# Patient Record
Sex: FEMALE | Race: ASIAN | NOT HISPANIC OR LATINO | Employment: FULL TIME | URBAN - METROPOLITAN AREA
[De-identification: names, ages, dates, MRNs, and addresses within clinical notes are randomized per-mention and may not be internally consistent; named-entity substitution may affect disease eponyms.]

---

## 2017-01-20 ENCOUNTER — OFFICE VISIT (OUTPATIENT)
Dept: FAMILY MEDICINE | Facility: CLINIC | Age: 27
End: 2017-01-20

## 2017-01-20 VITALS
HEART RATE: 68 BPM | TEMPERATURE: 98 F | WEIGHT: 125 LBS | OXYGEN SATURATION: 98 % | SYSTOLIC BLOOD PRESSURE: 101 MMHG | BODY MASS INDEX: 25.2 KG/M2 | RESPIRATION RATE: 16 BRPM | HEIGHT: 59 IN | DIASTOLIC BLOOD PRESSURE: 67 MMHG

## 2017-01-20 DIAGNOSIS — H72.92 RUPTURED TYMPANIC MEMBRANE, LEFT: ICD-10-CM

## 2017-01-20 DIAGNOSIS — K05.10 GINGIVITIS: ICD-10-CM

## 2017-01-20 DIAGNOSIS — R07.0 THROAT PAIN: Primary | ICD-10-CM

## 2017-01-20 DIAGNOSIS — Z23 IMMUNIZATION DUE: ICD-10-CM

## 2017-01-20 LAB — S PYO AG THROAT QL IA.RAPID: NEGATIVE

## 2017-01-20 NOTE — PROGRESS NOTES
"       HPI:     Sydney Dao is a 26 year old  female with no significant PMH who presents with sore throat pain.     For the last 3 weeks she has had pain in her lower gums and in her throat. No fever, +runny nose.     Is able to eat and drink normally. No nausea or vomiting. No dysphagia or odynophagia. No problems with chewing.     Has taken tylenol and that helps a little bit.     No history of problems with teeth. Does not have a dentist. Has not been to a dentist in years.     A [employer]  was used for this visit           PMHX:     Patient Active Problem List   Diagnosis     IUD contraception: Mirena       Current Outpatient Prescriptions   Medication Sig Dispense Refill     levonorgestrel (MIRENA) 20 MCG/24HR IUD 1 each by Intrauterine route once       acetaminophen (TYLENOL) 325 MG tablet Take 2 tablets (650 mg) by mouth every 6 hours as needed for mild pain 100 tablet 0     ibuprofen (ADVIL,MOTRIN) 600 MG tablet Take 1 tablet (600 mg) by mouth every 6 hours as needed for moderate pain 30 tablet 1       Social History   Substance Use Topics     Smoking status: Never Smoker      Smokeless tobacco: Never Used     Alcohol Use: No       Social History     Social History Narrative       Allergies   Allergen Reactions     No Known Drug Allergy        No results found for this or any previous visit (from the past 24 hour(s)).         Review of Systems:   See HPI.          Physical Exam:     Filed Vitals:    01/20/17 0852   BP: 101/67   Pulse: 68   Temp: 98  F (36.7  C)   TempSrc: Oral   Resp: 16   Height: 4' 11\" (149.9 cm)   Weight: 125 lb (56.7 kg)   SpO2: 98%     Body mass index is 25.23 kg/(m^2).    General: Alert, well-appearing female in NAD  HEENT: PERRL, no conjunctival injection. moist oral mucus membranes, teeth in poor dentition with many tartar plaques present. Patient has pain with movement of right buccal mucosa away from right lower gums. She has pain at the gums surrounding teeth 31 and 32. " There are no ulcerations in the oral mucosa. There is mild pharyngeal erythema, no exudate. External auditory canals are normal bilaterally. Left TM normal. Right TM shows 30% central perforation.   Pulm: CTA BL, no tachypnea  CV: RRR, no murmur  Abd: soft, NTND, no masses  Ext: Warm, well perfused, 2+ BL radial pulses, no LE edema  Skin: No rash on limited skin exam      Assessment and Plan     1. Throat pain  Patient did have mild pharyngeal erythema but rapid strep negative.Will call her if culture comes back positive.   - Rapid Strep Screen (Group) ([facility])  - [facility] Throat ([facility])    2. Gingivitis  Patient has pain along the gums and at the right buccal mucosa. No sign of edema of the cheek. Will start by treating for gingivitis but discussed that it is very important for patient to see a dentist to debride teeth.   - amoxicillin-clavulanate (AUGMENTIN) 875-125 MG per tablet; Take 1 tablet by mouth 2 times daily  Dispense: 20 tablet; Refill: 0  - DENTAL REFERRAL; Future    3. Ruptured tympanic membrane, left  Discussed with patient the risk of hearing loss with ruptured TM. She has low concern about her ear. She is concerned about cost. I did advise her that this issue can wait until after she is done dealing with her oral concerns.   - OTOLARYNGOLOGY REFERRAL; Future    4. Immunization due  - ADMIN VACCINE, INITIAL  - Flu vaccine, quad, with preservative, 0.5 ml    Options for treatment and follow-up care were reviewed with the patient and/or guardian. Sydney Dao and/or guardian engaged in the decision making process and verbalized understanding of the options discussed and agreed with the final plan.    Claudia Sheppard MD  [facility] Resident  Pager# [phone]    Precepted with:Madison Foster MD

## 2017-01-20 NOTE — MR AVS SNAPSHOT
After Visit Summary   1/20/2017    Sydney Dao    MRN: [id number]           Patient Information     Date Of Birth          1990        Visit Information        Provider Department      1/20/2017 8:40 AM Claudia Sheppard MD [facility]        Today's Diagnoses     Throat pain    -  1     Gingivitis         Ruptured tympanic membrane, left            Follow-ups after your visit        Additional Services     DENTAL REFERRAL       Patient is in room number: 16    Reason for Referral: Suspect gingivitis       needed: Yes  Language: Hmong    May leave message on voicemail: Yes    ([facility] Only) Referral should be tracked (Yes/No)?            OTOLARYNGOLOGY REFERRAL       Patient is in room number: 16    Reason for Referral: Perforated left tympanic membrane     needed: Yes  Language: Hmong    May leave message on voicemail: Yes    ([facility] Only) Referral should be tracked (Yes/No)?                  Future tests that were ordered for you today     Open Future Orders        Priority Expected Expires Ordered    OTOLARYNGOLOGY REFERRAL Routine  1/20/2018 1/20/2017    DENTAL REFERRAL Routine  1/20/2018 1/20/2017            Who to contact     Please call your clinic at [phone] to:    Ask questions about your health    Make or cancel appointments    Discuss your medicines    Learn about your test results    Speak to your doctor   If you have compliments or concerns about an experience at your clinic, or if you wish to file a complaint, please contact [facility] Patient Relations at [phone] or email us at [email or web address]         Additional Information About Your Visit        MyChart Information     Winkcam is an electronic gateway that provides easy, online access to your medical records. With Winkcam, you can request a clinic appointment, read your test results, renew a prescription or communicate with your care team.     To  "sign up for MyChart visit the website at [email or web address]   You will be asked to enter the access code listed below, as well as some personal information. Please follow the directions to create your username and password.     Your access code is: [email or web address]  Expires: 2017  8:39 AM     Your access code will  in 90 days. If you need help or a new code, please contact your [facility] or call [phone] for assistance.        Care EveryWhere ID     This is your Care EveryWhere ID. This could be used by other organizations to access your [place] medical records  [id number]        Your Vitals Were     Pulse Temperature Respirations Height BMI (Body Mass Index) Pulse Oximetry    68 98  F (36.7  C) (Oral) 16 4' 11\" (149.9 cm) 25.23 kg/m2 98%       Blood Pressure from Last 3 Encounters:   17 101/67   16 111/66   11/09/15 100/68    Weight from Last 3 Encounters:   17 125 lb (56.7 kg)   16 121 lb 12.8 oz (55.248 kg)   11/09/15 117 lb (53.071 kg)              We Performed the Following     Rapid Strep Screen (Group) ([facility])          Today's Medication Changes          These changes are accurate as of: 17  9:29 AM.  If you have any questions, ask your nurse or doctor.               Start taking these medicines.        Dose/Directions    amoxicillin-clavulanate 875-125 MG per tablet   Commonly known as:  AUGMENTIN   Used for:  Gingivitis   Started by:  Claudia Sheppard MD        Dose:  1 tablet   Take 1 tablet by mouth 2 times daily   Quantity:  20 tablet   Refills:  0            Where to get your medicines      These medications were sent to [facility]/pharmacy #[id number] - [place], [place] - [address]  [address], [place] [address]    Hours:  24-hours Phone:  [phone]    - amoxicillin-clavulanate 875-125 MG per tablet             Primary Care Provider Office Phone # Fax #    Mya Burton [phone]    "    [facility] [address] [place] [address]        Thank you!     Thank you for choosing [facility]  for your care. Our goal is always to provide you with excellent care. Hearing back from our patients is one way we can continue to improve our services. Please take a few minutes to complete the written survey that you may receive in the mail after your visit with us. Thank you!             Your Updated Medication List - Protect others around you: Learn how to safely use, store and throw away your medicines at www.disposemymeds.org.          This list is accurate as of: 1/20/17  9:29 AM.  Always use your most recent med list.                   Brand Name Dispense Instructions for use    acetaminophen 325 MG tablet    TYLENOL    100 tablet    Take 2 tablets (650 mg) by mouth every 6 hours as needed for mild pain       amoxicillin-clavulanate 875-125 MG per tablet    AUGMENTIN    20 tablet    Take 1 tablet by mouth 2 times daily       ibuprofen 600 MG tablet    ADVIL/MOTRIN    30 tablet    Take 1 tablet (600 mg) by mouth every 6 hours as needed for moderate pain       levonorgestrel 20 MCG/24HR IUD    MIRENA     1 each by Intrauterine route once

## 2017-01-20 NOTE — NURSING NOTE
1/19/2017 PCS Previsit Plan   DUE FOR:  Pap-offered  Flu shot-offered  MyChart? pending  No reminder call since just schedule appt this am  CYRIL Washington    January 20, 2017 MD Previsit Plan  Agree with above. Thank you!  --Claudia Sheppard     name: Yesi Yuan  Language: [facility]  Agency: [employer]  Phone number: [phone]    Sydney Padilla Feliberto      1.  Has the patient received the information for the influenza vaccine? YES    2.  Does the patient have any of the following contraindications? No     Allergy to eggs? No     Allergic reaction to previous influenza vaccines? No     Any other problems to previous influenza vaccines? No     Paralyzed by Guillain-[place] syndrome? No     Currently pregnant? No      Current moderate or severe illness? No    Vaccination given by CYRIL Washington.  Recorded by Jared Yuan

## 2017-01-20 NOTE — PATIENT INSTRUCTIONS
Patient has a referral for both an ENT and dental.  She wanted to make her own appointments and card was given for [facility] and [facility].       Kalina

## 2017-01-22 LAB — CULTURE: NORMAL

## 2017-01-23 NOTE — PROGRESS NOTES
Quick Note:    Please call:    Jonn Grimes,  Your throat culture results came back as normal, there is no strep bacteria there so no need for antibiotics.    Thank you,  Dr Burton  ______

## 2017-01-27 NOTE — PROGRESS NOTES
Preceptor Attestation:  Patient's case reviewed and discussed with Claudia Sheppard MD Patient seen and discussed with the resident.. I agree with assessment and plan of care.  Supervising Physician:  Madison Foster MD  [facility]

## 2017-06-26 ENCOUNTER — OFFICE VISIT (OUTPATIENT)
Dept: FAMILY MEDICINE | Facility: CLINIC | Age: 27
End: 2017-06-26

## 2017-06-26 VITALS
BODY MASS INDEX: 24.9 KG/M2 | DIASTOLIC BLOOD PRESSURE: 66 MMHG | HEIGHT: 60 IN | HEART RATE: 68 BPM | SYSTOLIC BLOOD PRESSURE: 101 MMHG | OXYGEN SATURATION: 100 % | TEMPERATURE: 97.6 F | WEIGHT: 126.8 LBS

## 2017-06-26 DIAGNOSIS — Z30.431 IUD CHECK UP: Primary | ICD-10-CM

## 2017-06-26 DIAGNOSIS — Z97.5 IUD CONTRACEPTION: ICD-10-CM

## 2017-06-26 DIAGNOSIS — Z12.4 SCREENING FOR CERVICAL CANCER: ICD-10-CM

## 2017-06-26 DIAGNOSIS — Z00.00 HEALTHCARE MAINTENANCE: ICD-10-CM

## 2017-06-26 NOTE — PROGRESS NOTES
Preceptor Attestation:  Patient's case reviewed and discussed with Aracelis العراقي, DO Patient seen and discussed with the resident.. I agree with assessment and plan of care.  Supervising Physician:  Thor Whitten MD  [facility]

## 2017-06-26 NOTE — MR AVS SNAPSHOT
After Visit Summary   2017    Sydney Dao    MRN: [id number]           Patient Information     Date Of Birth          1990        Visit Information        Provider Department      2017 9:20 AM Aracelis العراقي, DO [facility]        Today's Diagnoses     IUD check up    -  1    IUD contraception: Mirena        Healthcare maintenance        Screening for cervical cancer           Follow-ups after your visit        Who to contact     Please call your clinic at [phone] to:    Ask questions about your health    Make or cancel appointments    Discuss your medicines    Learn about your test results    Speak to your doctor   If you have compliments or concerns about an experience at your clinic, or if you wish to file a complaint, please contact [facility] Patient Relations at [phone] or email us at [email or web address]         Additional Information About Your Visit        MyChart Information     Adonit is an electronic gateway that provides easy, online access to your medical records. With Adonit, you can request a clinic appointment, read your test results, renew a prescription or communicate with your care team.     To sign up for GetBulbt visit the website at [email or web address]   You will be asked to enter the access code listed below, as well as some personal information. Please follow the directions to create your username and password.     Your access code is: [email or web address]  Expires: 2017 10:31 AM     Your access code will  in 90 days. If you need help or a new code, please contact your [facility] or call [phone] for assistance.        Care EveryWhere ID     This is your Care EveryWhere ID. This could be used by other organizations to access your [place] medical records  [id number]        Your Vitals Were     Pulse Temperature Height Pulse Oximetry BMI (Body Mass Index)        68 97.6  F (36.4  C) (Oral) 5' (152.4 cm) 100% 24.76 kg/m2        Blood Pressure from Last 3 Encounters:   06/26/17 101/66   01/20/17 101/67   02/17/16 111/66    Weight from Last 3 Encounters:   06/26/17 126 lb 12.8 oz (57.5 kg)   01/20/17 125 lb (56.7 kg)   02/17/16 121 lb 12.8 oz (55.2 kg)              We Performed the Following     GYN Cytology ([facility])        Primary Care Provider Office Phone # Fax #    Mya Britt Burton [phone]       [facility] [address] [place] [address]        Equal Access to Services     ABIMAEL AMBRIZ : Hadii dorene hernadez hadasho Soomaali, waaxda luqadaha, qaybta kaalmada adeegyada, waxay idiin paulinan bruno العراقي . So [facility] [phone].    ATENCIÓN: Si habla español, tiene a mcclelland disposición servicios gratuitos de asistencia lingüística. Llame al [phone].    We comply with applicable federal civil rights laws and [place] laws. We do not discriminate on the basis of race, color, national origin, age, disability sex, sexual orientation or gender identity.            Thank you!     Thank you for choosing [facility]  for your care. Our goal is always to provide you with excellent care. Hearing back from our patients is one way we can continue to improve our services. Please take a few minutes to complete the written survey that you may receive in the mail after your visit with us. Thank you!             Your Updated Medication List - Protect others around you: Learn how to safely use, store and throw away your medicines at www.disposemymeds.org.          This list is accurate as of: 6/26/17 10:31 AM.  Always use your most recent med list.                   Brand Name Dispense Instructions for use Diagnosis    acetaminophen 325 MG tablet    TYLENOL    100 tablet    Take 2 tablets (650 mg) by mouth every 6 hours as needed for mild pain    Back pain       ibuprofen 600 MG tablet    ADVIL/MOTRIN    30 tablet    Take 1 tablet  (600 mg) by mouth every 6 hours as needed for moderate pain    Back pain       levonorgestrel 20 MCG/24HR IUD    MIRENA     1 each by Intrauterine route once

## 2017-06-26 NOTE — NURSING NOTE
name: Alejandra Ortiz  Language: Hmong  Agency: [facility]  Phone number: [phone]

## 2017-06-26 NOTE — PROGRESS NOTES
HPI:       Sydney Dao is a 27 year old  female without a significant past medical history who presents for IUD check.     IUD -   - Mirena placed May 2014, due for removal May 2019.   - Amenorrhea, no periods on MIrena  - No complications or concerns    HEALTH MAINTENANCE -   - Due for pap smear, last pap 2013, no abnormal paps   - Sexually active, monogamous relationship, not concerned about STI's     A [employer]  was used for this visit         PMHX:     Patient Active Problem List   Diagnosis     IUD contraception: Mirena       Current Outpatient Prescriptions   Medication     levonorgestrel (MIRENA) 20 MCG/24HR IUD     ibuprofen (ADVIL,MOTRIN) 600 MG tablet     acetaminophen (TYLENOL) 325 MG tablet      Allergies   Allergen Reactions     No Known Drug Allergy      Social History     Social History     Marital status:      Spouse name: Ariana White     Number of children: 1     Years of education: 8th [place]     Occupational History     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     Social History Main Topics     Smoking status: Never Smoker     Smokeless tobacco: Never Used     Alcohol use No     Drug use: No     Sexual activity: Yes     Partners: Male     Other Topics Concern     None     Social History Narrative            Review of Systems:   CONSTITUTIONAL: normal appetite, stable weight, no fatigue  GYN: no changes in vaginal discharge  URO: no dysuria or frequency           Physical Exam:     /66  Pulse 68  Temp 97.6  F (36.4  C) (Oral)  Ht 5' (152.4 cm)  Wt 126 lb 12.8 oz (57.5 kg)  SpO2 100%  BMI 24.76 kg/m2    GENERAL: pleasant, well groomed, young Hmong female, no acute distress  HEART: regular rate and rhythm, no murmurs  LUNGS: clear to auscultation bilaterally   GYN: no vulvar lesions, physiologic discharge, cervix pink without lesions, IUD strings visible, approximately 3 cm in length   VASCULAR: pulses intact, brisk capillary refill       Assessment and Plan     1. IUD check up  2.  IUD contraception: Mirena  Strings still in place.   Due for removal May 2019, added to problem list overview.   Counseled okay to remove early if desires alternative method or pregnancy.     3. Healthcare maintenance  Declines gonorrhea/chlamydia screening     4. Screening for cervical cancer  Due for pap smear today. < 31 yo, so not cotesting with HPV, but will reflex test if abnormal. Will call with results.   - GYN Cytology ([facility])      Options for treatment and follow-up care were reviewed with the patient and/or guardian. Sydney Valerie Dao and/or guardian engaged in the decision making process and verbalized understanding of the options discussed and agreed with the final plan.    Aracelis العراقي DO      Precepted today with: Thor Whitten MD

## 2017-07-04 ENCOUNTER — RECORDS - HEALTHEAST (OUTPATIENT)
Dept: ADMINISTRATIVE | Facility: OTHER | Age: 27
End: 2017-07-04

## 2017-07-04 LAB
HIGH RISK?: NO
HPV REFLEX?: NORMAL
LAB AP ABNORMAL BLEEDING: NO
LAB AP BIRTH CONTROL/HORMONES: NORMAL
LAB AP CASE REPORT: NORMAL
LAB AP CERVICAL APPEARANCE: NORMAL
LAB AP GYN INTERPRETATION: NORMAL
LAB AP MALIGNANT?: NORMAL
LAB AP PATIENT STATUS: NORMAL
LAB AP PREVIOUS ABNL: NO
LAB AP PREVIOUS NORMAL: NORMAL
LAST MENS PERIOD: NORMAL
SPECIMEN ADEQUACY:: NORMAL

## 2017-10-02 ENCOUNTER — OFFICE VISIT (OUTPATIENT)
Dept: FAMILY MEDICINE | Facility: CLINIC | Age: 27
End: 2017-10-02

## 2017-10-02 VITALS
HEIGHT: 60 IN | BODY MASS INDEX: 23.91 KG/M2 | OXYGEN SATURATION: 100 % | SYSTOLIC BLOOD PRESSURE: 103 MMHG | WEIGHT: 121.8 LBS | TEMPERATURE: 98.4 F | DIASTOLIC BLOOD PRESSURE: 69 MMHG | HEART RATE: 68 BPM

## 2017-10-02 DIAGNOSIS — Z30.432 ENCOUNTER FOR IUD REMOVAL: Primary | ICD-10-CM

## 2017-10-02 DIAGNOSIS — Z31.69 ENCOUNTER FOR PRECONCEPTION CONSULTATION: ICD-10-CM

## 2017-10-02 DIAGNOSIS — Z23 NEEDS FLU SHOT: ICD-10-CM

## 2017-10-02 RX ORDER — PRENATAL VIT/IRON FUM/FOLIC AC 27MG-0.8MG
1 TABLET ORAL DAILY
Qty: 100 TABLET | Refills: 3 | Status: SHIPPED | OUTPATIENT
Start: 2017-10-02 | End: 2018-06-06

## 2017-10-02 NOTE — PATIENT INSTRUCTIONS
- you may have bleeding or cramping in the next week. If it is much heavier than a period, please call the clinic.

## 2017-10-02 NOTE — PROGRESS NOTES
"  Subjective:   Sydney Dao is a 27 year old  female with Mirena IUD who presents for:    IUD removal; placed 5/2014, strings checked 6/2017  -  wants to have another baby  - rare cramping  - no periods  - never gc/chlam  - , same partner  - 2 children, ages 4 and 3  - not on prenatal vitamin    Social/other: , 2 kids  A [employer]  was used for this visit  ROS negative other than mentioned in HPI   History and medications listed below  Objective:   /69  Pulse 68  Temp 98.4  F (36.9  C) (Oral)  Ht 4' 11.5\" (151.1 cm)  Wt 121 lb 12.8 oz (55.2 kg)  SpO2 100%  BMI 24.19 kg/m2  Body mass index is 24.19 kg/(m^2).  Gen: alert, NAD,  HENT: oral mucosa moist  Abd: soft, nontender  MS: extremities grossly normal  Ext: no LE edema  Skin: no rashes on exposed skin  Neuro: mentation intact, speech normal  Psych: mood normal, affect normal    Procedure note:   IUD removal  Patient placed in lithotomy position, cervix visualized with speculum. No abnormal discharge or pain. Cervix normal appearing with no friability or lesions. Both IUD strings were visible about 1 cm from external os. Strings were grasped with ring forceps and IUD easily removed with gentle traction. No bleeding noted. No cramping. Patient instructed to lay flat for several minutes afterwards. No lightheadedness or syncope with position changes. Dr. Swanson present for entire procedure.    Assessment and Plan     1. Encounter for IUD removal  Tolerated removal well. Amenorrheic on IUD. Plans to try to conceive.    2. Encounter for preconception consultation  Counseled on taking PNV and ability to become pregnant prior to menstruation, and that it could take 1 year.  - Prenatal Vit-Fe Fumarate-FA (PRENATAL MULTIVITAMIN PLUS IRON) 27-0.8 MG TABS per tablet; Take 1 tablet by mouth daily  Dispense: 100 tablet; Refill: 3    3. Needs flu shot  - ADMIN VACCINE, INITIAL  - Flu vaccine, quad, preserve-free, 0.5 ml    Follow up: " alexandre Peter MD, MPH  [facility] Medicine Resident    Precepted with: Su Swanson MD    Options for treatment and follow-up care were reviewed with the patient and/or guardian. Sydney Dao and/or guardian engaged in the decision making process and verbalized understanding of the options discussed and agreed with the final plan.  PMHX:     Patient Active Problem List   Diagnosis     IUD contraception: Mirena     Screening for cervical cancer     Current Outpatient Prescriptions   Medication Sig Dispense Refill     Prenatal Vit-Fe Fumarate-FA (PRENATAL MULTIVITAMIN PLUS IRON) 27-0.8 MG TABS per tablet Take 1 tablet by mouth daily 100 tablet 3     levonorgestrel (MIRENA) 20 MCG/24HR IUD 1 each by Intrauterine route once       ibuprofen (ADVIL,MOTRIN) 600 MG tablet Take 1 tablet (600 mg) by mouth every 6 hours as needed for moderate pain 30 tablet 1     acetaminophen (TYLENOL) 325 MG tablet Take 2 tablets (650 mg) by mouth every 6 hours as needed for mild pain 100 tablet 0     Family History   Problem Relation Age of Onset     Family History Negative Other      Social History     Social History Narrative     Allergies   Allergen Reactions     No Known Drug Allergy      No results found for this or any previous visit (from the past 24 hour(s)).

## 2017-10-02 NOTE — NURSING NOTE
Sydney Dao      1.  Has the patient received the information for the influenza vaccine? YES    2.  Does the patient have any of the following contraindications?     Allergy to eggs? No     Allergic reaction to previous influenza vaccines? No     Any other problems to previous influenza vaccines? No     Paralyzed by Guillain-[place] syndrome? No     Currently pregnant? NO     Current moderate or severe illness? No    Vaccination given by TUSHAR MCCORD CMA  .  Recorded by Jenae Mccord

## 2017-10-02 NOTE — MR AVS SNAPSHOT
After Visit Summary   10/2/2017    Sydney Dao    MRN: [id number]           Patient Information     Date Of Birth          1990        Visit Information        Provider Department      10/2/2017 10:20 AM Tamera Peter MD [facility]        Today's Diagnoses     Encounter for IUD removal    -  1    Encounter for preconception consultation          Care Instructions    - you may have bleeding or cramping in the next week. If it is much heavier than a period, please call the clinic.           Follow-ups after your visit        Follow-up notes from your care team     Return if symptoms worsen or fail to improve.      Who to contact     Please call your clinic at [phone] to:    Ask questions about your health    Make or cancel appointments    Discuss your medicines    Learn about your test results    Speak to your doctor   If you have compliments or concerns about an experience at your clinic, or if you wish to file a complaint, please contact [facility] Patient Relations at [phone] or email us at [email or web address]         Additional Information About Your Visit        MyChart Information     [employer] is an electronic gateway that provides easy, online access to your medical records. With [employer], you can request a clinic appointment, read your test results, renew a prescription or communicate with your care team.     To sign up for [employer] visit the website at [email or web address]   You will be asked to enter the access code listed below, as well as some personal information. Please follow the directions to create your username and password.     Your access code is: [email or web address]  Expires: 2017 11:51 AM     Your access code will  in 90 days. If you need help or a new code, please contact your [facility] or call [phone] for assistance.        Care EveryWhere ID     This is your Care  "EveryWhere ID. This could be used by other organizations to access your [facility] medical records  [id number]        Your Vitals Were     Pulse Temperature Height Pulse Oximetry BMI (Body Mass Index)       68 98.4  F (36.9  C) (Oral) 4' 11.5\" (151.1 cm) 100% 24.19 kg/m2        Blood Pressure from Last 3 Encounters:   10/02/17 103/69   06/26/17 101/66   01/20/17 101/67    Weight from Last 3 Encounters:   10/02/17 121 lb 12.8 oz (55.2 kg)   06/26/17 126 lb 12.8 oz (57.5 kg)   01/20/17 125 lb (56.7 kg)              Today, you had the following     No orders found for display         Today's Medication Changes          These changes are accurate as of: 10/2/17 11:51 AM.  If you have any questions, ask your nurse or doctor.               Start taking these medicines.        Dose/Directions    prenatal multivitamin plus iron 27-0.8 MG Tabs per tablet   Used for:  Encounter for preconception consultation   Started by:  Tamera Peter MD        Dose:  1 tablet   Take 1 tablet by mouth daily   Quantity:  100 tablet   Refills:  3            Where to get your medicines      These medications were sent to [facility]/pharmacy #[id number] - [place], [place] - [address] [facility]  [address], [place] [address]    Hours:  24-hours Phone:  [phone]     prenatal multivitamin plus iron 27-0.8 MG Tabs per tablet                Primary Care Provider Office Phone # Fax #    Lashawn Ojeda [phone]       [facility] [address] [place] [address]        Equal Access to Services     ABIMAEL AMBRIZ AH: Hadarchana Brown, waaxda luqadaha, qaybta kaalmasofia tellez. So [facility] [phone].    ATENCIÓN: Si habla español, tiene a mcclelland disposición servicios gratuitos de asistencia lingüística. Charanjit al [phone].    We comply with applicable federal civil rights laws and [place] laws. We do not discriminate on the basis of race, color, " national origin, age, disability, sex, sexual orientation, or gender identity.            Thank you!     Thank you for choosing [facility]  for your care. Our goal is always to provide you with excellent care. Hearing back from our patients is one way we can continue to improve our services. Please take a few minutes to complete the written survey that you may receive in the mail after your visit with us. Thank you!             Your Updated Medication List - Protect others around you: Learn how to safely use, store and throw away your medicines at www.disposemymeds.org.          This list is accurate as of: 10/2/17 11:51 AM.  Always use your most recent med list.                   Brand Name Dispense Instructions for use Diagnosis    acetaminophen 325 MG tablet    TYLENOL    100 tablet    Take 2 tablets (650 mg) by mouth every 6 hours as needed for mild pain    Back pain       ibuprofen 600 MG tablet    ADVIL/MOTRIN    30 tablet    Take 1 tablet (600 mg) by mouth every 6 hours as needed for moderate pain    Back pain       levonorgestrel 20 MCG/24HR IUD    MIRENA     1 each by Intrauterine route once        prenatal multivitamin plus iron 27-0.8 MG Tabs per tablet     100 tablet    Take 1 tablet by mouth daily    Encounter for preconception consultation

## 2017-10-05 NOTE — PROGRESS NOTES
Preceptor Attestation:  Patient's case reviewed and discussed with Tamera Peter MD.  Patient seen and discussed with the resident and I was present for and supervised the entire procedure.  I agree with assessment and plan of care.  Supervising Physician:  Su Swanson MD  [facility]

## 2018-05-15 ENCOUNTER — OFFICE VISIT (OUTPATIENT)
Dept: FAMILY MEDICINE | Facility: CLINIC | Age: 28
End: 2018-05-15
Payer: COMMERCIAL

## 2018-05-15 ENCOUNTER — RECORDS - HEALTHEAST (OUTPATIENT)
Dept: ADMINISTRATIVE | Facility: OTHER | Age: 28
End: 2018-05-15

## 2018-05-15 ENCOUNTER — HOSPITAL ENCOUNTER (OUTPATIENT)
Dept: ULTRASOUND IMAGING | Facility: HOSPITAL | Age: 28
Discharge: HOME | End: 2018-05-15
Attending: INTERPRETER

## 2018-05-15 VITALS
HEIGHT: 59 IN | SYSTOLIC BLOOD PRESSURE: 96 MMHG | DIASTOLIC BLOOD PRESSURE: 61 MMHG | WEIGHT: 126 LBS | BODY MASS INDEX: 25.4 KG/M2 | OXYGEN SATURATION: 100 % | RESPIRATION RATE: 16 BRPM | TEMPERATURE: 98 F | HEART RATE: 60 BPM

## 2018-05-15 DIAGNOSIS — B37.31 CANDIDIASIS OF VAGINA: ICD-10-CM

## 2018-05-15 DIAGNOSIS — R10.2 SUPRAPUBIC PAIN: ICD-10-CM

## 2018-05-15 DIAGNOSIS — R10.2 SUPRAPUBIC PAIN: Primary | ICD-10-CM

## 2018-05-15 LAB
BACTERIA: NORMAL
BACTERIA: NORMAL
BILIRUBIN UR: NEGATIVE
BLOOD UR: ABNORMAL
CASTS: NEGATIVE /LPF
CLARITY, URINE: CLEAR
CLUE CELLS: NORMAL
COLOR UR: YELLOW
CRYSTAL URINE: NEGATIVE /LPF
EPITHELIAL CELLS UR: NORMAL /LPF (ref 0–2)
GLUCOSE URINE: NEGATIVE
HCG UR QL: POSITIVE
KETONES UR QL: NEGATIVE
LEUKOCYTE ESTERASE UR: ABNORMAL
MOTILE TRICHOMONAS: NEGATIVE
MUCOUS URINE: NORMAL LPF
NITRITE UR QL STRIP: NEGATIVE
ODOR: NORMAL
PH UR STRIP: 5.5 [PH] (ref 5–7)
PH WET PREP: NORMAL
PROTEIN UR: ABNORMAL
RBC URINE: NORMAL /HPF
SP GR UR STRIP: 1.02
UROBILINOGEN UR STRIP-ACNC: ABNORMAL
WBC URINE: NORMAL /HPF
WBC WET PREP: NORMAL
YEAST: PRESENT

## 2018-05-15 NOTE — PATIENT INSTRUCTIONS
Referral for ( TEST )  :      US Pelvic Complete with Transvaginal  LOCATION/PLACE/Provider :    [facility]  DATE & TIME :     5- at 1:00  PHONE :     [phone]  FAX :     [phone]  ADDITIONAL INFORMATION :     Drink 32oz of water one hour before appt and do not void  Appointment made by clinic staff/:   Bonita

## 2018-05-15 NOTE — MR AVS SNAPSHOT
After Visit Summary   5/15/2018    Sydney Dao    MRN: [id number]           Patient Information     Date Of Birth          1990        Visit Information        Provider Department      5/15/2018 9:20 AM Lary Antunez MD [facility]        Today's Diagnoses     Suprapubic pain    -  1    Candidiasis of vagina          Care Instructions    Referral for ( TEST )  :      US Pelvic Complete with Transvaginal  LOCATION/PLACE/Provider :    [facility]  DATE & TIME :     5- at 1:00  PHONE :     [phone]  FAX :     [phone]  ADDITIONAL INFORMATION :     Drink 32oz of water one hour before appt and do not void  Appointment made by clinic staff/:   Bonita            Follow-ups after your visit        Follow-up notes from your care team     Return if symptoms worsen or fail to improve.      Your next 10 appointments already scheduled     2018  8:40 AM CDT   NEW OB with Tamera Peter MD   [facility] ([facility])    [address] [place] [address]   [phone]              Who to contact     Please call your clinic at [phone] to:    Ask questions about your health    Make or cancel appointments    Discuss your medicines    Learn about your test results    Speak to your doctor            Additional Information About Your Visit        MyChart Information     [employer] is an electronic gateway that provides easy, online access to your medical records. With [employer], you can request a clinic appointment, read your test results, renew a prescription or communicate with your care team.     To sign up for [employer] visit the website at [email or web address]   You will be asked to enter the access code listed below, as well as some personal information. Please follow the directions to create your username and password.     Your access code is: [email or web address]  Expires: 8/15/2018  7:19 PM     Your access code will  in 90 days.  "If you need help or a new code, please contact your [facility] or call [phone] for assistance.        Care EveryWhere ID     This is your Care EveryWhere ID. This could be used by other organizations to access your [facility] medical records  [id number]        Your Vitals Were     Pulse Temperature Respirations Height Pulse Oximetry BMI (Body Mass Index)    60 98  F (36.7  C) (Oral) 16 4' 11\" (149.9 cm) 100% 25.45 kg/m2       Blood Pressure from Last 3 Encounters:   05/15/18 96/61   10/02/17 103/69   06/26/17 101/66    Weight from Last 3 Encounters:   05/15/18 126 lb (57.2 kg)   10/02/17 121 lb 12.8 oz (55.2 kg)   06/26/17 126 lb 12.8 oz (57.5 kg)              We Performed the Following     HCG Qualitative Urine (UPT)  ([facility])     Urinalysis, Micro If ([facility])     Urine Microscopic ([facility])     US OB < 14 Weeks Single     Wet Prep ([facility])          Today's Medication Changes          These changes are accurate as of 5/15/18 11:59 PM.  If you have any questions, ask your nurse or doctor.               Start taking these medicines.        Dose/Directions    miconazole 200 & 2 MG-% (9GM) Kit   Commonly known as:  EQ MICONAZOLE 3 COMBO PACK   Used for:  Candidiasis of vagina   Started by:  Lary Antunez MD        Dose:  1 each   Place 1 each vaginally once for 1 dose   Quantity:  1 each   Refills:  0         Stop taking these medicines if you haven't already. Please contact your care team if you have questions.     levonorgestrel 20 MCG/24HR IUD   Commonly known as:  MIRENA   Stopped by:  Lary Antunez MD                Where to get your medicines      These medications were sent to [facility]/pharmacy #[id number] - [place], [place] - [address] [facility] [place] [address]     Phone:  [phone]     miconazole 200 & 2 MG-% (9GM) Kit                Primary Care Provider Office Phone # Fax #    Lashawn Ojeda [phone]       [facility] " [address] [place] [address]        Equal Access to Services     EVONKARENA KATINA : Hadii dorene hernadez whitneyviola Kristanali, watavaresda chinrosiha, qageorgieta shunquincymirna carrshonamirna, sofia fitzgerald paulinajose pattersonadelalindy crabtree. So [facility] [phone].    ATENCIÓN: Si habla español, tiene a mcclelland disposición servicios gratuitos de asistencia lingüística. Llame al [phone].    We comply with applicable federal civil rights laws and [place] laws. We do not discriminate on the basis of race, color, national origin, age, disability, sex, sexual orientation, or gender identity.            Thank you!     Thank you for choosing [facility]  for your care. Our goal is always to provide you with excellent care. Hearing back from our patients is one way we can continue to improve our services. Please take a few minutes to complete the written survey that you may receive in the mail after your visit with us. Thank you!             Your Updated Medication List - Protect others around you: Learn how to safely use, store and throw away your medicines at www.disposemymeds.org.          This list is accurate as of 5/15/18 11:59 PM.  Always use your most recent med list.                   Brand Name Dispense Instructions for use Diagnosis    acetaminophen 325 MG tablet    TYLENOL    100 tablet    Take 2 tablets (650 mg) by mouth every 6 hours as needed for mild pain    Back pain       ibuprofen 600 MG tablet    ADVIL/MOTRIN    30 tablet    Take 1 tablet (600 mg) by mouth every 6 hours as needed for moderate pain    Back pain       miconazole 200 & 2 MG-% (9GM) Kit    EQ MICONAZOLE 3 COMBO PACK    1 each    Place 1 each vaginally once for 1 dose    Candidiasis of vagina       prenatal multivitamin plus iron 27-0.8 MG Tabs per tablet     100 tablet    Take 1 tablet by mouth daily    Encounter for preconception consultation

## 2018-05-15 NOTE — PROGRESS NOTES
"Chief Complaint   Patient presents with     Abdominal Pain     lower abdominal pain since yesterday. tightness.       S:  Sydney Dao is a 28 year old year old female who presents to discuss:    1. Stomach pain  - yesterday was carrying a heavy box and after that started feeling a tightness in the lower abdomen, tight, stiff bloated   -very painful  -no nausea/vomiting, diarrhea, constipation, no vaginal bleeding, itching discharge or burning  -no hx of abdominal surgeries  -a little bit better today    No period for 2 months, wonders if she might be pregnant  Home test was negative  IUD has been removed and is trying to get pregnant    Complete ROS otherwise negative.     No past medical history on file.  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     Social History Main Topics     Smoking status: Never Smoker     Smokeless tobacco: Never Used     Alcohol use No     Drug use: No     Sexual activity: Yes     Partners: Male     Other Topics Concern     Not on file     Social History Narrative       O:  Vitals: BP 96/61  Pulse 60  Temp 98  F (36.7  C) (Oral)  Resp 16  Ht 4' 11\" (149.9 cm)  Wt 126 lb (57.2 kg)  SpO2 100%  BMI 25.45 kg/m2    General: Alert, well-appearing, no acute distress  HEENT: PERRL, moist oral mucus membranes  Pulm: clear to auscultation bilaterally, no tachypnea  CV: RRR, no murmur  Abd: soft, non-distended, no masses, no guarding no rebound, mild tenderness just below umbilicus midline  Ext: Warm, well perfused, no LE edema  Skin: No rash on limited skin exam  : normal external genitalia, closed cervix no bleeding, some thick white discharge present, " no cervical motion tenderness.   Psych: Mood appropriate to visit content, full affect, rational thought content and process      A/P:  1. Suprapubic pain- sounds musculoskeletal with onset after heavy lifting. No vaginal bleeding, some white discharge with yeast on wet prep, no cervical motion tenderness. We did obtain stat ultrasound to rule out ectopic pregnancy which showed intrauterine gestation at 5 weeks. LE and blood on UA but without dysuria or urinary symptoms likely secondary to yeast infection.  - HCG Qualitative Urine (UPT)  ([facility])  - Urinalysis, Micro If ([facility])  - Urine Microscopic ([facility])  - US Pelvic Complete with Transvaginal; Future  - Wet Prep ([facility])    2. Candidiasis of vagina  Cheesy discharge present and yeast on wet prep. Will treat topically due to pregnancy.   - miconazole (EQ MICONAZOLE 3 COMBO PACK) 200 & 2 MG-% (9GM) KIT; Place 1 each vaginally once for 1 dose  Dispense: 1 each; Refill: 0    Lary Antunez MD  [facility] Medicine Resident  Pager     Precepted with: Claudia Preciado MD

## 2018-05-15 NOTE — LETTER
May 18, 2018      Sydney Dao  [address] [place] [address]        Dear Sydney,    We have been unable to reach you by phone.  Please call the clinic to complete an OB intake with one of our OB Nurses/Coordinator and get scheduled for your first prenatal appointment with your physician.  Your ultrasound report looks good, shows 5 week 3 day pregnancy with ADALI 1/14/2019     Your wet prep/vaginal test was positive for yeast so prescription has been sent for topical miconazole.     Your belly pain may probably be muscle strain from lifting that heavy box, but if you develop vaginal bleeding, vomiting/diarrhea, fever/chills, or worsening pain she should be seen right away.         Sincerely,      Ana Rosa Washington RN

## 2018-05-17 ENCOUNTER — TELEPHONE (OUTPATIENT)
Dept: FAMILY MEDICINE | Facility: CLINIC | Age: 28
End: 2018-05-17

## 2018-05-17 NOTE — TELEPHONE ENCOUNTER
Sydney Dao is a 27yo Hmong speaking female who is seeking OB care at [facility]. Sydney was born in [facility] and has 8th grade [facility] education. She is not currently working but her spouse, Ariana White, currently works in medical assembly. She would like Ariana as her emergency contact during the pregnancy. Sydney is , previous deliveries were  and uncomplicated. No anesthesia used in both deliveries. Sydney would like to be followed by a female provider during her OB care.     Average Risk Category  No significant risk factors: No    At Risk Category (up to 3)  Teen pregnancy: No  Poor social situation: No  Domestic abuse: No  Financial difficulties: No  Smoker: No  H/O  deliver: No  H/O drug abuse: No  Non-English speaking: Yes  Advanced maternal age: No  GDM risks: No  Previous C/S: No  H/O PIH: No  H/O STIs: No  H/O mental health concerns: No  Onset care > 20 weeks: No  Other:     High Risk Category (4 or more At Risk or)  Diabetes/GDM: No  Multiple gestation: No  Chronic hypertension: No  Significant hx of asthma: No  Fetal demise > 20 weeks: No  Positive tox screen: No  Current mental health treatment: No  Other:     Risk: Average Risk   Date determined: Abril LOPEZ

## 2018-05-18 NOTE — PROGRESS NOTES
Preceptor Attestation:  Patient's case reviewed and discussed with  Patient seen and discussed with the resident.  I agree with written assessment and plan of care.  Supervising Physician:  Claudia Perciado MD  [facility]

## 2018-05-18 NOTE — PROGRESS NOTES
Multiple phone attempts to reach Sydney Padilla at both pt listed number and emergency contact- not working phone numbers. Will send letter to patient and ask she call the clinic. Marquez LOPEZ

## 2018-05-21 DIAGNOSIS — R10.2 SUPRAPUBIC PAIN: ICD-10-CM

## 2018-06-06 ENCOUNTER — OFFICE VISIT (OUTPATIENT)
Dept: FAMILY MEDICINE | Facility: CLINIC | Age: 28
End: 2018-06-06
Payer: COMMERCIAL

## 2018-06-06 VITALS
DIASTOLIC BLOOD PRESSURE: 65 MMHG | WEIGHT: 124 LBS | TEMPERATURE: 97.8 F | BODY MASS INDEX: 25 KG/M2 | RESPIRATION RATE: 16 BRPM | HEART RATE: 71 BPM | OXYGEN SATURATION: 99 % | HEIGHT: 59 IN | SYSTOLIC BLOOD PRESSURE: 101 MMHG

## 2018-06-06 DIAGNOSIS — B96.89 BACTERIAL VAGINOSIS: ICD-10-CM

## 2018-06-06 DIAGNOSIS — Z34.91 PREGNANT AND NOT YET DELIVERED IN FIRST TRIMESTER: Primary | ICD-10-CM

## 2018-06-06 DIAGNOSIS — N76.0 BACTERIAL VAGINOSIS: ICD-10-CM

## 2018-06-06 DIAGNOSIS — O21.9 NAUSEA/VOMITING IN PREGNANCY: ICD-10-CM

## 2018-06-06 LAB
BACTERIA: NORMAL
BILIRUBIN UR: ABNORMAL
BLOOD UR: NEGATIVE
CLUE CELLS: NORMAL
GLUCOSE URINE: NEGATIVE
HEMOGLOBIN: 14 G/DL (ref 11.7–15.7)
HIV 1+2 AB+HIV1 P24 AG SERPL QL IA: NEGATIVE
KETONES UR QL: NEGATIVE
LEUKOCYTE ESTERASE UR: NEGATIVE
MOTILE TRICHOMONAS: NEGATIVE
NITRITE UR QL STRIP: NEGATIVE
ODOR: NORMAL
PH UR STRIP: 7 [PH] (ref 5–7)
PH WET PREP: NORMAL
PROTEIN UR: ABNORMAL
SP GR UR STRIP: 1.01
UROBILINOGEN UR STRIP-ACNC: ABNORMAL
WBC WET PREP: NORMAL
YEAST: NORMAL

## 2018-06-06 RX ORDER — PRENATAL VIT/IRON FUM/FOLIC AC 27MG-0.8MG
1 TABLET ORAL DAILY
Qty: 100 TABLET | Refills: 3 | Status: SHIPPED | OUTPATIENT
Start: 2018-06-06 | End: 2019-07-26

## 2018-06-06 RX ORDER — PYRIDOXINE HCL (VITAMIN B6) 25 MG
25 TABLET ORAL DAILY
Qty: 30 TABLET | Refills: 1 | Status: SHIPPED | OUTPATIENT
Start: 2018-06-06 | End: 2018-07-06

## 2018-06-06 NOTE — PROGRESS NOTES
Sydney Dao is a 28 year old  female who presents to clinic for a new OB visit.  Her last menstrual period was Patient's last menstrual period was 2018 (approximate)..  Estimated Date of Delivery: 2019 via 5w3d US.    She has not had bleeding since her LMP. She has not had any abdominal pain or cramping since her LMP. She has had mild nausea. Weight loss has occurred, for a total of 2 pounds. This was a planned pregnancy and she is excited.     Concerns: lightheaded, dizzy with standing in am. Doesn't drink fluids in am due to nausea.   Had yeast infection last visit, but not symptomatic so didn't take yeast medication.     Social: 2 girls ages 4 and 5; . No one else at home. 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     Prior pregnancies: full term 1-2 wks before due date. No DM or HTN.     Pre Term Labor Risk Assessment  Is the patient's age <18 or >40? No  Patient's BMI is Body mass index is 25.04 kg/(m^2)..   If previous pregnancy, was delivery within previous 6 months? No  Have you ever delivered a baby prior to 37 weeks gestation? No  Did conception for this pregnancy occur via In Vitro Fertilization? No  Summary: Patient is not high risk for  Labor for  labor.    Patient Active Problem List   Diagnosis     Screening for cervical cancer     Obstetric History       T2      L2     SAB0   TAB0   Ectopic0   Multiple0   Live Births1       # Outcome Date GA Lbr Ajay/2nd Weight Sex Delivery Anes PTL Lv   3 Current            2 Term 13 38w2d 09:00 5 lb (2.268 kg) F  None N BARBIE      Name: Sealily   1 Term                 No past medical history on file.  Past Surgical History:   Procedure Laterality Date     NO HISTORY OF SURGERY  2013     Current Outpatient Prescriptions   Medication Sig Dispense Refill     acetaminophen (TYLENOL) 325 MG tablet Take 2 tablets (650 mg) by mouth every 6 hours as needed for mild pain 100 tablet 0     Prenatal Vit-Fe Fumarate-FA  (PRENATAL MULTIVITAMIN PLUS IRON) 27-0.8 MG TABS per tablet Take 1 tablet by mouth daily 100 tablet 3       Do you have a history of any of the following medical conditions?  Condition Yes/No   Hypertension No   Heart disease, mitral valve prolapse, rheumatic fever No   Asthma or another chronic lung disease No   An autoimmune disorder No   Kidney disease No   Frequent urinary tract infections No   Migraine headaches No   Stroke, loss of sensation/function, seizures, or other neuro problem No   Diabetes No   Thyroid problems or have you taken thyroid medication No   Hepatitis, liver disease, jaundice No   Blood clots, phlebitis, pulmonary embolism or varicose veins No   Excessive bleeding after surgery or dental work No   Heavy menstrual periods requiring treatment No   Anemia No   Blood transfusions No        Would you refuse a blood transfusion? No   Breast problems No   Abnormalities of the uterus No   Abnormal pap smear No   Have you been treated for an emotional disturbance? No   Have you been physically, sexually, or emotionally hurt by someone? No   Have you been in a major accident or suffered serious trauma? No   Have you had surgical procedures? No        Have you ever had any complications from anesthesia? No   Have you ever been hospitalized for a nonsurgical reason? No   Notes for positives: none    Prenatal Genetic Screening  Do you have a history of any of the following Yes/No        A metabolic disorder (e.g. Insulin-dependent DM, PKU) No        Recurrent pregnancy loss No     Do you, the baby's father, or anyone in your families have Yes/No        Thalassemia AND MCV <80 No        Hemophilia No        Neural tube defect No        Congenital heart defect No        Sickle cell disease or trait No        Muscular dystrophy No        Cystic fibrosis No        Mental retardation or autism No        Down's syndrome No        Yonny-Sach's disease No        Ashley's chorea No        Any other inherited  "genetic or chromosomal disorder No        A child with birth defects not listed above No     Infection History  At high risk for coming in contact with HIV No   Ever treated for tuberculosis No   Ever received the BCG vaccine for tuberculosis No   Ever had genital herpes (or has your partner) No   Had a rash or viral illness since LMP No   Ever had a sexually transmitted infection No   Ever had chicken pox or the vaccine Yes   Ever had any other serious infectious disease No   Up to date with immunizations Yes   STI History none      PERSONAL/SOCIAL HISTORY  Social History     Social History     Marital status:      Spouse name: Ariana White     Number of children: 1     Years of education: 8th [place]     Occupational History     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 none    REVIEW OF SYSTEMS   C: NEGATIVE for fever, chills, change in weight  E: NEGATIVE for vision changes or irritation  ENT: NEGATIVE for ear, mouth and throat problems  R: NEGATIVE for significant cough or SOB  B: NEGATIVE for masses, tenderness or discharge  CV: NEGATIVE for chest pain, palpitations or peripheral edema  GI: NEGATIVE for nausea, abdominal pain, heartburn, or change in bowel habits  : NEGATIVE for unusual urinary or vaginal symptoms.   M: NEGATIVE for significant arthralgias or myalgia  N: NEGATIVE for weakness, dizziness or paresthesias  P: NEGATIVE for changes in mood or affect    PHYSICAL EXAM:  /65  Pulse 71  Temp 97.8  F (36.6  C) (Oral)  Resp 16  Ht 4' 11\" (149.9 cm)  Wt 124 lb (56.2 kg)  LMP 03/13/2018 (Approximate)  SpO2 99%  BMI 25.04 kg/m2 "   GENERAL:  Pleasant pregnant female, alert, well groomed.  SKIN:  Warm and dry, without lesions or rashes  EYES:  PERRLA, EOM intact  MOUTH:  Buccal mucosa pink, moist without lesions.    NECK:  Thyroid without enlargement and nodules.  No cervical lymphadenopathy.  LUNGS:  Clear to auscultation.  BREAST:  Symmetrical. No masses noted. No skin or nipple changes or axillary nodes.   HEART:  RRR without murmur.  ABDOMEN: Soft without masses , tenderness or organomegaly.  No CVA tenderness. No scars noted.. Fundus palpable at symphysis pubis. FHT not present  MUSCULOSKELETAL:  Full range of motion.  EXTREMITIES:  No edema. No significant varicosities.   GENITALIA:  Normal appearing anatomy, no lesions noted.  VAGINA:  Pink, normal rugae and discharge normal and physiologic,   CERVIX:  smooth, without discharge and parous os; firm, closed and long on bimanual exam.  UTERUS: Midposition, nontender 8 weeks in size.  ADNEXA: Without masses or tenderness    ASSESSMENT/PLAN  Patient Active Problem List   Diagnosis     Screening for cervical cancer     - Routine prenatal labs today. CBC, type and screen, rubella, HIV, gonorrhea/chlamydia, RPR, hepatitis B, urinalysis with culture.   - Pap smear up to date, next pap due 2 yrs.   - Early ultrasound for dating discussed. Patient concerned about cost of repeat ultrasound through 's insurance. She will see Souk today to apply for MA. I ordered repeat dating ultrasound; if she does the ultrasound, plan to follow up in 4 weeks; if she does not do the ultrasound, plan to follow up in 2 weeks to listen for heart tones (in order to verify dating).   - Referral(s): none  - vit B6 and doxylamine for nausea given weight loss.     Discussed:  - recommended weight gain of 25-35 lbs. for BMI of Body mass index is 25.04 kg/(m^2)..  - Influenza vaccine not available at this time.  - genetic screening options, including false positive rate of 5% with screening tests and diagnostic  options (chorionic villus sampling, amniocentesis), and patient declines.. .  - safe medications during pregnancy.   - Is currently taking prenatal vitamin daily.   - healthy habits including not using tobacco or alcohol, exercising regularly and maintaining healthy diet  - information given on tips for dealing with nausea, healthy habits, exposures, safety, prenatal appointment schedule, and when to call the doctor.  - recommendations for breastfeeding.    Readdress at next visit: nausea, weight loss, lab results, heart tones    Will follow up in 2-4 weeks (depending on whether she completes ultrasound) for routine pre-colleen care.    Tamera Peter MD, MPH  [facility] Family Medicine Resident    Precepted patient with Jluis Bowers MD

## 2018-06-06 NOTE — PATIENT INSTRUCTIONS
If you do not do the ultrasound, come back in 2 weeks.     [facility] Information  If you have any further concerns or wish to schedule another appointment, please call our office at [phone] during normal business hours (8-5, M-F).     For uncomplicated pregnancies, you will be seeing your doctor once a month until you are 28 weeks, then you will see your doctor twice a month. You will begin weekly visits at 36 weeks until you deliver.     If you have urgent medical questions that cannot wait, you may call [phone] at any time of day. Call your doctor if you experience any bleeding or abdominal cramping early in pregnancy.     If you have a medical emergency, please call [phone].  Tips to Relieve Nausea  Although nausea can occur at any time of the day, it may be worse in the morning. To help prevent nausea:  Eat small, light meals at frequent intervals.  Get up slowly. Eat a few unsalted crackers before you get out of bed.  Drink water with lemon slices or 7-up to soothe your stomach.  Eat an ice pop in your favorite flavor.  Ask your health care provider about taking heriberto or vitamin B6 for nausea and vomiting.  Talk with your health care provider if you take vitamins that upset your stomach.  Safe Medications  Take one prenatal vitamin with at least 400 mcg of folic acid daily.  Use acetaminophen (Tylenol) for pain.   Try Maalox or Tums for heartburn. If these are not working, talk to your doctor about trying a different medication.  Diphenhydramine (Benadryl) can also be used safely in pregnancy.  Talk to your doctor about any other medications or vitamins you are taking!  Start Healthy Habits Now  What matters most is protecting your baby from this moment on. If you smoke, drink alcohol, or use drugs, now is the time to stop. If you need help, talk with your health care provider.  Smoking increases the risk of miscarriage or having a low-birth-weight baby. If you smoke, quit now.  Alcohol and  drugs have been linked with miscarriage, birth defects, intellectual disability, and low birth weight. Do not drink alcohol or take drugs.  Continue your current exercise routine, or start one with walking, swimming, and other low impact exercises. Yoga specifically designed for pregnant moms is a great stress and pain reliever. Keep your self well hydrated and avoid overheating with all activity. If bleeding, fluid leakage, or cramping/contractions occur, stop the exercise and call your doctor.   Wear your seatbelt every time you are in the car. Fasten the lap part underneath your growing belly and the shoulder part as you normally would.   Weight Gain  Add an additional 300 to 400 calories a day into your diet.  Ideal weight gain is 25 to 35 pounds.  If your BMI is over 30, your ideal weight gain is 15 pounds.  If your BMI is under 20, your ideal weight gain is 40 pounds.  Talk to your doctor if you are concerned about weight gain.   Keep Your Environment Save  You can still clean house and use scented products. Just take some simple precautions:  Wear gloves when using cleaning fluids.  Open windows to let in fresh air. Use a fan if you paint.  Avoid secondhand smoke.  Don t breathe fumes from nail polish, hair spray, cleansers, or other chemicals.  Work Concerns  The end of the first trimester is a good time to discuss working during pregnancy with your employer. Follow your health care provider s advice if your job requires you to stand for a long time, work with hazardous tools, or even sit at a desk all day. Your workspace, workload, or scheduled hours may need to be adjusted - perhaps you can change body postures more often or take an extra break.  Intimacy  Unless your health care provider tells you to, there is no reason to stop having sex while you re pregnant. You or your partner may notice changes in desire. Desire may be less in the first trimester, due to nausea and fatigue. In the second trimester,  sex may be very enjoyable. The third trimester can be a challenge comfort-wise. Try different positions and see what s best for you both. As always, let your doctor know if you experience any bleeding, leakage of fluids, or cramping/contractions.  Breastfeeding  Breast feeding is best, for you and baby!   Recommendations are for exclusive breastfeeding for babies first 6 months of life.  Other Pregnancy Concerns  Limit the amount of radiation you are exposed to. Always tell the radiology technologist that you are pregnant if having an xray or CT scan done.   Practice good hand washing to prevent infection.  Avoid cat litter.   Wash all fruits and vegetabes. Always cook meat thoroughly and do not eat raw fish. Do not eat more than 6 to 12 ounces of other fish sources.   Limit caffeine to less than 300 milligrams a days. The average cup of coffee as approximately 120 milligrams of caffeine.

## 2018-06-06 NOTE — LETTER
June 8, 2018      Sydney Fortinobasilio Dao  [address] [place] [address]        Dear Sydney,    The test results from your last visit are back.   - Your blood type is O positive.   - You are immune to rubella.     - You are immune to varicella zoster, which is the virus that causes chicken pox and shingles.   - You do not have syphilis, HIV, or hepatitis B.   - You are immune to hepatitis B.   - You do not have a yeast infection anymore, so we do not have to treat for that.   - You do have bacterial vaginosis, which is an overgrowth of the normal bacteria in your vagina. Because this can affect your pregnancy, I have sent a prescription for metronidazole (flagyl) to your pharmacy. Take the medication in the morning and evening for 7 days.   - You do not have gonorrhea or chlamydia.   - You do not have bacteria in your urine.   - Your hemoglobin is 14. This is in the normal range for pregnancy.     Please see below for your test results.    Resulted Orders   ABO/Rh Type-HML ([facility])   Result Value Ref Range    ABO/Rh(D) O POS     Repeat ABO/Rh Typing ([facility]) O POS     Narrative    Test performed by:   [facility]  [address], [place] [address]   [email or web address]   Result Value Ref Range    Antibody Screen Negative Negative    Narrative    Test performed by:   BLO[facility]  [address], [place] [address]   Urinalysis([facility])   Result Value Ref Range    Specific Gravity Urine 1.015 1.005 - 1.030    pH Urine 7.0 4.5 - 8.0    Leukocyte Esterase UR Negative NEGATIVE    Nitrite Urine Negative NEGATIVE    Protein UR 1+ (A) NEGATIVE    Glucose Urine Negative NEGATIVE    Ketones Urine Negative NEGATIVE    Urobilinogen mg/dL 1.0 E.U./dL (A) 0.2 E.U./dL    Bilirubin UR 1+ (A) NEGATIVE    Blood UR Negative NEGATIVE   Hemoglobin (HGB) ([facility])   Result Value Ref Range    Hemoglobin 14.0 11.7 - 15.7 g/dL   Hepatitis B Surface Ag ([facility])   Result Value Ref Range    Hepatitis B Surface Antigen Negative  Negative    Narrative    Test performed by:  [facility] [address] [place] [address]   Syphilis Screen [place] ([facility])   Result Value Ref Range    Treponema Antibody (Syphilis) Negative Negative    Narrative    Test performed by:  [facility] [address] [place] [address]   HIV Ag/Ab Screen [place] ([facility])   Result Value Ref Range    HIV Antigen/Antibody Negative Negative    Narrative    Test performed by:  [facility] [address] [place] [address]  Method is Abbott HIV Ag/Ab for the detection of HIV p24 antigen, HIV-1   antibodies and HIV-2 antibodies.   Culture Urine ([facility])   Result Value Ref Range    Culture SEE RESULTS BELOW       Comment:      CULTURE, URINE   SOURCE: Urine, Clean Catch   CULTURE RESULTS:    No Growth      Narrative    Test performed by:  [facility] [address] [place] [address]   Rubella  IgG ([facility])   Result Value Ref Range    Rubella IgG Positive     Narrative    Test performed by:  [facility] [address] [place] [address]  Negative: Absence of detectable rubella virus IgG antibodies. A negative   result presumes that immunity has not been acquired.   Equivocal: Suggest recollection.  Positive: Considered positive for IgG antibodies to rubella virus.   Chlamydia/Gono Amplified ([facility])   Result Value Ref Range    Chlamydia trac,Amplified Prb Negative Negative    N gonorrhoeae,Amplified Prb Negative Negative    Narrative    Test performed by:  [facility] [address] [place] [address]   Wet Prep ([facility])   Result Value Ref Range    Yeast Wet Prep None none    Motile Trichomonas Wet Prep Negative Negative    Clue Cells Wet Prep Present >20% NONE    WBC WET PREP 5-10 2 - 5    Bacteria Wet Prep Moderate None    pH Wet Prep Not performed 3.8 - 4.5    Odor Wet Prep None NONE       If you have any questions, please call the clinic to make an  appointment.    Sincerely,    Tamera Peter MD

## 2018-06-06 NOTE — NURSING NOTE
Due to patient being non-English speaking/uses sign language, an  was used for this visit. Only for face-to-face interpretation by an external agency, date and length of interpretation can be found on the scanned worksheet.     name: Paige  Agency: [employer]  Language: May   Telephone number: [phone]  Type of interpretation: Face-to-face, spoken

## 2018-06-06 NOTE — MR AVS SNAPSHOT
After Visit Summary   6/6/2018    Sydney Dao    MRN: [id number]           Patient Information     Date Of Birth          1990        Visit Information        Provider Department      6/6/2018 8:40 AM Tamera Peter MD [facility]        Today's Diagnoses     Pregnant and not yet delivered in first trimester    -  1    Nausea/vomiting in pregnancy        Bacterial vaginosis          Care Instructions    If you do not do the ultrasound, come back in 2 weeks.     [facility] Information  If you have any further concerns or wish to schedule another appointment, please call our office at [phone] during normal business hours (8-5, M-F).     For uncomplicated pregnancies, you will be seeing your doctor once a month until you are 28 weeks, then you will see your doctor twice a month. You will begin weekly visits at 36 weeks until you deliver.     If you have urgent medical questions that cannot wait, you may call [phone] at any time of day. Call your doctor if you experience any bleeding or abdominal cramping early in pregnancy.     If you have a medical emergency, please call [phone].  Tips to Relieve Nausea  Although nausea can occur at any time of the day, it may be worse in the morning. To help prevent nausea:  Eat small, light meals at frequent intervals.  Get up slowly. Eat a few unsalted crackers before you get out of bed.  Drink water with lemon slices or 7-up to soothe your stomach.  Eat an ice pop in your favorite flavor.  Ask your health care provider about taking heriberto or vitamin B6 for nausea and vomiting.  Talk with your health care provider if you take vitamins that upset your stomach.  Safe Medications  Take one prenatal vitamin with at least 400 mcg of folic acid daily.  Use acetaminophen (Tylenol) for pain.   Try Maalox or Tums for heartburn. If these are not working, talk to your doctor about trying a different medication.  Diphenhydramine (Benadryl)  can also be used safely in pregnancy.  Talk to your doctor about any other medications or vitamins you are taking!  Start Healthy Habits Now  What matters most is protecting your baby from this moment on. If you smoke, drink alcohol, or use drugs, now is the time to stop. If you need help, talk with your health care provider.  Smoking increases the risk of miscarriage or having a low-birth-weight baby. If you smoke, quit now.  Alcohol and drugs have been linked with miscarriage, birth defects, intellectual disability, and low birth weight. Do not drink alcohol or take drugs.  Continue your current exercise routine, or start one with walking, swimming, and other low impact exercises. Yoga specifically designed for pregnant moms is a great stress and pain reliever. Keep your self well hydrated and avoid overheating with all activity. If bleeding, fluid leakage, or cramping/contractions occur, stop the exercise and call your doctor.   Wear your seatbelt every time you are in the car. Fasten the lap part underneath your growing belly and the shoulder part as you normally would.   Weight Gain  Add an additional 300 to 400 calories a day into your diet.  Ideal weight gain is 25 to 35 pounds.  If your BMI is over 30, your ideal weight gain is 15 pounds.  If your BMI is under 20, your ideal weight gain is 40 pounds.  Talk to your doctor if you are concerned about weight gain.   Keep Your Environment Save  You can still clean house and use scented products. Just take some simple precautions:  Wear gloves when using cleaning fluids.  Open windows to let in fresh air. Use a fan if you paint.  Avoid secondhand smoke.  Don t breathe fumes from nail polish, hair spray, cleansers, or other chemicals.  Work Concerns  The end of the first trimester is a good time to discuss working during pregnancy with your employer. Follow your health care provider s advice if your job requires you to stand for a long time, work with hazardous  tools, or even sit at a desk all day. Your workspace, workload, or scheduled hours may need to be adjusted - perhaps you can change body postures more often or take an extra break.  Intimacy  Unless your health care provider tells you to, there is no reason to stop having sex while you re pregnant. You or your partner may notice changes in desire. Desire may be less in the first trimester, due to nausea and fatigue. In the second trimester, sex may be very enjoyable. The third trimester can be a challenge comfort-wise. Try different positions and see what s best for you both. As always, let your doctor know if you experience any bleeding, leakage of fluids, or cramping/contractions.  Breastfeeding  Breast feeding is best, for you and baby!   Recommendations are for exclusive breastfeeding for babies first 6 months of life.  Other Pregnancy Concerns  Limit the amount of radiation you are exposed to. Always tell the radiology technologist that you are pregnant if having an xray or CT scan done.   Practice good hand washing to prevent infection.  Avoid cat litter.   Wash all fruits and vegetabes. Always cook meat thoroughly and do not eat raw fish. Do not eat more than 6 to 12 ounces of other fish sources.   Limit caffeine to less than 300 milligrams a days. The average cup of coffee as approximately 120 milligrams of caffeine.                Follow-ups after your visit        Follow-up notes from your care team     Return in about 4 weeks (around 7/4/2018) for OB.      Your next 10 appointments already scheduled     Jul 06, 2018  9:20 AM CDT   RETURN OB with Tamera Peter MD   [facility] ([facility])    [address] [place] [address]   [phone]              Who to contact     Please call your clinic at [phone] to:    Ask questions about your health    Make or cancel appointments    Discuss your medicines    Learn about your test results    Speak to your doctor             "Additional Information About Your Visit        [employer] Information     Sentient is an electronic gateway that provides easy, online access to your medical records. With Sentient, you can request a clinic appointment, read your test results, renew a prescription or communicate with your care team.     To sign up for Sentient visit the website at [email or web address]   You will be asked to enter the access code listed below, as well as some personal information. Please follow the directions to create your username and password.     Your access code is: [email or web address]  Expires: 8/15/2018  7:19 PM     Your access code will  in 90 days. If you need help or a new code, please contact your [facility] or call [phone] for assistance.        Care EveryWhere ID     This is your Care EveryWhere ID. This could be used by other organizations to access your [facility] medical records  [id number]        Your Vitals Were     Pulse Temperature Respirations Height Last Period Pulse Oximetry    71 97.8  F (36.6  C) (Oral) 16 4' 11\" (149.9 cm) 2018 (Approximate) 99%    BMI (Body Mass Index)                   25.04 kg/m2            Blood Pressure from Last 3 Encounters:   18 101/65   05/15/18 96/61   10/02/17 103/69    Weight from Last 3 Encounters:   18 124 lb (56.2 kg)   05/15/18 126 lb (57.2 kg)   10/02/17 121 lb 12.8 oz (55.2 kg)              We Performed the Following     ABO/Rh Type-HML ([facility])     Antibody Screen ([facility])     Chlamydia/Gono Amplified ([facility])     Culture Urine ([facility])     Hemoglobin (HGB) ([facility])     Hepatitis B Surface Ag ([facility])     HIV Ag/Ab Screen [place] ([facility])     Rubella  IgG ([facility])     Syphilis Screen [place] ([facility])     Urinalysis([facility])     US OB <14 WKS SINGLE OR FIRST GESTATION     Wet Prep ([facility])          Today's Medication Changes          These changes are accurate as of 18 11:59 PM.  If you " have any questions, ask your nurse or doctor.               Start taking these medicines.        Dose/Directions    doxylamine 25 MG Tabs tablet   Commonly known as:  UNISOM   Used for:  Nausea/vomiting in pregnancy   Started by:  Tamera Peter MD        Dose:  12.5 mg   Take 0.5 tablets (12.5 mg) by mouth At Bedtime   Quantity:  14 each   Refills:  0       metroNIDAZOLE 500 MG tablet   Commonly known as:  FLAGYL   Used for:  Bacterial vaginosis   Started by:  Tamera Peter MD        Dose:  500 mg   Take 1 tablet (500 mg) by mouth 2 times daily   Quantity:  14 tablet   Refills:  0       pyridOXINE 25 MG tablet   Commonly known as:  vitamin B-6   Used for:  Pregnant and not yet delivered in first trimester, Nausea/vomiting in pregnancy   Started by:  Tamera Peter MD        Dose:  25 mg   Take 1 tablet (25 mg) by mouth daily   Quantity:  30 tablet   Refills:  1            Where to get your medicines      These medications were sent to [facility]/pharmacy #[id number] - [place], [place] - [address]  [address], [place] [address]     Phone:  [phone]     doxylamine 25 MG Tabs tablet    metroNIDAZOLE 500 MG tablet    prenatal multivitamin plus iron 27-0.8 MG Tabs per tablet    pyridOXINE 25 MG tablet                Primary Care Provider Office Phone # Fax #    Lashawn Ojeda [phone]       [facility] [address] [place] [address]        Equal Access to Services     [facility] AH: Hadii dorene hernadez hadasho Soterryali, waaxda luqadaha, qaybta kaalmada adeegyada, sofia crabtree. So [facility] [phone].    ATENCIÓN: Si habla español, tiene a mcclelland disposición servicios gratuitos de asistencia lingüística. Llame al [phone].    We comply with applicable federal civil rights laws and [place] laws. We do not discriminate on the basis of race, color, national origin, age, disability, sex, sexual orientation, or gender  identity.            Thank you!     Thank you for choosing [facility]  for your care. Our goal is always to provide you with excellent care. Hearing back from our patients is one way we can continue to improve our services. Please take a few minutes to complete the written survey that you may receive in the mail after your visit with us. Thank you!             Your Updated Medication List - Protect others around you: Learn how to safely use, store and throw away your medicines at www.disposemymeds.org.          This list is accurate as of 6/6/18 11:59 PM.  Always use your most recent med list.                   Brand Name Dispense Instructions for use Diagnosis    acetaminophen 325 MG tablet    TYLENOL    100 tablet    Take 2 tablets (650 mg) by mouth every 6 hours as needed for mild pain    Back pain       doxylamine 25 MG Tabs tablet    UNISOM    14 each    Take 0.5 tablets (12.5 mg) by mouth At Bedtime    Nausea/vomiting in pregnancy       metroNIDAZOLE 500 MG tablet    FLAGYL    14 tablet    Take 1 tablet (500 mg) by mouth 2 times daily    Bacterial vaginosis       prenatal multivitamin plus iron 27-0.8 MG Tabs per tablet     100 tablet    Take 1 tablet by mouth daily    Pregnant and not yet delivered in first trimester       pyridOXINE 25 MG tablet    vitamin B-6    30 tablet    Take 1 tablet (25 mg) by mouth daily    Pregnant and not yet delivered in first trimester, Nausea/vomiting in pregnancy

## 2018-06-07 LAB
ABO + RH BLD: NORMAL
BLD GP AB SCN SERPL QL: NEGATIVE
C TRACH RRNA CVX QL NAA+PROBE: NEGATIVE
CULTURE: NORMAL
HBV SURFACE AG SERPL QL IA: NEGATIVE
N GONORRHOEA RRNA SPEC QL NAA+PROBE: NEGATIVE
REPEAT ABO/RH TYPING (HML): NORMAL
RUBV IGG SERPL QL IA: POSITIVE
TREPONEMA ANTIBODY (SYPHILIS): NEGATIVE

## 2018-06-07 RX ORDER — METRONIDAZOLE 500 MG/1
500 TABLET ORAL 2 TIMES DAILY
Qty: 14 TABLET | Refills: 0 | Status: SHIPPED | OUTPATIENT
Start: 2018-06-07 | End: 2018-12-19

## 2018-06-13 NOTE — PROGRESS NOTES
Preceptor Attestation:  Patient's case reviewed and discussed with Tamera Peter MD resident and I evaluated the patient. I agree with written assessment and plan of care.  Supervising Physician:  Jluis Bowers MD MD  [facility]

## 2018-06-22 ENCOUNTER — TELEPHONE (OUTPATIENT)
Dept: FAMILY MEDICINE | Facility: CLINIC | Age: 28
End: 2018-06-22

## 2018-06-22 NOTE — TELEPHONE ENCOUNTER
I got a message from Carolina one of our triage nurse about the pt.  The pt had asked her a medical insurance question that she did not know much about.  She wanted me to call the pt and see if I can help her with it.  I called the pt, but the pt phone was not in services.

## 2018-07-06 ENCOUNTER — OFFICE VISIT (OUTPATIENT)
Dept: FAMILY MEDICINE | Facility: CLINIC | Age: 28
End: 2018-07-06
Payer: COMMERCIAL

## 2018-07-06 VITALS
HEART RATE: 68 BPM | BODY MASS INDEX: 25.16 KG/M2 | RESPIRATION RATE: 18 BRPM | HEIGHT: 59 IN | WEIGHT: 124.8 LBS | SYSTOLIC BLOOD PRESSURE: 101 MMHG | OXYGEN SATURATION: 100 % | DIASTOLIC BLOOD PRESSURE: 69 MMHG | TEMPERATURE: 97.8 F

## 2018-07-06 DIAGNOSIS — R80.9 PROTEINURIA, UNSPECIFIED TYPE: ICD-10-CM

## 2018-07-06 DIAGNOSIS — Z34.92 PREGNANT AND NOT YET DELIVERED IN SECOND TRIMESTER: Primary | ICD-10-CM

## 2018-07-06 DIAGNOSIS — N94.89 UTERINE CRAMPING: ICD-10-CM

## 2018-07-06 LAB
BACTERIA: NORMAL
CLUE CELLS: NEGATIVE
MOTILE TRICHOMONAS: NEGATIVE
ODOR: NEGATIVE
PH WET PREP: NORMAL
WBC WET PREP: NEGATIVE
YEAST: NEGATIVE

## 2018-07-06 NOTE — PROGRESS NOTES
"History   Sydney Dao is a 28 year old  female who presents to clinic for a follow up OB visit.   - 12w6d with ADALI 2019  - No headache, chest pain, shortness of breath, vaginal bleeding, or abnormal discharge.    - Nausea/vomiting resolved. Weight loss 2 lbs, unchanged from last visit. Lost weight in other pregnancies.  - is having some cramping  - did not take metronidazole after last appt    A [employer]  was used for this visit.   Medical and social history and medications reviewed with patient.   Exam   /69  Pulse 68  Temp 97.8  F (36.6  C) (Oral)  Resp 18  Ht 4' 11.06\" (150 cm)  Wt 124 lb 12.8 oz (56.6 kg)  LMP 2018 (Approximate)  SpO2 100%  BMI 25.16 kg/m2  General: NAD  Abdomen: gravid uterus appropriate for gestation age above pubic symphysis, non-tender, FHTs 150  Extremities: no edema  Medical Decision-Making     Patient Active Problem List   Diagnosis     Pregnant and not yet delivered in first trimester     Screening for cervical cancer     - Reviewed pre-colleen labs.   - Reviewed genetic screening options, including false positive rate of 5% with screening tests and diagnostic options (chorionic villus sampling, amniocentesis), and patient declines.   - Discussed warning signs to watch for and expectations for second trimester.     1. Pregnant and not yet delivered in second trimester  2. Uterine cramping  Clue cells on wet prep last month.   - Wet Prep ([facility]); would treat if bv given uterine cramping  3. Proteinuria, unspecified type  - Protein  random urine with Creat Ratio    Follow up: 4 weeks for routine prenatal visit  Readdress: routine    Tamera Peter MD, MPH  [facility] Resident     Precepted patient with Bert Mike MD    Options for treatment and follow-up care were reviewed with the patient and/or guardian. Sydney Dao and/or guardian engaged in the decision making process and verbalized understanding of the options discussed " and agreed with the final plan.

## 2018-07-06 NOTE — MR AVS SNAPSHOT
After Visit Summary   7/6/2018    Sydney Dao    MRN: [id number]           Patient Information     Date Of Birth          1990        Visit Information        Provider Department      7/6/2018 9:20 AM Tamera Peter MD [facility]        Today's Diagnoses     Pregnant and not yet delivered in second trimester    -  1    Uterine cramping        Proteinuria, unspecified type          Care Instructions    [facility] Information  If you have any further concerns or wish to schedule another appointment, please call our office at [phone] during normal business hours (8-5, M-F).     For uncomplicated pregnancies, you will be seeing your doctor once a month until you are 28 weeks, then you will see your doctor twice a month. You will begin weekly visits at 36 weeks until you deliver.     If you have urgent medical questions that cannot wait, you may call [phone] at any time of day. Call your doctor if you experience any bleeding or abdominal cramping early in pregnancy.     If you have a medical emergency, please call [phone].    When to call or come in to the hospital  If you have any bleeding or leakage of fluids.   If you have uterine cramping that does not resolve with rest and fluids.  If you have a headache not resolved with tylenol, right upper abdominal pain, or sudden onset of swelling.  You know your body best. Never hesitate to call or go to the hospital if something doesn't feel right!  Genetic Screening  Sampling of your blood to screen for genetic abnormalities, like Down's syndrome, in your baby  Done at 16-18 weeks gestation  Screening test only - further testing, like advanced ultrasound or amniocentesis, would be needed to confirm positive test!  Recommended for mothers over age 35, history of genetic abnormalities,   Talk to your doctor if you have further questions, or are interested in this screening test.   Breastfeeding  Breast feeding is best, for  "you and baby!   Recommendations are for exclusive breastfeeding for babies first 6 months of life.  Talk to your doctor if you have more questions about breastfeeding your baby.  Other Pregnancy Concerns  Continue to take a prenatal vitamin daily  Maintain an active, healthy lifestyle.   If this is your first baby, you can expect to start feeling movement at 20-24 weeks gestation. If this is your second or more pregnancy, you will generally start feeling movement at 18-22 weeks gestation.             Follow-ups after your visit        Follow-up notes from your care team     Return in about 1 month (around 8/6/2018) for OB.      Your next 10 appointments already scheduled     Aug 03, 2018 10:40 AM CDT   RETURN OB with Tamera Peter MD   [facility] ([facility])    [address] [place] [address]   [phone]              Who to contact     Please call your clinic at [phone] to:    Ask questions about your health    Make or cancel appointments    Discuss your medicines    Learn about your test results    Speak to your doctor            Additional Information About Your Visit        Care EveryWhere ID     This is your Care EveryWhere ID. This could be used by other organizations to access your [facility] medical records  [id number]        Your Vitals Were     Pulse Temperature Respirations Height Last Period Pulse Oximetry    68 97.8  F (36.6  C) (Oral) 18 4' 11.06\" (150 cm) 03/13/2018 (Approximate) 100%    BMI (Body Mass Index)                   25.16 kg/m2            Blood Pressure from Last 3 Encounters:   07/06/18 101/69   06/06/18 101/65   05/15/18 96/61    Weight from Last 3 Encounters:   07/06/18 124 lb 12.8 oz (56.6 kg)   06/06/18 124 lb (56.2 kg)   05/15/18 126 lb (57.2 kg)              We Performed the Following     Protein  random urine with Creat Ratio     Wet Prep ([facility])        Primary Care Provider Office Phone # Fax #    Lashawn Ojeda [phone] " [phone]       [facility] [address] [place] [address]        Equal Access to Services     [facility]IMALE AMBRIZ : Hadii aad ku hadfernyviola Mauderama, watavaresda luqadaha, qaybta kaalmada brunoshonamirna, sofia fitzgerald hayabiel pattersonadelalindy crabtree. So [facility] [phone].    ATENCIÓN: Si habla español, tiene a mcclellnad disposición servicios gratuitos de asistencia lingüística. Llame al [phone].    We comply with applicable federal civil rights laws and [place] laws. We do not discriminate on the basis of race, color, national origin, age, disability, sex, sexual orientation, or gender identity.            Thank you!     Thank you for choosing [facility]  for your care. Our goal is always to provide you with excellent care. Hearing back from our patients is one way we can continue to improve our services. Please take a few minutes to complete the written survey that you may receive in the mail after your visit with us. Thank you!             Your Updated Medication List - Protect others around you: Learn how to safely use, store and throw away your medicines at www.disposemymeds.org.          This list is accurate as of 7/6/18 11:59 PM.  Always use your most recent med list.                   Brand Name Dispense Instructions for use Diagnosis    metroNIDAZOLE 500 MG tablet    FLAGYL    14 tablet    Take 1 tablet (500 mg) by mouth 2 times daily    Bacterial vaginosis       prenatal multivitamin plus iron 27-0.8 MG Tabs per tablet     100 tablet    Take 1 tablet by mouth daily    Pregnant and not yet delivered in first trimester

## 2018-07-06 NOTE — NURSING NOTE
Due to patient being non-English speaking/uses sign language, an  was used for this visit. Only for face-to-face interpretation by an external agency, date and length of interpretation can be found on the scanned worksheet.     name: Paige Rondon  Agency: [employer]  Language: Hmong   Telephone number: [phone]  Type of interpretation: Face-to-face, spoken

## 2018-07-06 NOTE — PATIENT INSTRUCTIONS
[facility] Information  If you have any further concerns or wish to schedule another appointment, please call our office at [phone] during normal business hours (8-5, M-F).     For uncomplicated pregnancies, you will be seeing your doctor once a month until you are 28 weeks, then you will see your doctor twice a month. You will begin weekly visits at 36 weeks until you deliver.     If you have urgent medical questions that cannot wait, you may call [phone] at any time of day. Call your doctor if you experience any bleeding or abdominal cramping early in pregnancy.     If you have a medical emergency, please call [phone].    When to call or come in to the hospital  If you have any bleeding or leakage of fluids.   If you have uterine cramping that does not resolve with rest and fluids.  If you have a headache not resolved with tylenol, right upper abdominal pain, or sudden onset of swelling.  You know your body best. Never hesitate to call or go to the hospital if something doesn't feel right!  Genetic Screening  Sampling of your blood to screen for genetic abnormalities, like Down's syndrome, in your baby  Done at 16-18 weeks gestation  Screening test only - further testing, like advanced ultrasound or amniocentesis, would be needed to confirm positive test!  Recommended for mothers over age 35, history of genetic abnormalities,   Talk to your doctor if you have further questions, or are interested in this screening test.   Breastfeeding  Breast feeding is best, for you and baby!   Recommendations are for exclusive breastfeeding for babies first 6 months of life.  Talk to your doctor if you have more questions about breastfeeding your baby.  Other Pregnancy Concerns  Continue to take a prenatal vitamin daily  Maintain an active, healthy lifestyle.   If this is your first baby, you can expect to start feeling movement at 20-24 weeks gestation. If this is your second or more pregnancy, you will  generally start feeling movement at 18-22 weeks gestation.

## 2018-07-09 NOTE — PROGRESS NOTES
Preceptor Attestation:   Patient seen, evaluated and discussed with the resident. I have verified the content of the note, which accurately reflects my assessment of the patient and the plan of care.    Supervising Physician:Bert Mike MD    [facility]

## 2018-08-20 ENCOUNTER — OFFICE VISIT (OUTPATIENT)
Dept: FAMILY MEDICINE | Facility: CLINIC | Age: 28
End: 2018-08-20
Payer: COMMERCIAL

## 2018-08-20 VITALS
RESPIRATION RATE: 16 BRPM | HEIGHT: 59 IN | SYSTOLIC BLOOD PRESSURE: 98 MMHG | DIASTOLIC BLOOD PRESSURE: 65 MMHG | WEIGHT: 130 LBS | HEART RATE: 67 BPM | OXYGEN SATURATION: 99 % | BODY MASS INDEX: 26.21 KG/M2 | TEMPERATURE: 97.7 F

## 2018-08-20 DIAGNOSIS — Z34.92 PREGNANT AND NOT YET DELIVERED IN SECOND TRIMESTER: Primary | ICD-10-CM

## 2018-08-20 DIAGNOSIS — O12.12 GESTATIONAL PROTEINURIA IN SECOND TRIMESTER: ICD-10-CM

## 2018-08-20 LAB
CREAT UR-MCNC: 166.7 MG/DL
PROT UR-MCNC: 13 MG/DL
PROTEIN/CREAT RATIO, RANDOM UR: 0.08

## 2018-08-20 NOTE — MR AVS SNAPSHOT
"              After Visit Summary   8/20/2018    Sydney Dao    MRN: [id number]           Patient Information     Date Of Birth          1990        Visit Information        Provider Department      8/20/2018 9:40 AM Tamera Peter MD [facility]        Today's Diagnoses     Pregnant and not yet delivered in second trimester    -  1    Gestational proteinuria in second trimester           Follow-ups after your visit        Follow-up notes from your care team     Return in about 4 weeks (around 9/17/2018) for OB.      Your next 10 appointments already scheduled     Aug 30, 2018 10:40 AM CDT   ULTRASOUND with Lorelei Powellningham   [facility] ([facility])    [address] [place] [address]   [phone]           An adult must be present if children accompany the patient being seen.            Sep 19, 2018  2:20 PM CDT   RETURN OB with Tamera Peter MD   [facility] ([facility])    [address] [place] [address]   [phone]              Who to contact     Please call your clinic at [phone] to:    Ask questions about your health    Make or cancel appointments    Discuss your medicines    Learn about your test results    Speak to your doctor            Additional Information About Your Visit        Care EveryWhere ID     This is your Care EveryWhere ID. This could be used by other organizations to access your [place] medical records  [id number]        Your Vitals Were     Pulse Temperature Respirations Height Last Period Pulse Oximetry    67 97.7  F (36.5  C) (Oral) 16 4' 11.06\" (150 cm) 03/13/2018 (Approximate) 99%    BMI (Body Mass Index)                   26.21 kg/m2            Blood Pressure from Last 3 Encounters:   08/20/18 98/65   07/06/18 101/69   06/06/18 101/65    Weight from Last 3 Encounters:   08/20/18 130 lb (59 kg)   07/06/18 124 lb 12.8 oz (56.6 kg)   06/06/18 124 lb (56.2 kg)              We Performed the " Following     Prot/Creat Random Urine ([facility])      OB 14 +WKS SINGLE OR FIRST GESTATION        Primary Care Provider Office Phone # Fax #    Lashawn Ojeda [phone]       [facility] [address] [place] [address]        Equal Access to Services     KARENA AMBRIZ : Hadii aad ku hadasho Soomaali, waaxda luqadaha, qaybta kaalmada adeegyada, waxay idiin hayaan adeeg kharash la'aan ah. So [facility] [phone].    ATENCIÓN: Si habla español, tiene a mcclelland disposición servicios gratuitos de asistencia lingüística. [facility] [place] [phone].    We comply with applicable federal civil rights laws and [place] laws. We do not discriminate on the basis of race, color, national origin, age, disability, sex, sexual orientation, or gender identity.            Thank you!     Thank you for choosing [facility]  for your care. Our goal is always to provide you with excellent care. Hearing back from our patients is one way we can continue to improve our services. Please take a few minutes to complete the written survey that you may receive in the mail after your visit with us. Thank you!             Your Updated Medication List - Protect others around you: Learn how to safely use, store and throw away your medicines at www.disposemymeds.org.          This list is accurate as of 8/20/18 11:59 PM.  Always use your most recent med list.                   Brand Name Dispense Instructions for use Diagnosis    metroNIDAZOLE 500 MG tablet    FLAGYL    14 tablet    Take 1 tablet (500 mg) by mouth 2 times daily    Bacterial vaginosis       prenatal multivitamin plus iron 27-0.8 MG Tabs per tablet     100 tablet    Take 1 tablet by mouth daily    Pregnant and not yet delivered in first trimester

## 2018-08-20 NOTE — PROGRESS NOTES
"History   Sydney Dao is a 28 year old  female who presents to clinic for a follow up OB visit.   - 19w2d with ADALI 2019  - No headache, chest pain, shortness of breath, abdominal pain/contractions, vaginal bleeding, or abnormal discharge. has been feeling baby move every day.     - still having some nausea and vomiting but less, gaining weight now, and doesn't want med    A [employer]  was used for this visit.   Medical and social history and medications reviewed with patient.   Exam   BP 98/65  Pulse 67  Temp 97.7  F (36.5  C) (Oral)  Resp 16  Ht 4' 11.06\" (150 cm)  Wt 130 lb (59 kg)  LMP 2018 (Approximate)  SpO2 99%  BMI 26.21 kg/m2  General: NAD  Abdomen: gravid uterus appropriate for gestation age above pubic symphysis, non-tender, FHTs 150  Extremities: no edema  Medical Decision-Making     Patient Active Problem List   Diagnosis     Pregnant and not yet delivered in second trimester     Screening for cervical cancer     - Fetal anatomy ultrasound ordered, planned for 22 weeks.  - Discussed expectations of second trimester and when to call/come in  - urine/protein creatinine ordered to quantify protein for baseline    Follow up: 4 weeks for routine prenatal visit  Readdress: weight gain    Tamera Peter MD, MPH  [facility] Family Medicine Resident     Precepted patient with Price Cook MD    Options for treatment and follow-up care were reviewed with the patient and/or guardian. Sydney Dao and/or guardian engaged in the decision making process and verbalized understanding of the options discussed and agreed with the final plan.      Preceptor Attestation:  I saw and evaluated the patient.  I reviewed the resident physician's history, exam, and treatment plan; and I agree with the documentation by the resident physician.  Supervising Physician:  Price Cook MD    "

## 2018-08-20 NOTE — NURSING NOTE
Due to patient being non-English speaking/uses sign language, an  was used for this visit. Only for face-to-face interpretation by an external agency, date and length of interpretation can be found on the scanned worksheet.     name: Uzma Yuan  Agency: [employer]  Language: May   Telephone number: [phone]  Type of interpretation: Face-to-face, spoken

## 2018-08-30 DIAGNOSIS — Z34.92 PREGNANT AND NOT YET DELIVERED IN SECOND TRIMESTER: ICD-10-CM

## 2018-08-30 DIAGNOSIS — Z34.92 PREGNANT AND NOT YET DELIVERED IN SECOND TRIMESTER: Primary | ICD-10-CM

## 2018-08-30 NOTE — LETTER
September 6, 2018      Sydney Dao  [address] [place] [address]        Dear Sydney,    Your ultrasound results are back. It looks like baby is developing normally.     Take care, Dr. Peter

## 2018-08-30 NOTE — PROGRESS NOTES
Due to patient being non-English speaking/uses sign language, an  was used for this visit. Only for face-to-face interpretation by an external agency, date and length of interpretation can be found on the scanned worksheet.     name: Uzma Yuan  Agency: [employer]  Language: May   Telephone number: [phone]  Type of interpretation: Face-to-face, spoken

## 2018-10-18 ENCOUNTER — OFFICE VISIT (OUTPATIENT)
Dept: FAMILY MEDICINE | Facility: CLINIC | Age: 28
End: 2018-10-18
Payer: COMMERCIAL

## 2018-10-18 VITALS
BODY MASS INDEX: 27.01 KG/M2 | HEART RATE: 89 BPM | HEIGHT: 59 IN | SYSTOLIC BLOOD PRESSURE: 104 MMHG | OXYGEN SATURATION: 99 % | WEIGHT: 134 LBS | TEMPERATURE: 97.6 F | DIASTOLIC BLOOD PRESSURE: 69 MMHG

## 2018-10-18 DIAGNOSIS — Z34.90 NORMAL INTRAUTERINE PREGNANCY, ANTEPARTUM: Primary | ICD-10-CM

## 2018-10-18 DIAGNOSIS — Z23 IMMUNIZATION DUE: ICD-10-CM

## 2018-10-18 LAB
GLU GEST SCREEN 1HR 50G: 130 (ref 0–129)
HCT VFR BLD AUTO: 40.4 % (ref 35–47)
HEMOGLOBIN: 13.2 G/DL (ref 11.7–15.7)
MCH RBC QN AUTO: 30.9 PG (ref 26.5–35)
MCHC RBC AUTO-ENTMCNC: 32.7 G/DL (ref 32–36)
MCV RBC AUTO: 94.5 FL (ref 78–100)
PLATELET # BLD AUTO: 297 K/UL (ref 150–450)
RBC # BLD AUTO: 4.27 M/UL (ref 3.8–5.2)
WBC # BLD AUTO: 9.5 K/UL (ref 4–11)

## 2018-10-18 NOTE — PROGRESS NOTES
The patient is a 28 year old    presenting at 27w5d  for follow up OB.  She  denies any HA, CP, SOB, abd pain/contractions, vaginal bleeding or abnormal discharge. Baby has been moving normally.      Her new concerns today include: None    Exam: Cardiac: RRR no m/r/g  Lungs: CTAB no r/c/w  Abd: gravid uterus measuring 28 cm with  bpm, non-tender.  Neuro: reflexes normal symmetric at the patella.  A/P  1) Pregnancy-28 year old,  at 27w5d   2) 1 hr GTT today  3) syphilis and CBC  4) received TDAP and flu shot    Follow up in 4 weeks.     Dr. Sukh Newman    Precepted with: Hernan Hensley MD

## 2018-10-18 NOTE — MR AVS SNAPSHOT
"              After Visit Summary   10/18/2018    Sydney Dao    MRN: [id number]           Patient Information     Date Of Birth          1990        Visit Information        Provider Department      10/18/2018 3:20 PM Sukh Newman MD [facility]        Today's Diagnoses     Normal intrauterine pregnancy, antepartum    -  1    Immunization due           Follow-ups after your visit        Follow-up notes from your care team     Return in about 4 weeks (around 11/15/2018).      Who to contact     Please call your clinic at [phone] to:    Ask questions about your health    Make or cancel appointments    Discuss your medicines    Learn about your test results    Speak to your doctor            Additional Information About Your Visit        Care EveryWhere ID     This is your Care EveryWhere ID. This could be used by other organizations to access your [place] medical records  [id number]        Your Vitals Were     Pulse Temperature Height Last Period Pulse Oximetry BMI (Body Mass Index)    89 97.6  F (36.4  C) (Oral) 4' 10.66\" (149 cm) 03/13/2018 (Approximate) 99% 27.38 kg/m2       Blood Pressure from Last 3 Encounters:   10/18/18 104/69   08/20/18 98/65   07/06/18 101/69    Weight from Last 3 Encounters:   10/18/18 134 lb (60.8 kg)   08/20/18 130 lb (59 kg)   07/06/18 124 lb 12.8 oz (56.6 kg)              We Performed the Following     ADMIN VACCINE, EACH ADDITIONAL     ADMIN VACCINE, INITIAL     CBC with Plt ([facility])     FLU VAC PRESRV FREE QUAD SPLIT VIR IM, 0.5 mL dosage     Glucose Challenge  1 Hr  ([facility])     Syphilis Screen [place] (RPR/VDRL) ([facility])     TDAP VACCINE ([facility])        Primary Care Provider Office Phone # Fax #    Lashawn Ojeda [phone]       [facility] [address] [place] [address]        Equal Access to Services     ABIMAEL AMBRIZ AH: Hadii dorene ku hadasho Soomaali, waaxda luqadaha, qaybta kaalmada adeegyada, waxay idiin " lupe olenakristopher yeseniasam layasminjose leyda. So [facility] [phone].    ATENCIÓN: Si habla taniya, tiene a mcclelland disposición servicios gratuitos de asistencia lingüística. Charanjit al [phone].    We comply with applicable federal civil rights laws and [place] laws. We do not discriminate on the basis of race, color, national origin, age, disability, sex, sexual orientation, or gender identity.            Thank you!     Thank you for choosing [facility]  for your care. Our goal is always to provide you with excellent care. Hearing back from our patients is one way we can continue to improve our services. Please take a few minutes to complete the written survey that you may receive in the mail after your visit with us. Thank you!             Your Updated Medication List - Protect others around you: Learn how to safely use, store and throw away your medicines at www.disposemymeds.org.          This list is accurate as of 10/18/18 11:59 PM.  Always use your most recent med list.                   Brand Name Dispense Instructions for use Diagnosis    metroNIDAZOLE 500 MG tablet    FLAGYL    14 tablet    Take 1 tablet (500 mg) by mouth 2 times daily    Bacterial vaginosis       prenatal multivitamin plus iron 27-0.8 MG Tabs per tablet     100 tablet    Take 1 tablet by mouth daily    Pregnant and not yet delivered in first trimester

## 2018-10-18 NOTE — NURSING NOTE
"Chief Complaint   Patient presents with     Prenatal Care     Needs 1 hour GTT. Ok for Tdap and Flu     Medication Reconciliation     completed.        /69  Pulse 89  Temp 97.6  F (36.4  C) (Oral)  Ht 4' 10.66\" (149 cm)  Wt 134 lb (60.8 kg)  LMP 03/13/2018 (Approximate)  SpO2 99%  BMI 27.38 kg/m2        Due to patient being non-English speaking/uses sign language, an  was used for this visit. Only for face-to-face interpretation by an external agency, date and length of interpretation can be found on the scanned worksheet.       name: Uzma Yuan  Language: May  Agency: [employer]  Phone number: [phone]  Type of interpretation: Face to Face spoken      "

## 2018-10-18 NOTE — PROGRESS NOTES
Preceptor Attestation:   Patient seen, evaluated and discussed with the resident. I have verified the content of the note, which accurately reflects my assessment of the patient and the plan of care.  Supervising Physician:Hernan Hensley MD  [facility]

## 2018-10-19 LAB — TREPONEMA ANTIBODY (SYPHILIS): NEGATIVE

## 2018-10-22 NOTE — PROGRESS NOTES
Sydney or Maryan,  Please call the patient and give them my message below.   Please also help her schedule a follow up visit for a 3 hr GTT  Sydney Padilla  Your results from your recent clinic visit show that your syphilis test was normal and your hemoglobin was normal. However, you did not pass the glucose tolerence test which means you may be at risk for diabetes during your pregnancy. This could complicate your pregnancy. Please schedule a Follow-up visit with me or another provider at [facility] to have a 3 hr glucose test. I understand that with your busy schedule this may be difficult but it is very important for the wellness of your pregnancy that you do this.     If you have more questions please let my PCS know  and tell her a number I can reach you at and I will call you back personally.    Dr. Sukh Newman

## 2018-10-22 NOTE — PROGRESS NOTES
Called and inform pt regarding results and recommendation. Pt states understanding and schedule HERMINIO with Dr. Peter on 11/13/2018 @ her request and will have 3 hr GTT done then.

## 2018-11-13 ENCOUNTER — OFFICE VISIT (OUTPATIENT)
Dept: FAMILY MEDICINE | Facility: CLINIC | Age: 28
End: 2018-11-13
Payer: COMMERCIAL

## 2018-11-13 VITALS
SYSTOLIC BLOOD PRESSURE: 95 MMHG | OXYGEN SATURATION: 97 % | HEIGHT: 58 IN | BODY MASS INDEX: 27.5 KG/M2 | WEIGHT: 131 LBS | TEMPERATURE: 98.1 F | HEART RATE: 76 BPM | RESPIRATION RATE: 20 BRPM | DIASTOLIC BLOOD PRESSURE: 57 MMHG

## 2018-11-13 DIAGNOSIS — O35.BXX0 ECHOGENIC FOCUS OF HEART OF FETUS AFFECTING ANTEPARTUM CARE OF MOTHER, SINGLE OR UNSPECIFIED FETUS: ICD-10-CM

## 2018-11-13 DIAGNOSIS — Z34.92 PREGNANT AND NOT YET DELIVERED IN SECOND TRIMESTER: Primary | ICD-10-CM

## 2018-11-13 DIAGNOSIS — Z34.90 NORMAL INTRAUTERINE PREGNANCY, ANTEPARTUM: ICD-10-CM

## 2018-11-13 LAB
GLU, 1 HOUR, 100 G: 111 MG/DL
GLU, 2 HOUR, 100 G: 98 MG/DL
GLU, 3 HOUR, 100 G: 89 MG/DL
GLUCOSE P FAST SERPL-MCNC: 70 MG/DL

## 2018-11-13 NOTE — PROGRESS NOTES
"History   Sydney Dao is a 28 year old  female who presents to clinic for a follow up OB visit.   - 31w3d with ADALI 2019  - No headache, chest pain, shortness of breath, abdominal pain/contractions, vaginal bleeding, or abnormal discharge. has been feeling baby move every day.   - lost weight. No exercise. No vomiting. No change in diet. Not trying to lose weight.  - does not want more children. Used IUD in the past but caused abdominal cramping - didn't like this.     A [employer]  was used for this visit. [employer] was used  Medical and social history and medications reviewed with patient.   Exam   BP 95/57  Pulse 76  Temp 98.1  F (36.7  C)  Resp 20  Ht 4' 10\" (147.3 cm)  Wt 131 lb (59.4 kg)  LMP 2018 (Approximate)  SpO2 97%  BMI 27.38 kg/m2  General: NAD  See flowsheet for abdominal, gyn, extremities, neuro exams  Medical Decision-Making     Patient Active Problem List   Diagnosis     Pregnant and not yet delivered in second trimester     Screening for cervical cancer     - discussed PIH symptoms  -  consent not needed  - info given on healthy fats; nuts, avocados  - US ordered (echogenic focus on anatomy scan)    Labor/postpartum planning  - Circumcision: undecided. Information given today. Risks/benefits discussed - she will talk with  and decide. Has girls at home.   - Contraception: abstinence. Will consider nexplanon.     Follow up: 2 weeks for routine prenatal visit  Readdress: weight gain; 3 hr GTT; US for growth and echogenic focus    Tamera Peter MD, MPH  [facility] Family Medicine Resident     Precepted patient with Su Swanson MD    Options for treatment and follow-up care were reviewed with the patient and/or guardian. Sydney Dao and/or guardian engaged in the decision making process and verbalized understanding of the options discussed and agreed with the final plan.    "

## 2018-11-13 NOTE — PATIENT INSTRUCTIONS
" Healthy Weight Maintenance/Gain:    The preferred weight gain approach involves healthy weight maintenance with eventual gain, and the discouraged approach is rapidly building unhealthy body fat stores. This means, instead of loading up on junk foods and high-fat nutritional nightmares like fast food sandwiches, cookies and candies, treat yourself to nourishing foods that will help you add muscle and body mass. High-fat diets are not healthy and may increase blood cholesterol. Cholesterol is a type of fat that is found in your blood, and high blood cholesterol levels can lead to dangerous artery clogging heart disease.     Healthy weight gain goal is about 1/2 to 1 pound each week. Gaining weight slowly will help to avoid gaining too much body fat. The amount of weight that you will gain depends on the amount of calories you eat and drink. It also depends on your exercise program. An exercise program that includes strength training will help you to gain muscle weight instead of fat.    Appetite: Some people have a small appetite, which makes it difficult to increase portion sizes. Try to eat 5-7 small meals and snacks to help increase calories and not feel \"over-stuffed.\" It is important to not skip meals or snacks.     Snacking: Carry healthy snacks with you. If you are not able to fit regular meals into your schedule, eat and drink heatlhy snacks and/or protein supplements throughout the day. Try to eat extra snacks during the day when you do not normally have a snack. Ideas for healthy snacks include:      Fruit yogurt with whole grain granola cereal    9-12 Almonds or 15-20 Peanuts    1/2 English muffin with natural peanut butter    1 Mini multigrain bagel with cream cheese and low sugar jelly    4-6 Avocado slices with 1-2 ounces of cheese    6-8 Whole grain crackers with 1 ounce of cheese    2-3 Betito crackers with peanut butter    Liquids: Caloric beverages can be a useful means to add extra calories and " optimize fluid intake. However, caloric beverages are often highly concentrated with simple sugars.      [facility] Information  If you have any further concerns or wish to schedule another appointment, please call our office at [phone] during normal business hours (8-5, M-F).     For uncomplicated pregnancies, you will be seeing your doctor once a month until you are 28 weeks, then you will see your doctor twice a month. You will begin weekly visits at 36 weeks until you deliver.     If you have urgent medical questions that cannot wait, you may call [phone] at any time of day.     If you have a medical emergency, please call 485.    When to call or come in to the hospital  If you notice a decrease in your baby's movement.   If your begin to experience contractions that are 5 minutes or less apart and lasting for over 45 seconds, or if contractions are becoming more painful.  If you have any bleeding or leakage of fluids.   If you have a headache not resolved with tylenol, right upper abdominal pain, or sudden onset of swelling.  You know your body best. Never hesitate to call or go to the hospital if something doesn't feel right!    Preparing for the hospital  You re likely feeling anxious as your child s birth approaches. This is normal. To give yourself some peace of mind, pack a bag 3-4 weeks before your due date. Here is a list of things to remember:  Personal care items like toothbrush, hair brush, lip balm, lotion, shampoo, glasses, contacts  Nightgown, bathrobe, slippers  Several pairs of underwear  Comfortable clothes for you to wear home  Camera with new batteries  Cell phone and   Insurance information and any other paperwork needed for your hospital stay  Clothes for baby to wear home  An infant, rear-facing car seat for bringing home your baby (this is required by law)    Controlling Back Pain  As your body changes during pregnancy, your back must work in new ways. Back pain  is due to many causes. Physical changes in your body can strain your back and its supporting muscles. Also, hormones (chemicals that carry messages throughout the body) increase during pregnancy. This can affect how the muscles and joints work together. All of these changes can lead to pain in the upper or lower back or pelvis. Some pregnant women have sciatica, pain caused by pressure on the sciatic nerve running down the back of the leg. Ask your healthcare provider for specific tips and exercises to help control your back pain.    Tips to Help You Rest  Good rest and sleep will help you feel better. Here are some ideas:  Ask your partner to massage your shoulders, neck, or back.  Limit the errands you do each day.  Lie down in the afternoon or after work for a few minutes.  Take a warm bath before you go to sleep.  Drink warm milk or teas without caffeine.  Avoid coffee, black tea, and cola.    Preventing Heartburn  Avoid spicy or acidic foods.   Eat small amounts more often.  Wait 2 hours after eating before lying down   Sleep with your upper body raised 6 inches.  May use Tums as needed. Talk to your doctor about other medications to try.

## 2018-11-13 NOTE — NURSING NOTE
Due to patient being non-English speaking/uses sign language, an  was used for this visit. Only for face-to-face interpretation by an external agency, date and length of interpretation can be found on the scanned worksheet.     name: [id number] (Dev)  Agency: [employer]  Language: Hmong   Telephone number: ?  Type of interpretation: Telemedicine, spoken

## 2018-11-13 NOTE — LETTER
November 14, 2018      Sydney Dao  [address] [place] [address]        Dear Sydney,    Your 3 hour glucose test was normal, so you do not have gestational hypertension.     Please see below for your test results.    Resulted Orders   Glucose Tolerance 100 GM 3 HR ([facility])   Result Value Ref Range    Glucose Fasting 100 Grams 70 <95 mg/dL mg/dl    Glucose 1 Hour 100 Grams 111 <180 mg/dL mg/dl    Glucose 2 Hour 100 Grams 98 <155 mg/dL mg/dl    Glucose 3 Hour 100 Grams 89 <140 mg/dL mg/dl       If you have any questions, please call the clinic to make an appointment.    Sincerely,    Tamera Peter MD

## 2018-11-13 NOTE — MR AVS SNAPSHOT
"              After Visit Summary   11/13/2018    Sydney Dao    MRN: [id number]           Patient Information     Date Of Birth          1990        Visit Information        Provider Department      11/13/2018 8:20 AM Tamera Peter MD [facility]        Today's Diagnoses     Pregnant and not yet delivered in second trimester    -  1    Normal intrauterine pregnancy, antepartum        Echogenic focus of heart of fetus affecting antepartum care of mother, single or unspecified fetus          Care Instructions     Healthy Weight Maintenance/Gain:    The preferred weight gain approach involves healthy weight maintenance with eventual gain, and the discouraged approach is rapidly building unhealthy body fat stores. This means, instead of loading up on junk foods and high-fat nutritional nightmares like fast food sandwiches, cookies and candies, treat yourself to nourishing foods that will help you add muscle and body mass. High-fat diets are not healthy and may increase blood cholesterol. Cholesterol is a type of fat that is found in your blood, and high blood cholesterol levels can lead to dangerous artery clogging heart disease.     Healthy weight gain goal is about 1/2 to 1 pound each week. Gaining weight slowly will help to avoid gaining too much body fat. The amount of weight that you will gain depends on the amount of calories you eat and drink. It also depends on your exercise program. An exercise program that includes strength training will help you to gain muscle weight instead of fat.    Appetite: Some people have a small appetite, which makes it difficult to increase portion sizes. Try to eat 5-7 small meals and snacks to help increase calories and not feel \"over-stuffed.\" It is important to not skip meals or snacks.     Snacking: Carry healthy snacks with you. If you are not able to fit regular meals into your schedule, eat and drink heatlhy snacks and/or protein supplements throughout " the day. Try to eat extra snacks during the day when you do not normally have a snack. Ideas for healthy snacks include:      Fruit yogurt with whole grain granola cereal    9-12 Almonds or 15-20 Peanuts    1/2 English muffin with natural peanut butter    1 Mini multigrain bagel with cream cheese and low sugar jelly    4-6 Avocado slices with 1-2 ounces of cheese    6-8 Whole grain crackers with 1 ounce of cheese    2-3 Betito crackers with peanut butter    Liquids: Caloric beverages can be a useful means to add extra calories and optimize fluid intake. However, caloric beverages are often highly concentrated with simple sugars.      [facility] Information  If you have any further concerns or wish to schedule another appointment, please call our office at [phone] during normal business hours (8-5, M-F).     For uncomplicated pregnancies, you will be seeing your doctor once a month until you are 28 weeks, then you will see your doctor twice a month. You will begin weekly visits at 36 weeks until you deliver.     If you have urgent medical questions that cannot wait, you may call [phone] at any time of day.     If you have a medical emergency, please call [phone].    When to call or come in to the hospital  If you notice a decrease in your baby's movement.   If your begin to experience contractions that are 5 minutes or less apart and lasting for over 45 seconds, or if contractions are becoming more painful.  If you have any bleeding or leakage of fluids.   If you have a headache not resolved with tylenol, right upper abdominal pain, or sudden onset of swelling.  You know your body best. Never hesitate to call or go to the hospital if something doesn't feel right!    Preparing for the hospital  You re likely feeling anxious as your child s birth approaches. This is normal. To give yourself some peace of mind, pack a bag 3-4 weeks before your due date. Here is a list of things to remember:  Personal  care items like toothbrush, hair brush, lip balm, lotion, shampoo, glasses, contacts  Nightgown, bathrobe, slippers  Several pairs of underwear  Comfortable clothes for you to wear home  Camera with new batteries  Cell phone and   Insurance information and any other paperwork needed for your hospital stay  Clothes for baby to wear home  An infant, rear-facing car seat for bringing home your baby (this is required by law)    Controlling Back Pain  As your body changes during pregnancy, your back must work in new ways. Back pain is due to many causes. Physical changes in your body can strain your back and its supporting muscles. Also, hormones (chemicals that carry messages throughout the body) increase during pregnancy. This can affect how the muscles and joints work together. All of these changes can lead to pain in the upper or lower back or pelvis. Some pregnant women have sciatica, pain caused by pressure on the sciatic nerve running down the back of the leg. Ask your healthcare provider for specific tips and exercises to help control your back pain.    Tips to Help You Rest  Good rest and sleep will help you feel better. Here are some ideas:  Ask your partner to massage your shoulders, neck, or back.  Limit the errands you do each day.  Lie down in the afternoon or after work for a few minutes.  Take a warm bath before you go to sleep.  Drink warm milk or teas without caffeine.  Avoid coffee, black tea, and cola.    Preventing Heartburn  Avoid spicy or acidic foods.   Eat small amounts more often.  Wait 2 hours after eating before lying down   Sleep with your upper body raised 6 inches.  May use Tums as needed. Talk to your doctor about other medications to try.               Follow-ups after your visit        Follow-up notes from your care team     Return in about 2 weeks (around 11/27/2018) for OB.      Your next 10 appointments already scheduled     Nov 28, 2018  1:20 PM CST   RETURN OB with Tamera  "Lashawn Peter MD   [facility] ([facility])    [address] [place] [address]   [phone]              Future tests that were ordered for you today     Open Future Orders        Priority Expected Expires Ordered    US OB >14 Weeks Follow Up Routine  11/13/2019 11/13/2018            Who to contact     Please call your clinic at [phone] to:    Ask questions about your health    Make or cancel appointments    Discuss your medicines    Learn about your test results    Speak to your doctor            Additional Information About Your Visit        Care EveryWhere ID     This is your Care EveryWhere ID. This could be used by other organizations to access your [facility] medical records  [id number]        Your Vitals Were     Pulse Temperature Respirations Height Last Period Pulse Oximetry    76 98.1  F (36.7  C) 20 4' 10\" (147.3 cm) 03/13/2018 (Approximate) 97%    BMI (Body Mass Index)                   27.38 kg/m2            Blood Pressure from Last 3 Encounters:   11/13/18 95/57   10/18/18 104/69   08/20/18 98/65    Weight from Last 3 Encounters:   11/13/18 131 lb (59.4 kg)   10/18/18 134 lb (60.8 kg)   08/20/18 130 lb (59 kg)              We Performed the Following     Glucose Tolerance 100 GM 3 HR (LabDAQ)        Primary Care Provider Office Phone # Fax #    Lashawn Lily Ojeda [phone]       [facility] [address] [place] [address]        Equal Access to Services     [facility] : Hadii aad ku hadasho Soomaali, waaxda luqadaha, qaybta kaalmada adeegyada, wax amilcar cadrenas'abiel . So [facility] [phone].    ATENCIÓN: Si habla español, tiene a mcclelland disposición servicios gratuitos de asistencia lingüística. Llame al [phone].    We comply with applicable federal civil rights laws and [place] laws. We do not discriminate on the basis of race, color, national origin, age, disability, sex, sexual orientation, or gender " identity.            Thank you!     Thank you for choosing [facility]  for your care. Our goal is always to provide you with excellent care. Hearing back from our patients is one way we can continue to improve our services. Please take a few minutes to complete the written survey that you may receive in the mail after your visit with us. Thank you!             Your Updated Medication List - Protect others around you: Learn how to safely use, store and throw away your medicines at www.disposemymeds.org.          This list is accurate as of 11/13/18  9:14 AM.  Always use your most recent med list.                   Brand Name Dispense Instructions for use Diagnosis    metroNIDAZOLE 500 MG tablet    FLAGYL    14 tablet    Take 1 tablet (500 mg) by mouth 2 times daily    Bacterial vaginosis       prenatal multivitamin plus iron 27-0.8 MG Tabs per tablet     100 tablet    Take 1 tablet by mouth daily    Pregnant and not yet delivered in first trimester

## 2018-11-28 ENCOUNTER — OFFICE VISIT (OUTPATIENT)
Dept: FAMILY MEDICINE | Facility: CLINIC | Age: 28
End: 2018-11-28
Payer: COMMERCIAL

## 2018-11-28 VITALS
TEMPERATURE: 98.1 F | RESPIRATION RATE: 18 BRPM | DIASTOLIC BLOOD PRESSURE: 67 MMHG | HEIGHT: 58 IN | OXYGEN SATURATION: 98 % | HEART RATE: 91 BPM | SYSTOLIC BLOOD PRESSURE: 108 MMHG | BODY MASS INDEX: 28.13 KG/M2 | WEIGHT: 134 LBS

## 2018-11-28 DIAGNOSIS — Z34.92 PREGNANT AND NOT YET DELIVERED IN SECOND TRIMESTER: Primary | ICD-10-CM

## 2018-11-28 NOTE — MR AVS SNAPSHOT
After Visit Summary   11/28/2018    Sydney Dao    MRN: [id number]           Patient Information     Date Of Birth          1990        Visit Information        Provider Department      11/28/2018 1:20 PM Tamera Peter MD [facility]        Today's Diagnoses     Pregnant and not yet delivered in second trimester    -  1      Care Instructions    [facility] Information  If you have any further concerns or wish to schedule another appointment, please call our office at [phone] during normal business hours (8-5, M-F).     For uncomplicated pregnancies, you will be seeing your doctor once a month until you are 28 weeks, then you will see your doctor twice a month. You will begin weekly visits at 36 weeks until you deliver.     If you have urgent medical questions that cannot wait, you may call [phone] at any time of day.     If you have a medical emergency, please call [phone].    When to call or come in to the hospital  If you notice a decrease in your baby's movement.   If your begin to experience contractions that are 5 minutes or less apart and lasting for over 45 seconds, or if contractions are becoming more painful.  If you have any bleeding or leakage of fluids.   If you have a headache not resolved with tylenol, right upper abdominal pain, or sudden onset of swelling.  You know your body best. Never hesitate to call or go to the hospital if something doesn't feel right!    Preparing for the hospital  You re likely feeling anxious as your child s birth approaches. This is normal. To give yourself some peace of mind, pack a bag 3-4 weeks before your due date. Here is a list of things to remember:  Personal care items like toothbrush, hair brush, lip balm, lotion, shampoo, glasses, contacts  Nightgown, bathrobe, slippers  Several pairs of underwear  Comfortable clothes for you to wear home  Camera with new batteries  Cell phone and   Insurance information  and any other paperwork needed for your hospital stay  Clothes for baby to wear home  An infant, rear-facing car seat for bringing home your baby (this is required by law)  Managing Labor Pain   There are many ways to manage pain during labor. It can often be done with no anesthesia or strong pain medications. Talk to your health care provider about any options you would like to explore.   Help from Relaxation  Some of these are learned in special classes. Your health care provider can help you find classes. The hospital has a tub you can use during early labor. These methods may be of help to you:   Breathing techniques   A warm tub between contractions   Massage and therapeutic touch by your support person or labor    Reading materials that are comforting or inspiring   Music that is soothing   Hypnosis   Acupuncture and acupressure   Heat and cold applicatio  Help From Analgesics   Analgesics are mild medications that reduce pain. They can be used along with some relaxation methods. They can give you pain relief without total loss of feeling. They may lessen the pain of strong contractions. You may feel well enough to nap between contractions. They have little effect on your baby if given early in labor. This may be done by injection or by IV.   Help From Anesthetics   Anesthesia involves blockage of all feeling including pain. It can be given in the form of local anesthesia or general anesthesia.  Anesthesia is a type of medication to prevent pain. It is often used in labor. It may numb only one region of your body. This is called regional anesthesia. Or it may let you sleep during surgery. This is called general anesthesia. This type of medication is given by a trained specialist. When possible, regional anesthesia will be used. This is so you can be awake during your baby s birth.   Regional Anesthesia   Regional anesthesia may be used to numb your lower body for a vaginal or  birth. It does not  go into your bloodstream. This means that little or none of it will reach your baby. There are two kinds:   Epidural. This is most often given while you sit up or lie on your side. A needle with a flexible tube (catheter) is put in your lower back. The needle is then removed. The anesthetic is sent through the catheter. A pump may be attached. This gives you a constant level of anesthetic. An epidural often only partly affects muscle control. This means you should still be able to push for a vaginal birth.   Spinal. This is most often given in one dose right before delivery. It acts fast. You may sit up or lie down when it is injected. It may affect muscle control in your lower body. This includes the ability to push.    General Anesthesia   General anesthesia lets you sleep and keeps you free of pain during surgery. It may be used for a . It may be given as an injection. It may be given as an inhaled gas. Or it may be given as both. Delivery often occurs before the medication has reached the baby.              Follow-ups after your visit        Follow-up notes from your care team     Return in about 2 weeks (around 2018) for OB.      Your next 10 appointments already scheduled     2018  1:00 PM CST   ULTRASOUND with Lorelei Elizondo   [facility] ([facility])    [address] [place] [address]   [phone]           An adult must be present if children accompany the patient being seen.            Dec 19, 2018  8:20 AM CST   RETURN OB with Tamera Peter MD   [facility] ([facility])    [address] [place] [address] [facility] [address]   [phone]              Who to contact     Please call your clinic at [phone] to:    Ask questions about your health    Make or cancel appointments    Discuss your medicines    Learn about your test results    Speak to your doctor            Additional Information About Your Visit        Care EveryWhere ID   "   This is your Care EveryWhere ID. This could be used by other organizations to access your [place] medical records  [id number]        Your Vitals Were     Pulse Temperature Respirations Height Last Period Pulse Oximetry    91 98.1  F (36.7  C) 18 4' 10\" (147.3 cm) 03/13/2018 (Approximate) 98%    BMI (Body Mass Index)                   28.01 kg/m2            Blood Pressure from Last 3 Encounters:   11/28/18 108/67   11/13/18 95/57   10/18/18 104/69    Weight from Last 3 Encounters:   11/28/18 134 lb (60.8 kg)   11/13/18 131 lb (59.4 kg)   10/18/18 134 lb (60.8 kg)              Today, you had the following     No orders found for display       Primary Care Provider Office Phone # Fax #    Lashawn Ojeda [phone]       [facility] [place] [address]        Equal Access to Services     ABIMAEL AMBRIZ AH: Hadii dorene ku hadasho Soomaali, waaxda luqadaha, qaybta kaalmada adeegyada, waxay nicollein hayrichardn bruno العراقي . So [facility] [phone].    ATENCIÓN: Si habla español, tiene a mcclelland disposición servicios gratuitos de asistencia lingüística. Llame al [phone].    We comply with applicable federal civil rights laws and [place] laws. We do not discriminate on the basis of race, color, national origin, age, disability, sex, sexual orientation, or gender identity.            Thank you!     Thank you for choosing [facility]  for your care. Our goal is always to provide you with excellent care. Hearing back from our patients is one way we can continue to improve our services. Please take a few minutes to complete the written survey that you may receive in the mail after your visit with us. Thank you!             Your Updated Medication List - Protect others around you: Learn how to safely use, store and throw away your medicines at www.disposemymeds.org.          This list is accurate as of 11/28/18  2:05 PM.  Always use your most recent med list.                   " Brand Name Dispense Instructions for use Diagnosis    metroNIDAZOLE 500 MG tablet    FLAGYL    14 tablet    Take 1 tablet (500 mg) by mouth 2 times daily    Bacterial vaginosis       prenatal multivitamin w/iron 27-0.8 MG tablet     100 tablet    Take 1 tablet by mouth daily    Pregnant and not yet delivered in first trimester

## 2018-11-28 NOTE — PROGRESS NOTES
"History   Sydney Dao is a 28 year old  female who presents to clinic for a follow up OB visit.   - 33w4d with ADALI 2019  - No headache, chest pain, shortness of breath, abdominal pain/contractions, vaginal bleeding, or abnormal discharge. has been feeling baby move every day.   - wants more kids -  doesn't.   -  for 6 months with first baby but only 1 month with second baby due to wanting to bottle train at an earlier age. Did pump and had too much milk so was throwing it in the toilet and giving baby formula.     [employer]  was used for this visit.   Medical and social history and medications reviewed with patient.   Exam   /67  Pulse 91  Temp 98.1  F (36.7  C)  Resp 18  Ht 4' 10\" (147.3 cm)  LMP 2018 (Approximate)  SpO2 98%  BMI 27.38 kg/m2  General: NAD  See flowsheet for abdominal, gyn, extremities, neuro exams  Medical Decision-Making     Patient Active Problem List   Diagnosis     Pregnant and not yet delivered in second trimester     Screening for cervical cancer     - discussed PIH symptoms  - discussed benefits of breastfeeding and pumping and giving baby breastmilk. Can freeze and store, or can donate if excess.   - poor weight gain, but no longer losing. Has US for growth and follow up echogenic focus tomorrow    Labor/postpartum planning  - Prior delivery complications: none  - Labor: no pain meds  - Breastfeeding: yes  - Circumcision: no  - Contraception: does not desire but wants to wait a while before thinking about more kids  - Pediatrician: this clinic, with me    Follow up: 2 weeks for routine prenatal visit  Readdress: gbs, weight, breastfeeding, contraception    Tamera Peter MD, MPH  [facility] Resident     Precepted patient with Hernan Hensley MD    Options for treatment and follow-up care were reviewed with the patient and/or guardian. Sydney Dao and/or guardian engaged in the decision making process and " verbalized understanding of the options discussed and agreed with the final plan.

## 2018-11-28 NOTE — PATIENT INSTRUCTIONS
[facility] Information  If you have any further concerns or wish to schedule another appointment, please call our office at [phone] during normal business hours (8-5, M-F).     For uncomplicated pregnancies, you will be seeing your doctor once a month until you are 28 weeks, then you will see your doctor twice a month. You will begin weekly visits at 36 weeks until you deliver.     If you have urgent medical questions that cannot wait, you may call [phone] at any time of day.     If you have a medical emergency, please call [phone].    When to call or come in to the hospital  If you notice a decrease in your baby's movement.   If your begin to experience contractions that are 5 minutes or less apart and lasting for over 45 seconds, or if contractions are becoming more painful.  If you have any bleeding or leakage of fluids.   If you have a headache not resolved with tylenol, right upper abdominal pain, or sudden onset of swelling.  You know your body best. Never hesitate to call or go to the hospital if something doesn't feel right!    Preparing for the hospital  You re likely feeling anxious as your child s birth approaches. This is normal. To give yourself some peace of mind, pack a bag 3-4 weeks before your due date. Here is a list of things to remember:  Personal care items like toothbrush, hair brush, lip balm, lotion, shampoo, glasses, contacts  Nightgown, bathrobe, slippers  Several pairs of underwear  Comfortable clothes for you to wear home  Camera with new batteries  Cell phone and   Insurance information and any other paperwork needed for your hospital stay  Clothes for baby to wear home  An infant, rear-facing car seat for bringing home your baby (this is required by law)  Managing Labor Pain   There are many ways to manage pain during labor. It can often be done with no anesthesia or strong pain medications. Talk to your health care provider about any options you would like to  explore.   Help from Relaxation  Some of these are learned in special classes. Your health care provider can help you find classes. The hospital has a tub you can use during early labor. These methods may be of help to you:   Breathing techniques   A warm tub between contractions   Massage and therapeutic touch by your support person or labor    Reading materials that are comforting or inspiring   Music that is soothing   Hypnosis   Acupuncture and acupressure   Heat and cold applicatio  Help From Analgesics   Analgesics are mild medications that reduce pain. They can be used along with some relaxation methods. They can give you pain relief without total loss of feeling. They may lessen the pain of strong contractions. You may feel well enough to nap between contractions. They have little effect on your baby if given early in labor. This may be done by injection or by IV.   Help From Anesthetics   Anesthesia involves blockage of all feeling including pain. It can be given in the form of local anesthesia or general anesthesia.  Anesthesia is a type of medication to prevent pain. It is often used in labor. It may numb only one region of your body. This is called regional anesthesia. Or it may let you sleep during surgery. This is called general anesthesia. This type of medication is given by a trained specialist. When possible, regional anesthesia will be used. This is so you can be awake during your baby s birth.   Regional Anesthesia   Regional anesthesia may be used to numb your lower body for a vaginal or  birth. It does not go into your bloodstream. This means that little or none of it will reach your baby. There are two kinds:   Epidural. This is most often given while you sit up or lie on your side. A needle with a flexible tube (catheter) is put in your lower back. The needle is then removed. The anesthetic is sent through the catheter. A pump may be attached. This gives you a constant level of  anesthetic. An epidural often only partly affects muscle control. This means you should still be able to push for a vaginal birth.   Spinal. This is most often given in one dose right before delivery. It acts fast. You may sit up or lie down when it is injected. It may affect muscle control in your lower body. This includes the ability to push.    General Anesthesia   General anesthesia lets you sleep and keeps you free of pain during surgery. It may be used for a . It may be given as an injection. It may be given as an inhaled gas. Or it may be given as both. Delivery often occurs before the medication has reached the baby.

## 2018-11-28 NOTE — NURSING NOTE
Due to patient being non-English speaking/uses sign language, an  was used for this visit. Only for face-to-face interpretation by an external agency, date and length of interpretation can be found on the scanned worksheet.     name: Uzma Yuan  Agency: [employer]  Language: May   Telephone number: [phone]   Type of interpretation: Face-to-face, spoken

## 2018-11-29 DIAGNOSIS — O28.3 FETAL ECHOGENIC INTRACARDIAC FOCUS ON PRENATAL ULTRASOUND: Primary | ICD-10-CM

## 2018-11-29 DIAGNOSIS — O28.3 FETAL ECHOGENIC INTRACARDIAC FOCUS ON PRENATAL ULTRASOUND: ICD-10-CM

## 2018-11-29 NOTE — LETTER
December 3, 2018      Sydney Dao  [address] [place] [address]        Dear Sydney,    Your ultrasound looked good. They did not see the abnormality on the heart anymore, so we don't need to be concerned about baby's heart. Baby was measuring a little smaller than the last ultrasound, but still within normal growth. Keep trying to add healthy high fat snacks to your diet, like whole milk, cheese, and nuts.    Dr. Peter

## 2018-11-29 NOTE — PROGRESS NOTES
Due to patient being non-English speaking/uses sign language, an  was used for this visit. Only for face-to-face interpretation by an external agency, date and length of interpretation can be found on the scanned worksheet.     name: Paige Snyder  Agency: [employer]  Language: May   Telephone number: [phone]  Type of interpretation: Face-to-face, spoken

## 2018-12-19 ENCOUNTER — OFFICE VISIT (OUTPATIENT)
Dept: FAMILY MEDICINE | Facility: CLINIC | Age: 28
End: 2018-12-19
Payer: COMMERCIAL

## 2018-12-19 VITALS
RESPIRATION RATE: 18 BRPM | BODY MASS INDEX: 27.38 KG/M2 | OXYGEN SATURATION: 98 % | WEIGHT: 135.8 LBS | HEART RATE: 79 BPM | HEIGHT: 59 IN | DIASTOLIC BLOOD PRESSURE: 62 MMHG | TEMPERATURE: 98.1 F | SYSTOLIC BLOOD PRESSURE: 99 MMHG

## 2018-12-19 DIAGNOSIS — Z34.92 PREGNANT AND NOT YET DELIVERED IN SECOND TRIMESTER: Primary | ICD-10-CM

## 2018-12-19 ASSESSMENT — MIFFLIN-ST. JEOR: SCORE: 1248.73

## 2018-12-19 NOTE — NURSING NOTE
Due to patient being non-English speaking/uses sign language, an  was used for this visit. Only for face-to-face interpretation by an external agency, date and length of interpretation can be found on the scanned worksheet.     name: kaleigh [id number]  Agency: [employer] ([employer] only)  Language: Lexyong   Telephone number: [phone]  Type of interpretation: Telephone, spoken

## 2018-12-19 NOTE — PATIENT INSTRUCTIONS
[facility] Information  If you have any further concerns or wish to schedule another appointment, please call our office at [phone] during normal business hours (8-5, M-F).     For uncomplicated pregnancies, you will be seeing your doctor once a month until you are 28 weeks, then you will see your doctor twice a month. You will begin weekly visits at 36 weeks until you deliver.     If you have urgent medical questions that cannot wait, you may call [phone] at any time of day.     If you have a medical emergency, please call [phone].    When to call or come in to the hospital  If you notice a decrease in your baby's movement.   If your begin to experience contractions that are 5 minutes or less apart and lasting for over 45 seconds, or if contractions are becoming more painful.  If you have any bleeding or leakage of fluids.   If you have a headache not resolved with tylenol, right upper abdominal pain, or sudden onset of swelling.  You know your body best. Never hesitate to call or go to the hospital if something doesn't feel right!    Preparing for the hospital  You re likely feeling anxious as your child s birth approaches. This is normal. To give yourself some peace of mind, pack a bag 3-4 weeks before your due date. Here is a list of things to remember:  Personal care items like toothbrush, hair brush, lip balm, lotion, shampoo, glasses, contacts  Nightgown, bathrobe, slippers  Several pairs of underwear  Comfortable clothes for you to wear home  Camera with new batteries  Cell phone and   Insurance information and any other paperwork needed for your hospital stay  Clothes for baby to wear home  An infant, rear-facing car seat for bringing home your baby (this is required by law)  What Is Group B Strep?   Group B strep (streptococcus) is a common bacteria. It can grow in a woman s vagina, rectum, or urinary tract. It is almost always harmless in adults. But in rare cases, a woman who has  group B strep can infect her baby during the birth. Infection can cause serious illness in the . The good news is that treating the mother during labor reduces the risk of the baby becoming infected. And if a  is infected, the infection can be treated. You will be screened for Group B strep at 35-36 weeks gestation.  Facts about group B strep   Learning more about group B strep can help you understand how testing and treatment can help. Here are some basic facts about group B strep:   It is not a sexually transmitted disease.   It is not the same as strep throat. (This is caused by group A strep.)   It often has no symptoms and may cause no problems in adults.   Group B strep can be transmitted during vaginal delivery. It cannot be passed during  (surgical) birth.   A mother with group B strep rarely infects her . (Infection occurs only about 1% to 2% of the time.)   When a mother is treated during labor and delivery, her baby almost never becomes infected.   Certain factors during pregnancy increase the risk of a baby becoming infected.  Possible effects on your baby   Group B strep can infect the blood. It can also cause inflammation of the baby s lungs, brain, or spinal cord. Long-term effects can include blindness, deafness, mental retardation, or cerebral palsy. And in rare cases, infection causes death. Infection is most often detected soon after the baby is born.   How your baby may become infected   Group B strep often lives in the vagina or rectum. If the amniotic sac breaks early, bacteria from the vagina can travel to the uterus, reaching the baby. Or, as the baby passes through the birth canal, it can come in contact with the bacteria. In rare cases, group B strep can also be passed to the baby after delivery. This is called late-onset group B strep. The source of this type of infection is not well understood. But some experts believe that it happens if the baby is exposed to  group B strep in the home, from the parents or siblings, or in the community.   What increases the risk?   Certain risk factors increase the chance that a baby will be infected. They include:   Breaking or leaking of the amniotic sac earlier than 37 weeks gestation   Labor earlier than 37 weeks gestation   Breaking of the amniotic sac more than 18 hours before labor begins   Fever during labor   A urinary tract infection with group B strep at any point in the pregnancy   A previous baby born with a group B strep infection

## 2018-12-19 NOTE — LETTER
December 20, 2018      Sydney Fortinobasilio Dao  [address] [place] [address]        Dear Sydney,    The test results from your last visit are back. You do not have GBS (the bacteria we checked for at your visit), so you will not need antibiotics in labor.    Please see below for your test results.    Resulted Orders   Clt. Grp B Strp Screen (MDCapsule)   Result Value Ref Range    Group B Strep Antigen Negative Negative    Allergic To Penicillin No     Narrative    Test performed by:  [facility] [address] [place] [address]  Intended use:  The [employer] Xpert GBS LB Assay, performed on the [employer]  Instrument   Systems, is a qualitative in vitro diagnostic test designed to detect Group B   Streptococcus (GBS) DNA from enriched vaginal/rectal swab specimens, using   fully automated realtime polymerase chain reaction (PCR) with fluorogenic   detection of the amplified DNA. Xpert GBS LB Assay testing is indicated as an   aid in determining GBS colonization status in antepartum women. This assay   does not diagnose or monitor treatment for GBS infections.  The [employer] Xpert   GBS LB Assay is intended for use in hospital, reference or state laboratory   settings.  The device is not intended for point-of-care use.  Methodology:  The [employer] Instrument Systems automate and integrate sample lysis, nucleic   acid purification and amplification, and detection of the target sequence in   simple or complex samples using real-time polymerase chain reaction (PCR). The   GBS primers and probe detect a target within a 3' DNA region adjacent to the   cfb gene of S. agalactiae. A fluorescent signal becomes detected and increases   each time the specific DNA strand is amplified. The Real-time PCR generates a   growth curve with number of cycles on the x-axis and fluorescence on the   y-axis. If the organism s DNA is not detected by the real-time PCR reaction   the growth curve will be flat and will be resulted as  negative.       If you have any questions, please call the clinic to make an appointment.    Sincerely,    Tamera Peter MD

## 2018-12-19 NOTE — PROGRESS NOTES
"History   Sydney Dao is a 28 year old  female who presents to clinic for a follow up OB visit.   - 36w4d with ADALI 2019  - No headache, chest pain, shortness of breath, abdominal pain/contractions, vaginal bleeding, or abnormal discharge. has been feeling baby boy move every day.   - Takes prenatal vitamins about 2-3 times a week, says they are large and difficult to swallow     A [employer]  was used for this visit.   Medical and social history and medications reviewed with patient.   Exam   BP 99/62   Pulse 79   Temp 98.1  F (36.7  C) (Oral)   Resp 18   Ht 1.502 m (4' 11.13\")   Wt 61.6 kg (135 lb 12.8 oz)   LMP 2018 (Approximate)   SpO2 98%   BMI 27.30 kg/m    General: NAD  See flowsheet for abdominal, gyn, extremities, neuro exams  Medical Decision-Making     Patient Active Problem List   Diagnosis     Pregnant and not yet delivered in second trimester     Screening for cervical cancer     - Group B strep discussed and culture collected  - discussed postpartum follow up and depression    Labor/postpartum planning  - Prior delivery complications: none  - Labor: no pain meds  - Breastfeeding: yes  - Circumcision: no  - Contraception: undecided  - Pediatrician: this clinic, with me    Follow up: 1 weeks for routine prenatal visit  Readdress: contraception plan, breastfeeding plan    Tamera Peter MD, MPH  [facility] Family Medicine Resident     Precepted patient with Jluis Bowers MD    Options for treatment and follow-up care were reviewed with the patient and/or guardian. Sydney Dao and/or guardian engaged in the decision making process and verbalized understanding of the options discussed and agreed with the final plan.    "

## 2018-12-20 LAB
ALLERGIC TO PENICILLIN: NO
GP B STREP AG SPEC QL LA: NEGATIVE

## 2019-01-03 ENCOUNTER — OFFICE VISIT (OUTPATIENT)
Dept: FAMILY MEDICINE | Facility: CLINIC | Age: 29
End: 2019-01-03
Payer: COMMERCIAL

## 2019-01-03 VITALS
HEIGHT: 59 IN | TEMPERATURE: 98.3 F | WEIGHT: 137.8 LBS | RESPIRATION RATE: 20 BRPM | SYSTOLIC BLOOD PRESSURE: 109 MMHG | OXYGEN SATURATION: 97 % | BODY MASS INDEX: 27.78 KG/M2 | DIASTOLIC BLOOD PRESSURE: 67 MMHG | HEART RATE: 84 BPM

## 2019-01-03 DIAGNOSIS — Z34.93 PREGNANT AND NOT YET DELIVERED IN THIRD TRIMESTER: Primary | ICD-10-CM

## 2019-01-03 ASSESSMENT — MIFFLIN-ST. JEOR: SCORE: 1255.69

## 2019-01-03 NOTE — PATIENT INSTRUCTIONS
[facility] Maternity: [phone]    [facility] Information  If you have any further concerns or wish to schedule another appointment, please call our office at [phone] during normal business hours (8-5, M-F).     For uncomplicated pregnancies, you will be seeing your doctor once a month until you are 28 weeks, then you will see your doctor twice a month. You will begin weekly visits at 36 weeks until you deliver.     If you have urgent medical questions that cannot wait, you may call [phone] at any time of day.     If you have a medical emergency, please call [phone].    When to call or come in to the hospital  If you notice a decrease in your baby's movement.   If your begin to experience contractions that are 5 minutes or less apart and lasting for over 45 seconds, or if contractions are becoming more painful.  If you have any bleeding or leakage of fluids.   If you have a headache not resolved with tylenol, right upper abdominal pain, or sudden onset of swelling.  You know your body best. Never hesitate to call or go to the hospital if something doesn't feel right!    Preparing for the hospital  You re likely feeling anxious as your child s birth approaches. This is normal. To give yourself some peace of mind, pack a bag 3-4 weeks before your due date. Here is a list of things to remember:  Personal care items like toothbrush, hair brush, lip balm, lotion, shampoo, glasses, contacts  Nightgown, bathrobe, slippers  Several pairs of underwear  Comfortable clothes for you to wear home  Camera with new batteries  Cell phone and   Insurance information and any other paperwork needed for your hospital stay  Clothes for baby to wear home  An infant, rear-facing car seat for bringing home your baby (this is required by law)

## 2019-01-03 NOTE — PROGRESS NOTES
Preceptor Attestation:  Patient's case reviewed and discussed with Tamera Peter MD resident and I evaluated the patient. I agree with written assessment and plan of care.  Supervising Physician:  SCARLET ROBLES MD  [facility]

## 2019-01-03 NOTE — NURSING NOTE
Due to patient being non-English speaking/uses sign language, an  was used for this visit. Only for face-to-face interpretation by an external agency, date and length of interpretation can be found on the scanned worksheet.     name: Paige Rondon  Agency: [employer]  Language: May   Telephone number: [phone]  Type of interpretation: Face-to-face, spoken

## 2019-01-03 NOTE — PROGRESS NOTES
"History   Sydney Dao is a 28 year old  female who presents to clinic for a follow up OB visit.   - 38w5d with ADALI 2019  - No headache, chest pain, shortness of breath, abdominal pain/contractions, vaginal bleeding, or abnormal discharge. has been feeling baby move every day.   - thinking about nexplanon. Wants another boy. Tolerated IUD in past. No questions. Breastfeeding.     A [employer]  was used for this visit.   Medical and social history and medications reviewed with patient.   Exam   /67   Pulse 84   Temp 98.3  F (36.8  C)   Resp 20   Ht 1.499 m (4' 11\")   Wt 62.5 kg (137 lb 12.8 oz)   LMP 2018 (Approximate)   SpO2 97%   BMI 27.83 kg/m    General: NAD  See flowsheet for abdominal, gyn, extremities, neuro exams  Medical Decision-Making     Patient Active Problem List   Diagnosis     Pregnant and not yet delivered in third trimester     Screening for cervical cancer     - GBS negative  - discussed postterm management    Labor/postpartum planning  - Prior delivery complications: none  - Labor: no pain meds  - Breastfeeding: yes  - Circumcision: no  - Contraception: undecided  - Pediatrician: this clinic, with me    Follow up: 1 weeks for routine prenatal visit  Readdress: routine, contraception plan    Tamera Peter MD, MPH  [facility] Family Medicine Resident     Precepted patient with Kailey Mccabe MD    Options for treatment and follow-up care were reviewed with the patient and/or guardian. Sydney Dao and/or guardian engaged in the decision making process and verbalized understanding of the options discussed and agreed with the final plan.    "

## 2019-01-11 ENCOUNTER — DOCUMENTATION ONLY (OUTPATIENT)
Dept: FAMILY MEDICINE | Facility: CLINIC | Age: 29
End: 2019-01-11

## 2019-01-11 DIAGNOSIS — Z34.93 PREGNANT AND NOT YET DELIVERED IN THIRD TRIMESTER: ICD-10-CM

## 2019-01-11 NOTE — PROGRESS NOTES
Sydney successfully delivered a healthy  on 01/10/2019 at [facility] via . No complications noted during delivery. No anesthesia noted, labor lasted 3hours 7 minutes. Baby born at 2ot99pd with apgars 8&9. Sydney had first degree perineal laceration and left labial laceration with [id number]. Sydney plans to bottle feed and breastfeed . Abril LOPEZ

## 2019-02-21 ENCOUNTER — OFFICE VISIT (OUTPATIENT)
Dept: FAMILY MEDICINE | Facility: CLINIC | Age: 29
End: 2019-02-21
Payer: COMMERCIAL

## 2019-02-21 VITALS
WEIGHT: 118 LBS | HEIGHT: 59 IN | RESPIRATION RATE: 18 BRPM | DIASTOLIC BLOOD PRESSURE: 69 MMHG | OXYGEN SATURATION: 98 % | TEMPERATURE: 98.5 F | HEART RATE: 55 BPM | SYSTOLIC BLOOD PRESSURE: 105 MMHG | BODY MASS INDEX: 23.79 KG/M2

## 2019-02-21 DIAGNOSIS — R20.0 NUMBNESS AND TINGLING OF BOTH FEET: ICD-10-CM

## 2019-02-21 DIAGNOSIS — R20.2 NUMBNESS AND TINGLING OF BOTH FEET: ICD-10-CM

## 2019-02-21 DIAGNOSIS — G56.03 BILATERAL CARPAL TUNNEL SYNDROME: ICD-10-CM

## 2019-02-21 DIAGNOSIS — Z30.09 ENCOUNTER FOR OTHER GENERAL COUNSELING OR ADVICE ON CONTRACEPTION: ICD-10-CM

## 2019-02-21 LAB
% GRANULOCYTES: 63.5 %G (ref 40–75)
FERRITIN SERPL-MCNC: 131 NG/ML (ref 10–130)
GLUCOSE P FAST SERPL-MCNC: 76 MG/DL (ref 51–109)
GRANULOCYTES #: 4.4 K/UL (ref 1.6–8.3)
HCT VFR BLD AUTO: 47.5 % (ref 35–47)
HEMOGLOBIN: 15 G/DL (ref 11.7–15.7)
LYMPHOCYTES # BLD AUTO: 2 K/UL (ref 0.8–5.3)
LYMPHOCYTES NFR BLD AUTO: 28.6 %L (ref 20–48)
MCH RBC QN AUTO: 29.9 PG (ref 26.5–35)
MCHC RBC AUTO-ENTMCNC: 31.6 G/DL (ref 32–36)
MCV RBC AUTO: 94.8 FL (ref 78–100)
MID #: 0.6 K/UL (ref 0–2.2)
MID %: 7.9 %M (ref 0–20)
PLATELET # BLD AUTO: 367 K/UL (ref 150–450)
RBC # BLD AUTO: 5.01 M/UL (ref 3.8–5.2)
TSH SERPL DL<=0.05 MIU/L-ACNC: 0.89 UIU/ML (ref 0.3–5)
VIT B12 SERPL-MCNC: 358 PG/ML (ref 213–816)
WBC # BLD AUTO: 7 K/UL (ref 4–11)

## 2019-02-21 ASSESSMENT — MIFFLIN-ST. JEOR: SCORE: 1161.74

## 2019-02-21 NOTE — NURSING NOTE
Due to patient being non-English speaking/uses sign language, an  was used for this visit. Only for face-to-face interpretation by an external agency, date and length of interpretation can be found on the scanned worksheet.     name: Paige Rondon  Agency: [employer]  Language: May   Telephone number: [phone]  Type of interpretation: Face-to-face, spoken

## 2019-02-21 NOTE — PATIENT INSTRUCTIONS
Wear wrist splints on both wrists with activities that cause numbness and tingling in your hands. If you notice it in the mornings, then wear the splints while you are sleeping. If you are not able to wear them, we can consider injection.   Start taking prenatal vitamin again.   Use condoms until IUD placement.

## 2019-02-21 NOTE — PROGRESS NOTES
"Subjective:   Sydney Dao is a 29 year old  female presenting for routine postpartum follow up.      Delivery summary:   - date: 1/10  - route:   - pregnancy complications: none  - delivery complications: none    Since delivery:   - bleeding: resolved  - pain: resolved  - infant feeding: breastfeeding going well  - postpartum blues / depression: has not experienced  - sexual activity: resumed at 4 wk  - contraception plan: withdrawal. Had copper IUD in past; had cramping and very little bleeding. Maybe wants 1 more child in 2 years.     Other:  - since delivery, numbness in both hands, both legs x past 3 weeks. Initially was having a lot of low back pain. Not at night. More with holding baby or sitting with crossed legs. Feet fall asleep. Hands nubm and tingling with activity. Not in mornings. All fingers both hands.     ROS:   General: No fevers  Cardiac: No chest pain or palpitations.  Breasts: no redness, warmth, pain  Pulmonary: No shortness of breath or respiratory distress  GI: No abdominal pain.  Normal bowel habits, no incontinence  : No dysuria, hematuria, no incontinence  Extremities: No edema    Medications, past medical and social history reviewed.   A [employer]  was used for this visit.   Objective:   /69   Pulse 55   Temp 98.5  F (36.9  C) (Oral)   Resp 18   Ht 1.5 m (4' 11.06\")   Wt 53.5 kg (118 lb)   SpO2 98%   BMI 23.79 kg/m     General: NAD,    Neck: No thyromegaly  Cardiovascular: RRR no murmurs  Pulmonary: clear, no crackles or wheezing  Abdomen: Soft, non-tender.   GYN: The uterus is normal non-gravid size, nontender.   : patient declined. No concerns and no h/o repair  Extremities: No edema LE. Bilateral wrists with positive Phalen at 60 seconds L>R all fingers. Bilateral wrists with positive Tinels at median nerve R>L all fingers. Negative tinel's at bilateral elbow. Normal sensation in all fingers at rest. No thenar atrophy. Positive Finkelstein's test " left wrist, negative right wrist.   Psych: mood good, affect normal  Neuro: normal sensation to light touch in all dermatomes in feet. Normal monofilament testing both feet/toes bilaterally. Normal strength with bilateral great toe dorsiflexion and plantarflexion and with ankle dorsiflexion and plantarflexion.   Back: mild tenderness midline L2-L3. No paraspinal tenderness. No SI tenderness. No gluteal tenderness. Normal hip ROM without pain. Negative SLR bilaterally.   Assessment and Plan     1. Routine postpartum follow-up  Doing well. Did not fill out PHQ9 today but discussed and no concerns with mood.     2. Encounter for other general counseling or advice on contraception  Schedule IUD in 2 wks (Mirena). Condoms until then. Discussed limited protection with breastfeeding and possibility of pregnancy prior to menstrual period.     3. Bilateral carpal tunnel syndrome  Wrist splints given. Will also help if element of DeQuervain's tenosynovitis, though not markedly positive on exam. Unusual that it was not an issue during pregnancy but moreso now. If helpful but not practical, discussed possible injections. Could also add NSAID.   - WRIST COCK-UP NON-MOLDED  - WRIST COCK-UP NON-MOLDED    4. Numbness and tingling of both feet  Unclear etiology. Given 4 extremity stocking glove distribution, will check for vitamin deficiencies and common postpartum abnormalities. She has not eaten today. Not describing symptoms of sciatic nerve issue. Consider PT if persistent.   - restarted PNV  - Ferritin ([facility])  - Thyroid Cascade ([facility])  - Vitamin B12 ([facility])  - CBC with Diff Plt ([facility])  - Gucose Fasting ([facility])    Follow up: 2 wk for IUD, follow up numbness/tingling    Tamera Peter MD, MPH  [facility] Family Medicine Resident    Precepted patient with Bryan Saleh MD    Options for treatment and follow-up care were reviewed with the patient and/or guardian. Sydney Dao and/or guardian engaged in the  decision making process and verbalized understanding of the options discussed and agreed with the final plan

## 2019-02-25 NOTE — PROGRESS NOTES
I have personally reviewed the history and examination as documented by Dr. Peter.  I was present during key portions of the visit and agree with the assessment and plan as documented for 29 yr old female here for postpartum visit. Denies depression. Has noted numbness/tingling in both hands and feet. Unclear etiology. Check labs. Braces for hands. Cont to follow. Precautions given. Anticipatory guidance given.     Bryan Saleh MD  February 25, 2019  11:00 AM

## 2019-07-25 NOTE — PROGRESS NOTES
"  REFRESH DOT PHRASE ***         HPI:       Sydney Dao is a 29 year old  female { :660228}     Patient Active Problem List   Diagnosis     Screening for cervical cancer     Bilateral carpal tunnel syndrome     Numbness and tingling of both feet       Presents {FOLLOW UP OR NEW CONCERN:557232}.    No chief complaint on file.    LAST SEEN ON 2019 FOR ROUTINE POSTPARTUM FOLLOW-UP ***    Menstrual history ***    Onset: ***    Menarche: ***        No LMP recorded.      Pregnancy test: ***    Periods are {GYN PERIOD REGULARITY:566510::\"regular q 28-30 days\"},      Cycle duration: ***    Pads/tampons used in 24 hour period: ***    Mild/Moderate/Heavy bleeding: ***    Dysmenorrhea {GYN DYSMENORRHEA:491087::\"none\"}.     Cyclic symptoms include  {GYN CYCLIC SYMPTOMS:037206::\"NONE\"}.     Additional symptoms include { INTERMENSTRUAL BLD::\"NONE\"} , fever, passage of blood clots or tissue, Fainting or dizziness sitting up or standing, Persistent vaginal discharge***    Irritability,bloating,headache or breast tenderness before a period***    Any history of Use of tampons with sudden high fever, sunburn type rash,dizziness,vomiting,watery diarrhea, rapid pulse or headache? ***    Hot flashes: ***    Vaginal dryness: ***    Breakthrough bleeding and taking birth control pills***    Postcoital bleeding***    Contraceptive methods: ***    PAP Smear: 2017 - negative for squamous intraepithelial lesion or malignancy    CBC, ferritin, TSH, Vit B12 done on 2019 - unremarkable          ***    ***    ***    Medication Reconciliation ***    {:663526}         Review of Systems:     {ROS DM :184744::\"C: NEGATIVE for fatigue, unexpected change in weight\",\"E: NEGATIVE for acute vision problems or changes\",\"R: NEGATIVE for significant cough or shortness of breath\",\"CV: NEGATIVE for chest pain, palpitations or new or worsening peripheral edema\",\"P: NEGATIVE for changes in mood or affect\"}           " " WOMEN HISTORY INTAKE:     Gynecological hx: STDs  Age at first menses:***  LMP or age at menopause:***  Age at first pregnancy:***  Number of pregnancies:***  Current contraception: {CONTRACEPTION:706}; How Long:***  Brestfeeding:***, How Long:***  History of hormone replacement therapy (HRT):***        PREVENTATIVE SCREENING:     Last Pap: ***  Last Mammogram: ***  Last Bone-Density: ***  Last Colonoscopy: ***  Low-dose Chest CT: ***  Last Td: ***         PMHX:     Patient Active Problem List   Diagnosis     Screening for cervical cancer     Bilateral carpal tunnel syndrome     Numbness and tingling of both feet       Current Outpatient Medications   Medication Sig Dispense Refill     Prenatal Vit-Fe Fumarate-FA (PRENATAL MULTIVITAMIN PLUS IRON) 27-0.8 MG TABS per tablet Take 1 tablet by mouth daily (Patient not taking: Reported on 2/21/2019) 100 tablet 3              Allergies   Allergen Reactions     No Known Drug Allergy        No results found for this or any previous visit (from the past 24 hour(s)).          Past Surgical HX:            Family HX:            Social HX:   {SOCIAL HISTORY:377232057}           Physical Exam:     There were no vitals filed for this visit.  There is no height or weight on file to calculate BMI.    {EXAM -OPEN/NORMAL/ABNL:732803::\"GENERAL APPEARANCE: healthy, alert and no distress,\"}    PELVIC EXAM ***      Assessment and Plan     There are no diagnoses linked to this encounter.    DDx ***  -pregnancy  -AUB  -medications  -systemic disease  -pelvic path  -dysfuntional uterine bleeding    Repeat labs: UPT, TSH, CBC***    Options for treatment and follow-up care were reviewed with the patient and/or guardian. Pt and/or guardian engaged in the decision making process and verbalized understanding of the options discussed and agreed with the final plan.    Precepted today with: {pvpreceptors:541539}    Cece Sheppard MD, MPH (PGY 2)  [facility] " Family Medicine Resident  Pager: [phone]

## 2019-07-26 ENCOUNTER — OFFICE VISIT (OUTPATIENT)
Dept: FAMILY MEDICINE | Facility: CLINIC | Age: 29
End: 2019-07-26
Payer: COMMERCIAL

## 2019-07-26 ENCOUNTER — TELEPHONE (OUTPATIENT)
Dept: PSYCHOLOGY | Facility: CLINIC | Age: 29
End: 2019-07-26

## 2019-07-26 VITALS
SYSTOLIC BLOOD PRESSURE: 104 MMHG | HEIGHT: 59 IN | HEART RATE: 71 BPM | BODY MASS INDEX: 24.84 KG/M2 | WEIGHT: 123.2 LBS | TEMPERATURE: 98.2 F | OXYGEN SATURATION: 97 % | DIASTOLIC BLOOD PRESSURE: 71 MMHG | RESPIRATION RATE: 16 BRPM

## 2019-07-26 DIAGNOSIS — N92.6 IRREGULAR MENSTRUAL CYCLE: Primary | ICD-10-CM

## 2019-07-26 LAB
BASOPHILS # BLD AUTO: 0 THOU/UL (ref 0–0.2)
BASOPHILS NFR BLD AUTO: 1 % (ref 0–2)
EOSINOPHIL # BLD AUTO: 0.1 THOU/UL (ref 0–0.4)
EOSINOPHIL NFR BLD AUTO: 1 % (ref 0–6)
ERYTHROCYTE [DISTWIDTH] IN BLOOD BY AUTOMATED COUNT: 12.2 % (ref 11–14.5)
HCG UR QL: NEGATIVE
HCT VFR BLD AUTO: 46.3 % (ref 35–47)
HGB BLD-MCNC: 15.3 G/DL (ref 12–16)
LYMPHOCYTES # BLD AUTO: 2.3 THOU/UL (ref 0.8–4.4)
LYMPHOCYTES NFR BLD AUTO: 32 % (ref 20–40)
MCH RBC QN AUTO: 29.7 PG (ref 27–34)
MCHC RBC AUTO-ENTMCNC: 33 G/DL (ref 32–36)
MCV RBC AUTO: 90 FL (ref 80–100)
MONOCYTES # BLD AUTO: 0.4 THOU/UL (ref 0–0.9)
MONOCYTES NFR BLD AUTO: 6 % (ref 2–10)
NEUTROPHILS # BLD AUTO: 4.4 THOU/UL (ref 2–7.7)
NEUTROPHILS NFR BLD AUTO: 60 % (ref 50–70)
PLATELET # BLD AUTO: 340 THOU/UL (ref 140–440)
PMV BLD AUTO: 9.5 FL (ref 8.5–12.5)
RBC # BLD AUTO: 5.16 MILL/UL (ref 3.8–5.4)
TSH SERPL DL<=0.05 MIU/L-ACNC: 1.19 UIU/ML (ref 0.3–5)
WBC # BLD AUTO: 7.3 THOU/UL (ref 4–11)

## 2019-07-26 ASSESSMENT — MIFFLIN-ST. JEOR: SCORE: 1185.33

## 2019-07-26 NOTE — PROGRESS NOTES
"         HPI:       Sydney Dao is a 29 year old female , who recently delivered her 3rd child on 01/10/2019, presents to clinic with concerns of not having resumption of her regular periods. She would like to know the reason for this and if there is treatment for it.    LNMP was before her 3rd pregnancy and she can't recall the date. Not on any birth control. She is still breast feeding her 3rd child 2-3 at night while at home and she is able to pump breast milk while at work. After her other pregnancies, her menses returned within a month of delivery while breastfeeding.     Menstrual history    Menarche: [age] years     Periods are regular q 28-30 days,      Cycle duration: 4 day    \"Medium\" bleeding during cycle    Pads used in 24 hour period: 3-4     Dysmenorrhea: none.     Cyclic symptoms include only breast tenderness. Denies irritability/moodiness, anxiety, changes in libido, fluid retention, depression, edema and diarrhea.     Sydney does admit to weight gain 4-5 lbs since January. She denies cold/heat intolerance, dry skin, hot flashes, vaginal dryness, postcoital bleeding. Reports normal micturition and bowel habits.    Last PAP Smear: 2017 - negative for squamous intraepithelial lesion or malignancy. No history of procedures done to the uterus and cervix.    UPT done today prior to rooming: negative    Medication Reconciliation completed    A [employer]  was used for this visit         Review of Systems:     I: NEGATIVE for worrisome rashes, moles or lesions, no heat/cold intolerance,   R: NEGATIVE for significant cough or shortness of breath  CV: NEGATIVE for chest pain, palpitations or new or worsening peripheral edema  GI: NEGATIVE for new onset or worsening nausea, vomiting or diarrhea  P: NEGATIVE for changes in mood or affect                PMHX:     Patient Active Problem List   Diagnosis     Screening for cervical cancer     Bilateral carpal tunnel syndrome     Numbness and " "tingling of both feet       No current outpatient medications on file.              Allergies   Allergen Reactions     No Known Drug Allergy        No results found for this or any previous visit (from the past 24 hour(s)).            Physical Exam:     Vitals:    07/26/19 0946   BP: 104/71   Pulse: 71   Resp: 16   Temp: 98.2  F (36.8  C)   TempSrc: Oral   SpO2: 97%   Weight: 55.9 kg (123 lb 3.2 oz)   Height: 1.5 m (4' 11.06\")     Body mass index is 24.84 kg/m .    GENERAL APPEARANCE: healthy, alert and no distress,  NECK: Thyroid without enlargement and nodules.  No cervical lymphadenopathy  RESP: lungs clear to auscultation - no rales, rhonchi or wheezes  CV: regular rate and rhythm,  and no murmur, click,  rub or gallop  ABDOMEN: soft, nontender, without hepatosplenomegaly or masses  PSYCH: mood and affect normal/bright    Assessment and Plan     (N92.6) Irregular menstrual cycle  (primary encounter diagnosis)  Comment: Most likely secondary to lactational amenorrhea considering Sydney is still breast feeding. There are no obvious signs or symptoms of hyperthyroidism or hypothyroidism. Furthermore TSH is 1.19, which is within normal range, so unlikely thyroid disorder as the cause of Sydney's secondary amenorrhea. CBC unremarkable.    Plan:    Reassured Sydney about the normal physiology of her menstrual cycle and how breastfeeding affects it. Some women may take 6-12 months before seeing their periods while breast feeding. Patient verbalized understanding.    Encouraged her to follow-up in 3-6 months if her menstrual cycle doesn't resume so that we can consider further investigations.      Options for treatment and follow-up care were reviewed with the patient and/or guardian. Pt and/or guardian engaged in the decision making process and verbalized understanding of the options discussed and agreed with the final plan.    Precepted today with: MD Cece Cordova MD, MPH (PGY 2)  [facility]" [facility] Resident  Pager: [phone]

## 2019-07-26 NOTE — TELEPHONE ENCOUNTER
Patient would like to be seen with Dr. Bonita Eddy for depression. Please see clinic visit note from 7/26/2019.

## 2019-07-26 NOTE — LETTER
July 29, 2019      Sydney Dao  [address] [place] [address]        Dear Sydney,    Your TSH is normal.  You are encouraged to follow-up in 3-6 months if her menstrual cycle doesn't resume so that we can consider further investigations.    Please see below for your test results.    Resulted Orders   HCG Qualitative Urine (UPT)  ([facility])   Result Value Ref Range    HCG Qual Urine NEGATIVE Negative    Narrative    SG ok. (1.025) --HHer, CMA   TSH  Sensitive ([facility])   Result Value Ref Range    TSH 1.19 0.30 - 5.00 uIU/mL    Narrative    Test performed by:  [facility]  [address] [place] [address]   CBC w/ Diff and Plt ([facility])   Result Value Ref Range    WBC 7.3 4.0 - 11.0 thou/uL    RBC 5.16 3.80 - 5.40 mill/uL    Hemoglobin 15.3 12.0 - 16.0 g/dL    Hematocrit 46.3 35.0 - 47.0 %    MCV 90 80 - 100 fL    MCH 29.7 27.0 - 34.0 pg    MCHC 33.0 32.0 - 36.0 g/dL    RDW 12.2 11.0 - 14.5 %    Platelets 340 140 - 440 thou/uL    Mean Platelet Volume 9.5 8.5 - 12.5 fL    % Neutrophils 60 50 - 70 %    % Lymphocytes 32 20 - 40 %    % Monocytes 6 2 - 10 %    % Eosinophils 1 0 - 6 %    % Basophils 1 0 - 2 %    Neutrophils (Absolute) 4.4 2.0 - 7.7 thou/uL    Lymphs (Absolute) 2.3 0.8 - 4.4 thou/uL    Monocytes(Absolute) 0.4 0.0 - 0.9 thou/uL    Eos (Absolute) 0.1 0.0 - 0.4 thou/uL    Baso (Absolute) 0.0 0.0 - 0.2 thou/uL    Narrative    Test performed by:  [facility]  [address] [place] [address]       If you have any questions, please call the clinic to make an appointment.    Sincerely,    JOSE A LAWSON MD

## 2019-07-26 NOTE — PROGRESS NOTES
Preceptor Attestation:  Patient's case reviewed and discussed with JOSE A LAWSON MD resident and I evaluated the patient. I agree with written assessment and plan of care.  Supervising Physician:  SCARLET ROBLES MD  [facility]

## 2019-07-26 NOTE — NURSING NOTE
Due to patient being non-English speaking/uses sign language, an  was used for this visit. Only for face-to-face interpretation by an external agency, date and length of interpretation can be found on the scanned worksheet.     name: Nuha Snyder  Agency: [employer]  Language: Hmong   Telephone number: [phone]  Type of interpretation: Face-to-face, spoken

## 2019-10-07 ENCOUNTER — TELEPHONE (OUTPATIENT)
Dept: FAMILY MEDICINE | Facility: CLINIC | Age: 29
End: 2019-10-07

## 2020-06-02 ENCOUNTER — OFFICE VISIT (OUTPATIENT)
Dept: FAMILY MEDICINE | Facility: CLINIC | Age: 30
End: 2020-06-02
Payer: COMMERCIAL

## 2020-06-02 VITALS
TEMPERATURE: 98.3 F | OXYGEN SATURATION: 100 % | DIASTOLIC BLOOD PRESSURE: 77 MMHG | HEART RATE: 65 BPM | RESPIRATION RATE: 16 BRPM | SYSTOLIC BLOOD PRESSURE: 111 MMHG | BODY MASS INDEX: 25.6 KG/M2 | WEIGHT: 127 LBS | HEIGHT: 59 IN

## 2020-06-02 DIAGNOSIS — S93.491A SPRAIN OF ANTERIOR TALOFIBULAR LIGAMENT OF RIGHT ANKLE, INITIAL ENCOUNTER: Primary | ICD-10-CM

## 2020-06-02 DIAGNOSIS — M54.50 ACUTE MIDLINE LOW BACK PAIN WITHOUT SCIATICA: ICD-10-CM

## 2020-06-02 LAB
BACTERIA: NORMAL
BILIRUBIN UR: NEGATIVE MG/DL
BLOOD UR: ABNORMAL MG/DL
CASTS: NORMAL /LPF
CLARITY, URINE: CLEAR
COLOR UR: YELLOW
CRYSTAL URINE: NORMAL /LPF
EPITHELIAL CELLS UR: <2 /LPF (ref 0–2)
GLUCOSE URINE: NEGATIVE
KETONES UR QL: NEGATIVE MG/DL
LEUKOCYTE ESTERASE UR: NEGATIVE
MUCOUS URINE: NORMAL LPF
NITRITE UR QL STRIP: NEGATIVE MG/DL
PH UR STRIP: 6 [PH] (ref 4.5–8)
PROTEIN UR: NEGATIVE MG/DL
RBC URINE: NORMAL /HPF
SP GR UR STRIP: <=1.005 (ref 1–1.03)
UROBILINOGEN UR STRIP-ACNC: ABNORMAL E.U./DL
WBC URINE: NORMAL /HPF

## 2020-06-02 RX ORDER — NAPROXEN 500 MG/1
500 TABLET ORAL 2 TIMES DAILY WITH MEALS
Qty: 30 TABLET | Refills: 0 | Status: SHIPPED | OUTPATIENT
Start: 2020-06-02 | End: 2020-09-17

## 2020-06-02 ASSESSMENT — MIFFLIN-ST. JEOR: SCORE: 1191.31

## 2020-06-02 NOTE — PROGRESS NOTES
HPI:     Sydney Dao is a 30 year old  female with PMH of carpal tunnel who presents with right ankle pain and low back pain.     Sydney Dao is here alone.     Chief Complaint   Patient presents with     Trauma     right ankle pain, fell on saturday, swelling.      Back Pain     lower back pain x 3 weeks. cold chills, no urinary problem     Right ankle pain:  Fell on Saturday while she was walking out the door (tripped)  Was carrying her baby at the time so she didn't get up right away but after 5 minutes she got up with her baby and was able to walk but was limping.   Has been swollen   has been massaging which helps- he uess a medicine during massage, she doesn't know the name but smells like menthol.   No history of past ankle injury.       Back pain  Middle of the back  Has been going on a couple weeks. No inciting event.   Feels exhausted sometimes has chills  Pain is significant- hurts when sitting for too long. Hard to fall asleep.   Has tried tylenol which doesn't really help.   No fever, no incontinence, no perineal numbness. Pain is not waking her from sleep. No pain down the legs. No pain with urination.   +constipation    Patient is worried that her back pain is from her kidneys because a friend told her that might be what's causing it.     A [employer]  was used for this visit         PMHX:     Patient Active Problem List   Diagnosis     Screening for cervical cancer     Bilateral carpal tunnel syndrome     Numbness and tingling of both feet       No current outpatient medications on file.       Social History     Tobacco Use     Smoking status: Never Smoker     Smokeless tobacco: Never Used   Substance Use Topics     Alcohol use: No     Drug use: No       Social History     Social History Narrative     Not on file       Allergies   Allergen Reactions     No Known Drug Allergy        No results found for this or any previous visit (from the past 24 hour(s)).         Review of  "Systems:   7 point ROS negative except as noted.           Physical Exam:     Vitals:    06/02/20 1519   BP: 111/77   Pulse: 65   Resp: 16   Temp: 98.3  F (36.8  C)   TempSrc: Oral   SpO2: 100%   Weight: 57.6 kg (127 lb)   Height: 1.49 m (4' 10.66\")     Body mass index is 25.95 kg/m .    General: Alert, well-appearing female in NAD  HEENT: PERRL, moist oral mucus membranes    Back exam:normal posture, shoulders, inferior scapular borders and hips even and symmetrical, moves about the exam room comfortably, full ROM. No pain with palpation of spinous processes or paraspinal muscles. No CVA tenderness. Straight leg raise negative BL. Hip flex/ext, knee flex/ext and dorsi/plantar flex all 5/5 BL.     Right ankle: No ecchymosis or erythema but + edema around the lateral malleolus. No midfoot tenderness, no tenderness at posterior 5th metatarsal, no pain at posterior medial or lateral malleolus. No pain along tibia or fibula. There is pain with palpation of ATFL. 5/5 dorsi/plantar flexion, inversion and eversion. Anterior drawer shows normal endpoint.     Ext: Warm, well perfused, 2+ BL radial pulses, no LE edema other than at right ankle mentioned above.   Skin: No rash on limited skin exam  Psych: Mood appropriate to visit content, full affect, rational thought content and process      Assessment and Plan     1. Sprain of anterior talofibular ligament of right ankle, initial encounter  No indication for xray today. Discussed nature of sprains, discussed RICE. Provided brace and discussed ROM exercises. Ok to try nsaid for pain control.   - naproxen (NAPROSYN) 500 MG tablet; Take 1 tablet (500 mg) by mouth 2 times daily (with meals)  Dispense: 30 tablet; Refill: 0  - Lace-up Ankle Brace    2. Acute midline low back pain without sciatica  Her exam is overwhelmingly normal. Interestingly I cannot find an area of soreness with palpation which speaks against MSK cause but she does state it's worse with prolonged sitting or " standing which would point to a muscular soreness. We discussed symptomatic cares including NSAIDs. Since the patient was worried about her kidneys/kidney infection we did a UA today that was normal (she's on her menstrual cycle and small amt of blood seen but otherwise normal). If pain persists after 4 weeks symptomatic cares I would consider xray of lumbar spine since it's not fitting perfectly into a clear underlying etiology. I instructed her to let us know if it is not getting better.    - Urinalysis, Micro If ([facility])  - Urine Microscopic ([facility])      There are no discontinued medications.    Options for treatment and follow-up care were reviewed with the patient and/or guardian. Sydney Dao and/or guardian engaged in the decision making process and verbalized understanding of the options discussed and agreed with the final plan.    Claudia Preciado MD  [facility]   Pager: [phone]

## 2020-06-02 NOTE — LETTER
June 8, 2020      Sydney Dao  [address] [place] [address]        Dear ,    We are writing to inform you of your test results.    Your urine test showed that you do not have a urine infection. If you continue to have pain please let us know so that we can see you back in clinic.     Resulted Orders   Urinalysis, Micro If ([facility])   Result Value Ref Range    Specific Gravity Urine <=1.005 1.005 - 1.030    pH Urine 6.0 4.5 - 8.0    Leukocyte Esterase UR Negative NEGATIVE    Nitrite Urine Negative NEGATIVE mg/dL    Protein UR Negative NEGATIVE mg/dL    Glucose Urine Negative NEGATIVE    Ketones Urine Negative NEGATIVE mg/dL    Urobilinogen mg/dL 0.2 E.U./dL 0.2 E.U./dL E.U./dL    Bilirubin UR Negative NEGATIVE mg/dL    Blood UR 2+ (A) NEGATIVE mg/dL    Color Urine Yellow     Clarity, urine Clear    Urine Microscopic ([facility])   Result Value Ref Range    WBC Urine None <5 /hpf    RBC Urine 2-5 <5 /hpf    Epithelial Cells UR <2 0 - 2 /lpf    Mucous Urine None NONE lpf    Casts Urine None NONE /lpf    Crystal Urine None NONE /lpf    Bacteria Wet Prep Few None       If you have any questions or concerns, please call the clinic at the number listed above.       Sincerely,        Claudia Preciado MD

## 2020-06-02 NOTE — NURSING NOTE
Due to patient being non-English speaking/uses sign language, an  was used for this visit. Only for face-to-face interpretation by an external agency, date and length of interpretation can be found on the scanned worksheet.     name: gm   Agency: [employer]  Language: May   Telephone number: [phone]  Type of interpretation: Telephone, spoken

## 2020-07-29 ENCOUNTER — TELEPHONE (OUTPATIENT)
Dept: FAMILY MEDICINE | Facility: CLINIC | Age: 30
End: 2020-07-29

## 2020-07-29 DIAGNOSIS — Z20.822 EXPOSURE TO 2019 NOVEL CORONAVIRUS: Primary | ICD-10-CM

## 2020-07-29 NOTE — TELEPHONE ENCOUNTER
[facility] phone call message- general phone call:    Reason for call: Patient state her work place has shut down due to Covid+ exposure. Patient is needing to be tested before she can return to work.    Return call needed: Yes    OK to leave a message on voice mail? Yes    Primary language: Hmong      needed? Yes    Call taken on July 29, 2020 at 3:12 PM by Sukumar Snyder

## 2020-07-29 NOTE — TELEPHONE ENCOUNTER
Sydney was exposed to positive COVID19 individual at work (source was not disclosed), informed by her employer today of exposure. Last work day was yesterday. 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 Currently denies fever/chills, cough, sob, rash, diarrhea, sore throat and/or headache. Otherwise feels well. No ill house hold members. Sydney meets asymptomatic criteria for testing. RN and lab appt scheduled for tomorrow morning, no  for this afternoon unable to come in today. COVID19 PCR order has been placed.  Marquez LOPEZ

## 2020-07-30 DIAGNOSIS — Z20.822 EXPOSURE TO 2019 NOVEL CORONAVIRUS: ICD-10-CM

## 2020-07-30 LAB
COVID-19 VIRUS PCR TO U OF MN - SOURCE: NORMAL
SARS-COV-2 RNA SPEC QL NAA+PROBE: NOT DETECTED

## 2020-07-30 NOTE — PATIENT INSTRUCTIONS
Instructions for Patients  Patient Education   After Your COVID-19 (Coronavirus) Test  You have been tested for COVID-19 (coronavirus).   If you'll have surgery in the next few days, we'll let you know ahead of time if you have the virus. Please call your surgeon's office with any questions.    For all other patients: Results are usually available within 2 to 10 days.    If your test result is positive, you'll get a phone call letting you know. (A positive test means that you have the virus.)    If your test result is negative, you'll get a letter in the mail. (A negative test suggests you do not have the virus.) If you use [employer], you'll get a message from [employer] when your result is ready.  After 7 to 10 days, if you have not gotten your results:     Call [phone] and ask to speak with our COVID-19 results team.    If you're being treated at an infusion center: Call your infusion center directly.  What are the symptoms of COVID-19?  Symptoms may include any of the following: Fever, cough, trouble breathing, headache, body aches, sore throat, runny or stuffy nose, fatigue (feeling very tired), diarrhea (loose poop), and nausea or vomiting (feeling sick to the stomach or throwing up).  You may already have symptoms of COVID-19, or they may show up later.  What should I do if I have symptoms?  If you're having surgery: Call your surgeon's office.  For all other patients: Stay home and away from others (self-isolate) until ...    You've had no fever--and no medicine that reduces fever--for 3 full days (72 hours), AND    Other symptoms have gotten better. For example, your cough or breathing has improved, AND    At least 10 days have passed since your symptoms first started.  During this time    Stay in your own room, even for meals. Use your own bathroom if you can.    Stay away from others in your home. No hugging, kissing or shaking hands. No visitors.    Don't go to work, school or anywhere else.    Clean  "\"high touch\" surfaces often (doorknobs, counters, handles). Use household cleaning spray or wipes. You'll find a full list of  on the EPA website: www.epa.gov/pesticide-registration/list-n-disinfectants-use-against-sars-cov-2.    Cover your mouth and nose with a mask, tissue or washcloth to avoid spreading germs.    Wash your hands and face often. Use soap and water.    Caregivers in these groups are at risk for severe illness due to COVID-19:  ? People 65 years and older  ? People who live in a nursing home or long-term care facility  ? People with chronic disease (lung, heart, cancer, diabetes, kidney, liver, immunologic)  ? People who have a weakened immune system, including those who:    Are in cancer treatment    Take medicine that weakens the immune system, such as corticosteroids    Had a bone marrow or organ transplant    Have an immune deficiency    Have poorly controlled HIV or AIDS    Are obese (body mass index of 40 or higher)    Smoke regularly    Caregivers should wear gloves while washing dishes, handling laundry and cleaning bedrooms and bathrooms.    Use caution when washing and drying laundry: Don't shake dirty laundry and use the warmest water setting that you can.    For more tips on managing your health at home, go to www.cdc.gov/coronavirus/2019-ncov/downloads/10Things.pdf.  How can I take care of myself at home?  1. Get lots of rest. Drink extra fluids (unless a doctor has told you not to).  2. Take Tylenol (acetaminophen) for fever or pain. If you have liver or kidney problems, ask your family doctor if it's okay to take Tylenol.     Adults can take either:  ? 650 mg (two 325 mg pills) every 4 to 6 hours, or   ? 1,000 mg (two 500 mg pills) every 8 hours as needed.  ? Note: Don't take more than 3,000 mg in one day. Acetaminophen is found in many medicines (both prescribed and over-the-counter medicines). Read all labels to be sure you don't take too much.   For children, check the " Tylenol bottle for the right dose. The dose is based on the child's age or weight.  3. If you have other health problems (like cancer, heart failure, an organ transplant or severe kidney disease): Call your specialty clinic if you don't feel better in the next 2 days.  4. Know when to call 911. Emergency warning signs include:  ? Trouble breathing or shortness of breath  ? Chest pain or pressure that doesn't go away  ? Feeling confused like you haven't felt before, or not being able to wake up  ? Bluish-colored lips or face  5. If your doctor prescribed a blood thinner medicine: Follow their instructions.  Where can I get more information?    [facility] - About COVID-19:   [email or web address]    CDC - If You're Sick: cdc.gov/coronavirus/2019-ncov/about/steps-when-sick.html    [facility] - Ending Home Isolation: www.cdc.gov/coronavirus/2019-ncov/hcp/disposition-in-home-patients.html    [facility] - Caring for Someone: www.cdc.gov/coronavirus/2019-ncov/if-you-are-sick/care-for-someone.html    [facility] - Interim Guidance for Hospital Discharge to Home: www.health.[facility].[place]/diseases/coronavirus/hcp/hospdischarge.pdf    [facility] clinical trials (COVID-19 research studies): clinicalaffairs.[facility]-clinical-trials    Below are the COVID-19 hotlines at the [place] Department of Health ([facility]). Interpreters are available.  ? For health questions: Call 467-999-9240 or 1-932.907.9242 (7 a.m. to 7 p.m.)  ? For questions about schools and childcare: Call 721-996-1973 or 1-216.371.6600 (7 a.m. to 7 p.m.)    For informational purposes only. Not to replace the advice of your health care provider. Clinically reviewed by Infection Prevention and the [facility] COVID-19 Clinical Team. Copyright   2020 [employer]. All rights reserved. [employer] [id number] - Rev 06/07/20.             Thank you for taking steps to prevent the spread of this virus.  o Limit your contact with others.  o Wear a simple  mask to cover your cough.  o Wash your hands well and often.  o If you need medical care, go to [facility]org or contact your health care provider.     For more about COVID-19 and caring for yourself at home, visit the CDC website at https://www.cdc.gov/coronavirus/2019-ncov/about/steps-when-sick.html.     To learn about care at [facility], please go to [email or web address].     [facility] clinical trials (COVID-19 research studies): clinicalaffairs.[facility]-clinical-trials.    Below are the COVID-19 hotlines at the [facility] of Health ([facility]). Interpreters are available.     For health questions: Call 757-295-5950 or 1-272.114.9373 (7 a.m. to 7 p.m.)    For questions about schools and childcare: Call 397-118-2058 or 1-692.531.3236 (7 a.m. to 7 p.m.)

## 2020-08-01 ENCOUNTER — COMMUNICATION - HEALTHEAST (OUTPATIENT)
Dept: SCHEDULING | Facility: CLINIC | Age: 30
End: 2020-08-01

## 2020-08-03 NOTE — RESULT ENCOUNTER NOTE
COVID result negative, please call the patient with results. If should experience symptoms to call the clinic. Thank you. Marquez LOPEZ

## 2020-09-09 DIAGNOSIS — N91.2 AMENORRHEA: Primary | ICD-10-CM

## 2020-09-09 NOTE — PROGRESS NOTES
Patient scheduled for UPT visit with me tomorrow AM.  I have not yet seen the patient, however note that tomorrow's clinic note states patient had positive UPT at home 3 months ago.  Discussed with RN and will plan for dating US to be done tomorrow in clinic after I see the patient.    Diagnoses and all orders for this visit:    Amenorrhea  -     US OB <14 WKS SINGLE OR FIRST GESTATION; Future      Heaven Bradshaw MD  [facility] Resident

## 2020-09-17 ENCOUNTER — OFFICE VISIT (OUTPATIENT)
Dept: FAMILY MEDICINE | Facility: CLINIC | Age: 30
End: 2020-09-17
Payer: COMMERCIAL

## 2020-09-17 DIAGNOSIS — N91.2 AMENORRHEA: Primary | ICD-10-CM

## 2020-09-17 DIAGNOSIS — N91.2 AMENORRHEA: ICD-10-CM

## 2020-09-17 DIAGNOSIS — O21.9 NAUSEA AND VOMITING DURING PREGNANCY: ICD-10-CM

## 2020-09-17 DIAGNOSIS — Z32.01 PREGNANCY TEST POSITIVE: ICD-10-CM

## 2020-09-17 LAB — HCG UR QL: POSITIVE

## 2020-09-17 RX ORDER — PYRIDOXINE HCL (VITAMIN B6) 25 MG
25 TABLET ORAL 3 TIMES DAILY PRN
Qty: 30 TABLET | Refills: 0 | Status: SHIPPED | OUTPATIENT
Start: 2020-09-17 | End: 2020-09-30

## 2020-09-17 RX ORDER — PRENATAL VIT/IRON FUM/FOLIC AC 27MG-0.8MG
1 TABLET ORAL DAILY
Qty: 90 TABLET | Refills: 3 | Status: SHIPPED | OUTPATIENT
Start: 2020-09-17 | End: 2021-01-22

## 2020-09-17 NOTE — PROGRESS NOTES
Due to patient being non-English speaking/uses sign language, an  was used for this visit. Only for face-to-face interpretation by an external agency, date and length of interpretation can be found on the scanned worksheet.     name: Alejandra   Agency: [employer]  Language: Hmong   Telephone number: .  Type of interpretation: Telephone, spoken

## 2020-09-17 NOTE — PROGRESS NOTES
HPI       Sydney Dao is a 30 year old female who presents for:    Urine pregnancy test  LMP: 2020, unsure  Home pregnancy test: positive x2 in July  Pregnancy test performed on arrival today: yes     Planned pregnancy: yes  Father: lives with  3 children  Vaginal bleeding: no  Nausea: yes with vomiting in mornings  Breast tenderness: yes  Weight gain: 5 lbs  Other symptoms (fatigue, muscle spasm, constipation): no  Plans to follow with: [facility]  Taking prenatal vitamin: no, will prescribe  Reviewed medication list: yes     OB history: , most recently delivered 1/10/2019, followed by [facility]  Prior pregnancy/delivery/postpartum complications: no  Breastfeeding: yes until 1 yo     Social history: Lives with  and 3 children  Tobacco use: no  ETOH: no  Illicit drug use: no    [employer] Better  was required for this visit.    Patient Active Problem List   Diagnosis     Screening for cervical cancer     Bilateral carpal tunnel syndrome     Numbness and tingling of both feet       No current outpatient medications.       Allergies   Allergen Reactions     No Known Drug Allergy             Review of Systems:   Complete 6-point ROS negative except as per HPI           Physical Exam:     There were no vitals filed for this visit.  There is no height or weight on file to calculate BMI.    GENERAL: healthy, alert and no distress  RESP: lungs clear to auscultation - no rales, no rhonchi, no wheezes  CV: regular rates and rhythm, normal S1 S2, no S3 or S4 and no murmur, no click or rub  ABDOMEN: soft, no tenderness, uterus below umbilicus  MS: extremities- no gross deformities noted, no edema    Orders Only on 2020   Component Date Value Ref Range Status     COVID-19 Virus PCR to [facility] - So* 2020 Nasopharyngeal   Final     COVID-19 Virus PCR to [facility] - Re* 2020 Not Detected   Final    Comment: Collection of multiple specimens from the same patient may be  necessary to  detect the virus. The possibility of a false negative should be considered if  the patient's recent exposure or clinical presentation suggests 2019 nCOV  infection and diagnostic tests for other causes of illness are negative.   Repeat  testing may be considered in this setting.  Viral RNA was extracted via a validated method and subsequently underwent  single step reverse transcriptase-real time polymerase chain reaction using  primers to the CDC specified N1,N2 gene targets of CoV2 and human RNP as an  internal control.  A negative result does not rule out the presence of real-time PCR inhibitors   in  the specimen or COVID-19 RNA in concentrations below the limit of detection of  the assay. The possibility of a false negative should be considered if the  patients recent exposure or clinical presentation suggests COVID-19.   Additional  testing or repeat testing requires consultation with the laboratory.  Aleks                           opharyngeal specimen is the preferred choice for swab-based SARS CoV2  testing. When collection of a nasopharyngeal swab is not possible the   following  are acceptable alternatives:  an oropharyngeal (OP) specimen collected by a healthcare professional, or a  nasal mid-turbinate (NMT) swab collected by a healthcare professional or by  onsite self-collection (using a flocked tapered swab), or an anterior nares  specimen collected by a healthcare professional or by onsite self-collection  (using a round foam swab). (Centers for Disease Control)  Testing performed by [facility], Room [address], [address] [place] [address]. This test was developed and its  performance characteristics determined by the [facility]. It has not been cleared or approved by the FDA.  The laboratory is regulated under the Clinical Laboratory Improvement  Amendments of 1988 (CLIA-88) as qualified to perform high-complexity  testing.  This test i                           s used for clinical purposes. It should not be regarded as  investigational or for research.  Performed and/or entered by:  [facility] [address] [address]          Results for orders placed or performed in visit on 09/17/20 (from the past 24 hour(s))   HCG Qualitative Urine (UPT)  ([facility])   Result Value Ref Range    HCG Qual Urine Positive (A) Negative       Assessment and Plan     Sydney was seen today for upt.    Diagnoses and all orders for this visit:    Amenorrhea  -     HCG Qualitative Urine (UPT)  ([facility])    Nausea and vomiting during pregnancy  -     pyridOXINE (VITAMIN B6) 25 MG tablet; Take 1 tablet (25 mg) by mouth 3 times daily as needed (nausea)  -     doxylamine (UNISOM) 25 MG TABS tablet; Take 0.5 tablets (12.5 mg) by mouth nightly as needed (nausea)    Pregnancy test positive  -     Prenatal Vit-Fe Fumarate-FA (PRENATAL MULTIVITAMIN W/IRON) 27-0.8 MG tablet; Take 1 tablet by mouth daily      Pregnancy option reviewed: Desired pregnancy  Dating by LMP: 3/8/2021 based on LMP (unsure)  Will be followed by PVC: yes  Early OB referral: No  Initial OB labs: At new OB appt  Prenatal vitamins: prescribed  Safe medications during pregnancy: reviewed  OB intake: RN's notified  Dating US: today    Advised to call or return to clinic if severe cramping, abdominal pain or vaginal bleeding.  Eduation provided regarding nutrition, smoking, ETOH and drug use, prenatal vitamins and safe OTC medications during pregnancy.    Options for treatment and follow-up care were reviewed with the patient and/or guardian. Sydney Dao and/or guardian engaged in the decision making process and verbalized understanding of the options discussed and agreed with the final plan.  Precepted with Dr. CRISTIANO Bradshaw MD  [facility] Medicine Resident  Pager [phone]

## 2020-09-17 NOTE — PATIENT INSTRUCTIONS
Pregnancy Safe OTC Medications  Over the counter medications should be used sparingly and as directed. It is important to read the label to confirm ALL ingredients prior to use. If symptoms persist or worsen, call our office.     Allergies  Avoid Sudafed (especially in the 1st trimester).  Diphenhydramine (Benadryl )  Loratidine (Claritin )  Cetirizine (Zyrtec )    Cold/Flu  Rest, increase fluids, air humidifier. Do not take Sudafed, Oxymetazoline (Afrin ) nasal spray, or products that contain alcohol.  Saline nasal drops or spray  Warm salt/water gargle  Geovani's VaporRub    Halls'( ) cough drops, Cepacol ( ) lozenges  Dextromethorphan (Robitussin )  Guaifenesin (Mucinex )     Constipation  Increase dietary fibers (fruits, vegetables, grains), fluids and exercise.  Fiber supplement (Metamucil , Fiberall , FiberCon )  Polyethylene glycol (MiraLAX )  Milk of Magnesia   Docusate (Colace , Shannon-Colace )  Senna (Senokot , Shannon-Colace )    Diarrhea  Increase fluids, follow a bland diet. Avoid Imodium  and Lomotil .     Gas  Simethicone (Gas-X , Mylicon , Mylanta Gas )    Heartburn/Indigestion  Do not recline after meals. Try to sleep with your head elevated. Do not take PeptoBismol or Caroline-seltzer  Calcium carbonate (Tums , Rolaids )  Aluminum hydroxide and magnesium hydroxide (Maalox , Mylanta )  Famotidine (Pepcid AC )  Milk of magnesia    Hemorrhoids  Try sitz baths.   Tucks pads  Hydrocortisone cream  Topical phenylephrine (Preparation H )    Insomnia  Benadryl   Doxylamine    Nausea/Vomiting  Louise products  Louise capsules (250 mg up to 4 times daily)  Vitamin B6 10-25 mg up to 3 times per day  Doxylamine 1/2 tab (12.5 mg) of 25 mg tablet up to three times daily (can cause drowsiness)     Pain/Headache  Increase fluids. Do not take ibuprofen (Advil , Motrin ), naproxen (Aleve ), or aspirin unless advised by a provider (please note these are commonly combined in OTC products).  Acetaminophen (Tylenol ) - max dose  3000 mg per day    Yeast infection  Clotrimazole (Lotrimin 7 )  Miconazole (Monistat 7 )  Contact your physician to make sure you do not have a more serious infection. 7 day products are preferred over one or three day products.

## 2020-09-17 NOTE — NURSING NOTE
Due to patient being non-English speaking/uses sign language, an  was used for this visit. Only for face-to-face interpretation by an external agency, date and length of interpretation can be found on the scanned worksheet.     name: Alejandra   Agency: [employer]  Language: Lexyong   Telephone number: [phone]  Type of interpretation: Telemedicine, spoken

## 2020-09-17 NOTE — PROGRESS NOTES
Preceptor Attestation:  Patient's case reviewed and discussed with the resident, Heaven Bradshaw MD, and I personally evaluated the patient. I agree with written assessment and plan of care.    Supervising Physician:  Sabina Guzman MD   [facility]

## 2020-09-18 ENCOUNTER — DOCUMENTATION ONLY (OUTPATIENT)
Dept: FAMILY MEDICINE | Facility: CLINIC | Age: 30
End: 2020-09-18

## 2020-09-18 NOTE — PROGRESS NOTES
An  was used for this call. Called patient to conduct intake over the phone, no answer. Left VM to call clinic back. Abril LOPEZ

## 2020-09-29 ENCOUNTER — DOCUMENTATION ONLY (OUTPATIENT)
Dept: FAMILY MEDICINE | Facility: CLINIC | Age: 30
End: 2020-09-29

## 2020-09-29 NOTE — PROGRESS NOTES
"Sydney is a  hmong-speaking patient who presented to clinic with a positive UPT. Her ADALI is 2021. 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 Ariana is same FOB as previous. Ariana currently works in medical assembly. Sydney notes numbness and tingling in her BLE during last pregnancy, resolved during postpartum period. Sydney also endorses   \"a small amount of depression after her last baby was born\". Education provided on close follow-up. No other medical history noted. All previous deliveries were done vaginally with her 2013 delivery done at home. No complications noted during delivery. Sydney will follow  for her pregnancy.    Average Risk Category  No significant risk factors: No    At Risk Category (up to 3)  Teen pregnancy: No  Poor social situation: No  Domestic abuse: No  Financial difficulties: No  Smoker: No  H/O  deliver: No  H/O drug abuse: No  Non-English speaking: Yes  Advanced maternal age: No  GDM risks: No  Previous C/S: No  H/O PIH: No  H/O STIs: No  H/O mental health concerns: No  Onset care > 20 weeks: No  Other:     High Risk Category (4 or more At Risk or)  Diabetes/GDM: No  Multiple gestation: No  Chronic hypertension: No  Significant hx of asthma: No  Fetal demise > 20 weeks: No  Positive tox screen: No  Current mental health treatment: No  Other:     Risk: At Risk   Date determined: 2021    "

## 2020-09-30 ENCOUNTER — OFFICE VISIT (OUTPATIENT)
Dept: FAMILY MEDICINE | Facility: CLINIC | Age: 30
End: 2020-09-30
Payer: COMMERCIAL

## 2020-09-30 VITALS
RESPIRATION RATE: 22 BRPM | TEMPERATURE: 98 F | HEART RATE: 75 BPM | OXYGEN SATURATION: 98 % | SYSTOLIC BLOOD PRESSURE: 102 MMHG | HEIGHT: 59 IN | WEIGHT: 130.2 LBS | DIASTOLIC BLOOD PRESSURE: 52 MMHG | BODY MASS INDEX: 26.25 KG/M2

## 2020-09-30 DIAGNOSIS — Z34.82 ENCOUNTER FOR SUPERVISION OF OTHER NORMAL PREGNANCY, SECOND TRIMESTER: Primary | ICD-10-CM

## 2020-09-30 LAB
% IMMATURE GRANULOCYTES: 0 %
ABS IMMATURE GRANULOCYTES: 0 THOU/UL
BACTERIA: NORMAL
BASOPHILS # BLD AUTO: 0 THOU/UL (ref 0–0.2)
BASOPHILS NFR BLD AUTO: 0 % (ref 0–2)
BILIRUBIN UR: NEGATIVE MG/DL
BLOOD UR: NEGATIVE MG/DL
CLUE CELLS: NORMAL
EOSINOPHIL # BLD AUTO: 0.1 THOU/UL (ref 0–0.4)
EOSINOPHIL NFR BLD AUTO: 1 % (ref 0–6)
ERYTHROCYTE [DISTWIDTH] IN BLOOD BY AUTOMATED COUNT: 13 % (ref 11–14.5)
GLUCOSE URINE: NEGATIVE
HCT VFR BLD AUTO: 38.8 % (ref 35–47)
HGB BLD-MCNC: 12.6 G/DL (ref 12–16)
HIV 1+2 AB+HIV1 P24 AG SERPL QL IA: NEGATIVE
KETONES UR QL: NEGATIVE MG/DL
LEUKOCYTE ESTERASE UR: NEGATIVE
LYMPHOCYTES # BLD AUTO: 2.3 THOU/UL (ref 0.8–4.4)
LYMPHOCYTES NFR BLD AUTO: 27 % (ref 20–40)
MCH RBC QN AUTO: 29.9 PG (ref 27–34)
MCHC RBC AUTO-ENTMCNC: 32.5 G/DL (ref 32–36)
MCV RBC AUTO: 92 FL (ref 80–100)
MONOCYTES # BLD AUTO: 0.5 THOU/UL (ref 0–0.9)
MONOCYTES NFR BLD AUTO: 6 % (ref 2–10)
MOTILE TRICHOMONAS: NEGATIVE
NEUTROPHILS # BLD AUTO: 5.7 THOU/UL (ref 2–7.7)
NEUTROPHILS NFR BLD AUTO: 66 % (ref 50–70)
NITRITE UR QL STRIP: NEGATIVE MG/DL
ODOR: NORMAL
PH UR STRIP: 6.5 [PH] (ref 4.5–8)
PH WET PREP: NORMAL (ref 3.8–4.5)
PLATELET # BLD AUTO: 312 THOU/UL (ref 140–440)
PMV BLD AUTO: 10.3 FL (ref 8.5–12.5)
PROTEIN UR: NEGATIVE MG/DL
RBC # BLD AUTO: 4.21 MILL/UL (ref 3.8–5.4)
SP GR UR STRIP: 1.02 (ref 1–1.03)
UROBILINOGEN UR STRIP-ACNC: NORMAL E.U./DL
WBC # BLD AUTO: 8.7 THOU/UL (ref 4–11)
WBC WET PREP: <2 (ref 2–5)
YEAST: NORMAL

## 2020-09-30 ASSESSMENT — MIFFLIN-ST. JEOR: SCORE: 1211.21

## 2020-09-30 NOTE — PROGRESS NOTES
Preceptor Attestation:  Patient's case reviewed and discussed with JOSE A LAWSON MD resident and I evaluated the patient. I agree with written assessment and plan of care.  Supervising Physician:  Jluis Bowers MD, MD VEGA  [facility]

## 2020-09-30 NOTE — PATIENT INSTRUCTIONS
[facility] Information- FAX NUMBER: [phone]  If you have any further concerns or wish to schedule another appointment, please call our office at [phone] during normal business hours (8-5, M-F).      For uncomplicated pregnancies, you will be seeing your doctor once a month until you are 28 weeks, then you will see your doctor twice a month. You will begin weekly visits at 36 weeks until you deliver.      If you have urgent medical questions that cannot wait, you may call [phone] at any time of day.      If you have a medical emergency, please call 911.     When to call or come in to the hospital    If you notice a decrease in your baby's movement.     If your begin to experience contractions that are 5 minutes or less apart and lasting for over 45 seconds, or if contractions are becoming more painful.    If you have any bleeding or leakage of fluids.     If you have a headache not resolved with tylenol, right upper abdominal pain, or sudden onset of swelling.    You know your body best. Never hesitate to call or go to the hospital if something doesn't feel right!    [facility]  1. Address: [address] [place] [address]. [place] [address]  2. Phone: [phone]     Preparing for the hospital  You re likely feeling anxious as your child s birth approaches. This is normal. To give yourself some peace of mind, pack a bag 3-4 weeks before your due date. Here is a list of things to remember:    Personal care items like toothbrush, hair brush, lip balm, lotion, shampoo, glasses, contacts    Nightgown, bathrobe, slippers    Several pairs of underwear    Comfortable clothes for you to wear home    Camera with new batteries    Cell phone and     Insurance information and any other paperwork needed for your hospital stay    Clothes for baby to wear home    An infant, rear-facing car seat for bringing home your baby (this is required by law)    In case of an EMERGENCY or you START TO FEEL  CONTRACTIONS:  Once you start to feel contractions (I.e. increased abdominal discomfort/cramping, with increase frequency) and/or notice gush of water/blood coming from your vagina, PLEASE GO TO [facility] - LABOR AND DELIVERY.    My Pager sometimes [phone] --> REQUEST THE NURSING STAFF TO CALL ME (DR. JOSE A SHEPPARD) - THEY HAVE MY CELL NUMBER IN THE UNIT.  Jose A Sheppard MD, MPH (PGY 3)  [facility] Family Medicine Resident

## 2020-09-30 NOTE — NURSING NOTE
Due to patient being non-English speaking/uses sign language, an  was used for this visit. Only for face-to-face interpretation by an external agency, date and length of interpretation can be found on the scanned worksheet.     name: Rosalio  Agency: [employer]  Language: May   Telephone number: [phone]  Type of interpretation: Telephone, spoken

## 2020-09-30 NOTE — PROGRESS NOTES
"         HPI:         Sydney Dao is a 30 year old  , Patient's last menstrual period was 2020. based on US done on 2020 (when she was 14w5d) today she is 16w4d with ADALI 2021. She presents today for her first prenatal visit. (However according LNMP 2020 she is 17w1d ADALI 2020)    Chief Complaint   Patient presents with     Prenatal Care     NOB     Medication Reconciliation     Needs attention     Concerns today: none  -Patient reports no vaginal bleeding, no contractions/severe cramping, no leakage of fluid.   -Fetal movement is present, minimal  -No nausea/vomiting.   -No heartburn.   -No vaginal discharge.   -No dysuria.   -No headache, vision changes, lower extremity swelling, upper abdominal pain, chest pain, shortness of breath.   -Mood has been .  -Last PAP 2017 - normal    Medication Reconciliation completed         Review of Systems:     C: NEGATIVE for fatigue, unexpected change in weight  E: NEGATIVE for acute vision problems or changes  R: NEGATIVE for significant cough or shortness of breath  CV: NEGATIVE for chest pain, palpitations or new or worsening peripheral edema  P: NEGATIVE for changes in mood or affect       Allergies   Allergen Reactions     No Known Drug Allergy             PMHX:     Patient Active Problem List   Diagnosis     Screening for cervical cancer     Bilateral carpal tunnel syndrome     Numbness and tingling of both feet       OB History    Para Term  AB Living   4 3 3 0 0 3   SAB TAB Ectopic Multiple Live Births   0 0 0 0 3      # Outcome Date GA Lbr Ajay/2nd Weight Sex Delivery Anes PTL Lv   4 Current            3 Term 01/10/19 39w5d  3.033 kg (6 lb 11 oz) M  None N BARBIE   2 Term 14 39w2d  2.948 kg (6 lb 8 oz) F  None N BARBIE      Birth Comments: \"Intestinal problems for baby requiring surgery\"   1 Term 13 38w2d 09:00 2.268 kg (5 lb) F Outside Hosp None N BARBIE      Birth Comments: had ctx, SROM,no problems " during preg      Name: True       History reviewed. No pertinent past medical history.    Current Outpatient Medications   Medication Sig Dispense Refill     Prenatal Vit-Fe Fumarate-FA (PRENATAL MULTIVITAMIN W/IRON) 27-0.8 MG tablet Take 1 tablet by mouth daily 90 tablet 3     doxylamine (UNISOM) 25 MG TABS tablet Take 0.5 tablets (12.5 mg) by mouth nightly as needed (nausea) (Patient not taking: Reported on 9/30/2020) 30 tablet 0     pyridOXINE (VITAMIN B6) 25 MG tablet Take 1 tablet (25 mg) by mouth 3 times daily as needed (nausea) (Patient not taking: Reported on 9/30/2020) 30 tablet 0       No results found for this or any previous visit (from the past 24 hour(s)).          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   Social Needs     Financial resource strain: None     Food insecurity     Worry: None     Inability: None     Transportation needs     Medical: None     Non-medical: None   Tobacco Use     Smoking status: Never Smoker     Smokeless tobacco: Never Used   Substance and Sexual Activity     Alcohol use: No     Drug use: No     Sexual activity: Yes     Partners: Male   Lifestyle     Physical activity     Days per week: None     Minutes per session: None     Stress: None   Relationships     Social connections     Talks on phone: None     Gets together: None     Attends Religion service: None     Active member of club or organization: None     Attends meetings of clubs or organizations: None     Relationship status: None     Intimate partner violence     Fear of current or ex partner: None     Emotionally abused: None     Physically abused: None     Forced sexual activity: None   Other Topics Concern     Parent/sibling w/ CABG, MI or  "angioplasty before 65F 55M? Not Asked   Social History Narrative     None     Have you used any tobacco products, alcohol or any other drugs during this pregnancy before or after your knew you were pregnant? no         Physical Exam:     Vitals:    20 0809   BP: 102/52   Pulse: 75   Resp: 22   Temp: 98  F (36.7  C)   SpO2: 98%   Weight: 59.1 kg (130 lb 3.2 oz)   Height: 1.499 m (4' 11\")     Body mass index is 26.3 kg/m .    /52   Pulse 75   Temp 98  F (36.7  C)   Resp 22   Ht 1.499 m (4' 11\")   Wt 59.1 kg (130 lb 3.2 oz)   LMP 2020   SpO2 98%   BMI 26.30 kg/m     GENERAL:  Pleasant pregnant female, alert, well groomed.  SKIN:  Warm and dry, without lesions or rashes   EYES:  PERRLA, EOM intact   MOUTH:  Buccal mucosa pink, moist without lesions.    NECK:  Thyroid without enlargement and nodules.  No cervical lymphadenopathy.   LUNGS:  Clear to auscultation.   BREAST:  Symmetrical. No masses noted. No skin or nipple changes or axillary nodes.   HEART:  RRR without murmur.   ABDOMEN: Soft without masses , tenderness or organomegaly.  No CVA tenderness. No scars noted.. Fundus palpable above symphysis pubis.  MUSCULOSKELETAL:  Full range of motion.   EXTREMITIES:  No edema. No significant varicosities.   GENITALIA:  Normal appearing anatomy, no lesions noted.  VAGINA:  Pink, normal rugae and discharge normal and physiologic,  CERVIX:  smooth, without discharge and parous os; firm, closed    Collected samples for: Chlamydia/GC, wet prep, PAP smear      Prenatal labs:   -   Hemoglobin   Date Value Ref Range Status   2019 15.3 12.0 - 16.0 g/dL Final         Assessment and Plan     (Z34.82) Encounter for supervision of other normal pregnancy, second trimester  (primary encounter diagnosis)    Sydney Dao is a 30 year old  , Patient's last menstrual period was 2020. based on US done on 2020 (when she was 14w5d) today she is 16w4d with ADALI 2021. She presents today " for her first prenatal visit. (However according [facility] 2020 she is 17w1d ADALI 2020). No concerns today    Plan:  - Prenatal labs reviewed:   Urine Culture ([facility]), CBC w/ Diff and Plt        ([facility]), Hepatitis B Surface Ag         ([facility]), ABO/Rh Type-HML ([facility]), HIV        Ag/Ab Screen [place] Log ([facility]), Hepatitis C         Antibody ([facility]), Rubella  IgG         ([facility]), Syphilis Screen [place]         (RPR/VDRL) ([facility]), GYN Cytology (pap)         ([facility]), Chlamydia/Gono Amplified         ([facility]), Wet Prep ([facility]), CANCELED:         Urine Microscopic ([facility]), CANCELED: Wet Prep         ([facility])         - Discussed quad screen - patient will think about; I will ask at the next visit .   - Discussed and ordered ultrasound for fetal survey.   - Discussed screening for gestational diabetes at 24-28 weeks.  - Provided counseling regarding  labor symptoms, depression and social support systems, breast feeding, contraception options after delivery.   -The patient plans to deliver at [facility] with myself.   - Patient will continue taking prenatal vitamins and avoiding cigarettes & alcohol.   - Return to clinic in 10/29/2020.    Options for treatment and follow-up care were reviewed with the patient and/or guardian. Pt and/or guardian engaged in the decision making process and verbalized understanding of the options discussed and agreed with the final plan.    Precepted today with: MD Cece Castle MD, MPH (PGY 3)  [facility] Family Medicine Resident  Pager: [phone]

## 2020-10-01 LAB
ABO + RH BLD: NORMAL
C TRACH RRNA CVX QL NAA+PROBE: NEGATIVE
CULTURE: NORMAL
HBV SURFACE AG SERPL QL IA: NEGATIVE
HCV AB SER QL: NEGATIVE
N GONORRHOEA RRNA SPEC QL NAA+PROBE: NEGATIVE
REPEAT ABO/RH TYPING (HML): NORMAL
RUBV IGG SERPL QL IA: POSITIVE
TREPONEMA ANTIBODY (SYPHILIS): NEGATIVE

## 2020-10-02 LAB
FINAL DIAGNOSIS: NORMAL
HPV 16 DNA: NEGATIVE
HPV 18 DNA: NEGATIVE
HPV SOURCE: NORMAL
OTHER HR HPV: NEGATIVE
SPECIMEN DESCRIPTION: NORMAL

## 2020-10-07 ENCOUNTER — RECORDS - HEALTHEAST (OUTPATIENT)
Dept: ADMINISTRATIVE | Facility: OTHER | Age: 30
End: 2020-10-07

## 2020-10-07 LAB
CYTOLOGY CVX/VAG DOC THIN PREP: NORMAL
HIGH RISK?: NO
HPV REFLEX?: NORMAL
LAB AP ABNORMAL BLEEDING: NO
LAB AP BIRTH CONTROL/HORMONES: NORMAL
LAB AP CERVICAL APPEARANCE: NORMAL
LAB AP PATIENT STATUS: NORMAL
LAB AP PREVIOUS ABNL: NORMAL
LAB AP PREVIOUS NORMAL: 2017
LAST MENS PERIOD: NORMAL
PATH REPORT.COMMENTS IMP SPEC: NORMAL
PATH REPORT.COMMENTS IMP SPEC: NORMAL
SPECIMEN ADEQUACY:: NORMAL

## 2020-10-16 ENCOUNTER — COMMUNICATION - HEALTHEAST (OUTPATIENT)
Dept: SCHEDULING | Facility: CLINIC | Age: 30
End: 2020-10-16

## 2020-10-29 ENCOUNTER — OFFICE VISIT (OUTPATIENT)
Dept: FAMILY MEDICINE | Facility: CLINIC | Age: 30
End: 2020-10-29
Payer: COMMERCIAL

## 2020-10-29 VITALS
WEIGHT: 133 LBS | HEIGHT: 59 IN | RESPIRATION RATE: 16 BRPM | DIASTOLIC BLOOD PRESSURE: 62 MMHG | BODY MASS INDEX: 26.81 KG/M2 | TEMPERATURE: 98.2 F | OXYGEN SATURATION: 99 % | HEART RATE: 72 BPM | SYSTOLIC BLOOD PRESSURE: 99 MMHG

## 2020-10-29 DIAGNOSIS — Z23 NEED FOR PROPHYLACTIC VACCINATION AND INOCULATION AGAINST INFLUENZA: ICD-10-CM

## 2020-10-29 DIAGNOSIS — Z34.92 ENCOUNTER FOR PREGNANCY RELATED EXAMINATION IN SECOND TRIMESTER: Primary | ICD-10-CM

## 2020-10-29 PROCEDURE — 99207 PR PRENATAL VISIT: CPT | Mod: GC | Performed by: STUDENT IN AN ORGANIZED HEALTH CARE EDUCATION/TRAINING PROGRAM

## 2020-10-29 PROCEDURE — 90686 IIV4 VACC NO PRSV 0.5 ML IM: CPT | Performed by: STUDENT IN AN ORGANIZED HEALTH CARE EDUCATION/TRAINING PROGRAM

## 2020-10-29 PROCEDURE — 90471 IMMUNIZATION ADMIN: CPT | Performed by: STUDENT IN AN ORGANIZED HEALTH CARE EDUCATION/TRAINING PROGRAM

## 2020-10-29 ASSESSMENT — MIFFLIN-ST. JEOR: SCORE: 1223.91

## 2020-10-29 NOTE — PATIENT INSTRUCTIONS
[facility] Information  If you have any further concerns or wish to schedule another appointment, please call our office at [phone] during normal business hours (8-5, M-F).     For uncomplicated pregnancies, you will be seeing your doctor once a month until you are 28 weeks, then you will see your doctor twice a month. You will begin weekly visits at 36 weeks until you deliver.     If you have urgent medical questions that cannot wait, you may call [phone] at any time of day. Call your doctor if you experience any bleeding or abdominal cramping early in pregnancy.     If you have a medical emergency, please call [phone].    When to call or come in to the hospital    If you have any bleeding or leakage of fluids.     If you have lower abdominal/uterine cramping not relieved with rest and fluids.    If you have a headache not resolved with tylenol, right upper abdominal pain, or sudden onset of swelling.    You know your body best. Never hesitate to call or go to the hospital if something doesn't feel right!    Fetal Survey Ultrasound  We will arrange for an ultrasound around 22 weeks gestation. These are done in clinic on  and . Many families look forward to this visit as a chance to try and determine the babies gender - however, it is an important exam to ensure that baby is developing and growing normally!    Consider childbirth education classes  Childbirth classes are a great way to learn what to expect from the remainder of pregnancy, tips for preparing and handeling the stresses of labor, and learning more about your . Classes are also great for learning more about breastfeeding! Discuss options for classes with your doctor if you are interested.

## 2020-10-29 NOTE — NURSING NOTE
Due to patient being non-English speaking/uses sign language, an  was used for this visit. Only for face-to-face interpretation by an external agency, date and length of interpretation can be found on the scanned worksheet.     name: gm   Agency: [employer]  Language: May   Telephone number: [phone]  Type of interpretation: Telephone, spoken

## 2020-10-29 NOTE — PROGRESS NOTES
"Sydney Dao is a 30 year old  female who presents to clinic for a follow up OB visit. She is currently 20w5d with an estimated date of delivery of Mar 13, 2021 via early US. She denies headache, chest pain, shortness of breath, abdominal pain/contractions, vaginal bleeding, or abnormal discharge. She has felt baby move.     New concerns today:   - Still having nausea every morning.   - Has never had GDM so early GDM screen not indicated.   - Planning to breast feed and bottle feed.   - Would like a natural birth as with her other children. All previous births were vaginal delivery.  - She does not want more children. Her  might want more children. Encouraged them to discuss further and explore contraception that would fit their plans.   - Taking prenatal vitamin.     OB History    Para Term  AB Living   4 3 3 0 0 3   SAB TAB Ectopic Multiple Live Births   0 0 0 0 3      # Outcome Date GA Lbr Ajay/2nd Weight Sex Delivery Anes PTL Lv   4 Current            3 Term 01/10/19 39w5d  3.033 kg (6 lb 11 oz) M  None N BARBIE   2 Term 14 39w2d  2.948 kg (6 lb 8 oz) F  None N BARBIE      Birth Comments: \"Intestinal problems for baby requiring surgery\"   1 Term 13 38w2d 09:00 2.268 kg (5 lb) F Outside Hosp None N BARBIE      Birth Comments: had ctx, SROM,no problems during preg      Name: True       Physical exam:  BP 99/62 (BP Location: Right arm)   Pulse 72   Temp 98.2  F (36.8  C) (Oral)   Resp 16   Ht 1.499 m (4' 11\")   Wt 60.3 kg (133 lb)   LMP 2020   SpO2 99%   BMI 26.86 kg/m      General: alert, female in no acute distress  Abdomen: gravid uterus appropriate for gestation age above pubic symphysis, non-tender, FHTs 150. Measuring 21 cm.  Extremities: no edema    Assessment/Plan:  Patient Active Problem List   Diagnosis     Screening for cervical cancer     Bilateral carpal tunnel syndrome     Numbness and tingling of both feet       (Z34.92) Encounter for pregnancy " related examination in second trimester  (primary encounter diagnosis): Discussed strategies for daily nausea, including eating crackers before sitting up and waiting to brush teeth. Fetal anatomy ultrasound planned for 22 weeks- patient will schedule appointment. Discussed expectations of second trimester and when to call/come in. Continued encouragement of breastfeeding.    Follow up in 4 weeks for routine prenatal visit and fetal survey.      Lorelei Sampson, MS3    The medical student acted as a scribe and the encounter documented above was performed completely by me and the documentation accurately reflects the work I have performed today. MD Leyda Ashley MD   [facility] Resident   Pager: [phone]     Precepted with: Dr. Mike

## 2020-11-12 ENCOUNTER — ANCILLARY PROCEDURE (OUTPATIENT)
Dept: ULTRASOUND IMAGING | Facility: CLINIC | Age: 30
End: 2020-11-12
Attending: FAMILY MEDICINE
Payer: COMMERCIAL

## 2020-11-12 DIAGNOSIS — Z34.92 ENCOUNTER FOR PREGNANCY RELATED EXAMINATION IN SECOND TRIMESTER: ICD-10-CM

## 2020-11-12 NOTE — NURSING NOTE
Due to patient being non-English speaking/uses sign language, an  was used for this visit. Only for face-to-face interpretation by an external agency, date and length of interpretation can be found on the scanned worksheet.     name: Uzma Yuan  Agency: [employer]  Language: Lexyong   Telephone number: [phone]  Type of interpretation: Telephone, spoken

## 2020-11-20 NOTE — RESULT ENCOUNTER NOTE
Please send the following in a letter to the patient:   Demetri Grimes,   Ultrasound looks good. Baby growing well, placenta looks normal.   - Leyda Granados MD

## 2020-11-27 ENCOUNTER — OFFICE VISIT (OUTPATIENT)
Dept: FAMILY MEDICINE | Facility: CLINIC | Age: 30
End: 2020-11-27
Payer: COMMERCIAL

## 2020-11-27 VITALS
RESPIRATION RATE: 16 BRPM | OXYGEN SATURATION: 95 % | WEIGHT: 135 LBS | DIASTOLIC BLOOD PRESSURE: 72 MMHG | BODY MASS INDEX: 27.21 KG/M2 | HEART RATE: 74 BPM | SYSTOLIC BLOOD PRESSURE: 112 MMHG | HEIGHT: 59 IN

## 2020-11-27 DIAGNOSIS — Z34.82 ENCOUNTER FOR SUPERVISION OF OTHER NORMAL PREGNANCY, SECOND TRIMESTER: Primary | ICD-10-CM

## 2020-11-27 LAB
% GRANULOCYTES: 78.1 %G (ref 40–75)
GLU GEST SCREEN 1HR 50G: 92 (ref 0–129)
GRANULOCYTES #: 7.2 K/UL (ref 1.6–8.3)
HCT VFR BLD AUTO: 45.5 % (ref 35–47)
HEMOGLOBIN: 14 G/DL (ref 11.7–15.7)
LYMPHOCYTES # BLD AUTO: 1.6 K/UL (ref 0.8–5.3)
LYMPHOCYTES NFR BLD AUTO: 17.1 %L (ref 20–48)
MCH RBC QN AUTO: 29.9 PG (ref 26.5–35)
MCHC RBC AUTO-ENTMCNC: 30.8 G/DL (ref 32–36)
MCV RBC AUTO: 97.1 FL (ref 78–100)
MID #: 0.4 K/UL (ref 0–2.2)
MID %: 4.8 %M (ref 0–20)
PLATELET # BLD AUTO: 341 K/UL (ref 150–450)
RBC # BLD AUTO: 4.69 M/UL (ref 3.8–5.2)
WBC # BLD AUTO: 9.2 K/UL (ref 4–11)

## 2020-11-27 PROCEDURE — 99207 PR PRENATAL VISIT: CPT | Mod: GC | Performed by: STUDENT IN AN ORGANIZED HEALTH CARE EDUCATION/TRAINING PROGRAM

## 2020-11-27 PROCEDURE — 36415 COLL VENOUS BLD VENIPUNCTURE: CPT | Performed by: STUDENT IN AN ORGANIZED HEALTH CARE EDUCATION/TRAINING PROGRAM

## 2020-11-27 PROCEDURE — 85025 COMPLETE CBC W/AUTO DIFF WBC: CPT | Performed by: STUDENT IN AN ORGANIZED HEALTH CARE EDUCATION/TRAINING PROGRAM

## 2020-11-27 PROCEDURE — 82950 GLUCOSE TEST: CPT | Performed by: STUDENT IN AN ORGANIZED HEALTH CARE EDUCATION/TRAINING PROGRAM

## 2020-11-27 ASSESSMENT — MIFFLIN-ST. JEOR: SCORE: 1236.99

## 2020-11-27 NOTE — PROGRESS NOTES
"Sydney Dao is a 30 year old  female who presents to clinic for a follow up OB visit. She is currently 24w6d with an estimated date of delivery of Mar 13, 2021 via vaginal delivery. She denies headache, chest pain, shortness of breath, abdominal pain/contractions, vaginal bleeding, or abnormal discharge. She has felt baby move.     New concerns today:   -No new concerns    OB History    Para Term  AB Living   4 3 3 0 0 3   SAB TAB Ectopic Multiple Live Births   0 0 0 0 3      # Outcome Date GA Lbr Ajay/2nd Weight Sex Delivery Anes PTL Lv   4 Current            3 Term 01/10/19 39w5d  3.033 kg (6 lb 11 oz) M  None N BARBIE   2 Term 14 39w2d  2.948 kg (6 lb 8 oz) F  None N BARBIE      Birth Comments: \"Intestinal problems for baby requiring surgery\"   1 Term 13 38w2d 09:00 2.268 kg (5 lb) F Outside Hosp None N BARBIE      Birth Comments: had ctx, SROM,no problems during preg      Name: True       Physical exam:  /72 (BP Location: Left arm)   Pulse 74   Resp 16   Ht 1.505 m (4' 11.25\")   Wt 61.2 kg (135 lb)   LMP 2020   SpO2 95%   BMI 27.04 kg/m      General: alert, female in no acute distress  Abdomen: gravid uterus appropriate for gestation age at 23 cm above pubic symphysis, non-tender, FHTs normal with rate of 130.  Extremities: no edema    Office Visit on 2020   Component Date Value Ref Range Status     Glu Gest Screen 1hr 50g 2020 92  0 - 129 Final     WBC 2020 9.2  4.0 - 11.0 K/uL Final     Lymphocytes # 2020 1.6  0.8 - 5.3 K/uL Final     % Lymphocytes 2020 17.1* 20.0 - 48.0 %L Final     Mid # 2020 0.4  0.0 - 2.2 K/uL Final     Mid % 2020 4.8  0.0 - 20.0 %M Final     GRANULOCYTES # 2020 7.2  1.6 - 8.3 K/uL Final     % Granulocytes 2020 78.1* 40.0 - 75.0 %G Final     RBC 2020 4.69  3.80 - 5.20 M/uL Final     Hemoglobin 2020 14.0  11.7 - 15.7 g/dL Final     Hematocrit 2020 45.5  35.0 - 47.0 " % Final     MCV 2020 97.1  78.0 - 100.0 fL Final     MCH 2020 29.9  26.5 - 35.0 pg Final     MCHC 2020 30.8* 32.0 - 36.0 g/dL Final     Platelets 2020 341.0  150.0 - 450.0 K/uL Final     Assessment/Plan:  Patient Active Problem List   Diagnosis     Screening for cervical cancer     Bilateral carpal tunnel syndrome     Numbness and tingling of both feet     Encounter for supervision of other normal pregnancy, second trimester     Fetal survey ultrasound done 2020.    Information given on gestational diabetes. 1 hour glucose tolerance test today, in addition to CBC and syphilis. Passed 1 hr GTT. CBC WNL.     Blood type O pos. Rhogam not indicated.    Discussed signs and symptoms of  labor.   Evaluate for decreased fetal movement.     Follow up in 4 weeks for routine prenatal visit.     Roberto Hernandez, MS3    I was present with the medical student who participated in the service and in the documentation of this note. I have verified the history and personally performed the physical exam and medical decision making, and have verified the content of the note, which accurately reflects my assessment of the patient and the plan of care.    Vi Menjivar MD  [facility] Resident    Precepted with: Herminia Spain DO

## 2020-11-27 NOTE — PATIENT INSTRUCTIONS
[facility] Information  If you have any further concerns or wish to schedule another appointment, please call our office at [phone] during normal business hours (8-5, M-F).     For uncomplicated pregnancies, you will be seeing your doctor once a month until you are 28 weeks, then you will see your doctor twice a month. You will begin weekly visits at 36 weeks until you deliver.     If you have urgent medical questions that cannot wait, you may call [phone] at any time of day.     If you have a medical emergency, please call [phone].    When to call or come in to the hospital    If you notice a decrease in your baby's movement.     If your begin to experience contractions that are 5 minutes or less apart and lasting for over 45 seconds, or if contractions are becoming more painful.    If you have any bleeding or leakage of fluids.     If you have a headache not resolved with tylenol, right upper abdominal pain, or sudden onset of swelling.    You know your body best. Never hesitate to call or go to the hospital if something doesn't feel right!    [facility]   Address: [address] [place] [address]. [place] [address]   Phone: [phone]  Blood Glucose Screening During Pregnancy   Gestational diabetes is diabetes that only pregnant women get. Changes in your body during pregnancy can cause high blood sugar (glucose). This can cause problems for you and your baby. It is a serious condition, but it can be controlled.  What to Know If You Test Positive     Gestational diabetes is treatable. The best way to control gestational diabetes is to find out you have it as early as possible and start treatment quickly.     Gestational diabetes can cause problems for the mother during pregnancy. It can also cause problems with the baby during pregnancy, delivery, and after. Treatment greatly lowers the chance for problems.     The changes in your body that cause gestational diabetes normally occur only when  you are pregnant. After the baby is born, your body goes back to normal and the condition goes away. You may be more likely to have type 2 diabetes later, though. So talk to your doctor about ways to help prevent type 2 diabetes.  Treating Gestational Diabetes     You ll need to check your blood sugar regularly. You can do this at home by pricking your finger and checking a drop of blood on a glucose monitor. Your health care provider will show you how and when to check your blood sugar and discuss your target blood sugar level.     To manage your blood sugar, you will be given a special plan. It will likely involve planning your meals and getting regular exercise. Some women need to take a hormone called insulin, or an oral hypoglycemic medication to help control their blood sugar.    Making Plans for Feeding Your Baby  By this point, you probably have read a lot about feeding your baby. Breastfeed or formula? Each mother s decision is her own and [facility] respects you and your choices. We ve gathered information on both breastfeeding and formula feeding to help with your decision. Talking with your physician or nurse-midwife can also help in your decision. However you plan to feed your baby, [facility] encourage rooming in with your baby, skin-to-skin contact and feeding your baby based on his or her cues.    Skin-to-skin contact  Being close to mom helps your baby adjust to life outside of the womb. It helps your baby regulate their body temperature, heart rate, and breathing. Your baby will usually be placed skin-to-skin immediately following birth or as soon as possible, if medical intervention is needed.    Rooming-In  Having your baby stay with you in your room is called  rooming-in.  Keeping your baby in your room helps you to learn how to care for your baby by getting to know your baby s cues, body rhythms and sleep cycle.       Cue-based feeding  Cues (signals) are baby s way of  telling you what he or she wants. When you learn your infant s cues, you know how to care for and feed your baby. Feeding cues are the licking and smacking of lips, bringing their fist to their mouth, and a reflex called  rooting - where baby turns and opens his or her mouth, searching for the breast or bottle. Crying is a late feeding cue. Babies can feed frequently, often at least 8 times in 24 hours.  Breastfeeding facts  Breast milk is the best source of nutrition for your baby and is available at birth. In the first couple of days, your milk volume is already starting to increase, though it may not be noticeable. Breastfeed frequently to increase your milk supply. Within three to five days, you will begin to notice larger milk volumes. An increase in breast size, heaviness and firmness are often described as the milk  coming in.  Frequent breastfeeding can help breasts from getting overly firm and painful. You will know the baby is getting enough milk if your baby is having wet and dirty diapers and gaining weight.     If your goal is to exclusively breastfeed, it is important to not use any formula or artificial nipples (including bottles and pacifiers) while your baby is learning to breastfeed. While it may seem like an  easy  option to give your baby a bottle, formula should only be given if there is a medical reason for your baby to have it.    Positioning and attachment   Get comfortable. Use pillows as needed to support your arms and baby. Hold baby close at the level of your breast, facing you in a tummy to tummy position. Skin to skin helps with this. Position the baby with his or her nose by the nipple. There should be a straight line from baby s ear to shoulder to hips.  Tickle your baby s lips or wait for baby to open mouth wide, bring baby to breast by leading with the chin. Aim the nipple at the roof of baby s mouth. A rapid sucking pattern is followed by longer, drawing pattern with occasional  swallows heard. When baby is correctly latched, your nipple and much of the areola are pulled well into baby s mouth.      Returning to work or school  Focus on a good start to breastfeeding. Many women continue to provide breastmilk for their baby when they return to work or school. Making plans about where to pump and store milk can make the transition go well. Talk with other mothers who have also returned to work or school for tips and support. Your employer s Human Resource department may be a resource as well.        babies can mean fewer  sick  days for you.    A quality breast pump will also save time and add comfort. Check with your insurance prior to giving birth for breast pump coverage. Many insurance companies include a pump within your benefits.    Wait until your baby is at least three weeks old to introduce a bottle for the first time. Have someone besides you give the bottle.    Breastfeed when you are with your baby. Reserve your bottles of breast milk for when you are away.     Your breasts will need to be  emptied  either by your baby or a pump. Plan to pump at least twice in an eight hour day.    If you cannot pump at work, continue breastfeeding at home. Any amount of breast milk is worth giving to your baby.    Formula feeding facts  If you are planning to use formula to feed your baby, you will want to make some preparations ahead of time. Talk to your doctor or nurse-midwife about what type of formula to use. Some are iron-fortified, meaning they have extra iron in them. You will want to purchase formula and bottles before your baby is born to be sure you are ready after you return from the hospital. [facility] do not provide formula samples to take home.    Be sure to follow formula mixing directions closely. Regular milk in the dairy case at the grocery store should not be given to babies under 1 year old. Baby formula is sold in several forms  including:    Ready-to-use. This is the most expensive, but no mixing is necessary.    Concentrated liquid. This is less expensive than ready-to-use and you mix with water.    Powder. This is the least expensive. You mix one level scoop of powdered formula with two ounces of water and stir well.    Most babies need 2.5 ounces of formula per pound of body weight each day. This means an 8-pound baby may drink about 20 ounces of formula a day; however, this is just an estimate. The most important thing is to pay attention to your baby s cues. If your baby is always fussy, needs more iron or has certain food allergies, your physician may suggest you change your baby s formula to a different kind.     How do I warm my baby s bottles?  You may feed your baby a bottle without warming it first. It is OK for the breast milk or formula to be cool or room temperature. If your baby seems to prefer it warmed, you can put the filled bottle in a container of warm water and let it stand for a few minutes. Check the temperature of the liquid on your skin before feeding it to your baby; to be sure it isn t too hot. Do not heat bottles in the microwave. Microwaves heat food and liquids unevenly, and this can cause hot spots that can burn your baby.    How do I clean and sterilize bottles?  Sterilize bottles and nipples before you use them for the first time. You can do this by putting them in boiling water for 5 minutes. After that first time, you can wash them in hot and soapy water. Rinse them carefully to be sure there is no soap left on them. You can also wash them in the .    [facility] [phone] ([phone])

## 2020-11-27 NOTE — NURSING NOTE
Due to patient being non-English speaking/uses sign language, an  was used for this visit. Only for face-to-face interpretation by an external agency, date and length of interpretation can be found on the scanned worksheet.     name: greg hoffmann  Agency: [employer]  Language: May   Telephone number: [phone]  Type of interpretation: Telephone, spoken

## 2020-11-27 NOTE — LETTER
November 30, 2020      Sydney Dao  [address] [place] [address]        Dear ,    Thank you for visiting our clinic on Nov 27, 2020.     The result of your recent labwork has returned. You passed your 1 hour glucose test. Your syphilis test was negative.  Your hemoglobin was normal (14.0).     If you have any questions or concerns, please feel free to give us a call.     Resulted Orders   Glucose Challenge  1 Hr  ([facility])   Result Value Ref Range    Glu Gest Screen 1hr 50g 92 0 - 129    Narrative    Verbally informed patient of passing results in clinic.  LK, CMA   CBC with Diff Plt ([facility])   Result Value Ref Range    WBC 9.2 4.0 - 11.0 K/uL    Lymphocytes # 1.6 0.8 - 5.3 K/uL    % Lymphocytes 17.1 (L) 20.0 - 48.0 %L    Mid # 0.4 0.0 - 2.2 K/uL    Mid % 4.8 0.0 - 20.0 %M    GRANULOCYTES # 7.2 1.6 - 8.3 K/uL    % Granulocytes 78.1 (H) 40.0 - 75.0 %G    RBC 4.69 3.80 - 5.20 M/uL    Hemoglobin 14.0 11.7 - 15.7 g/dL    Hematocrit 45.5 35.0 - 47.0 %    MCV 97.1 78.0 - 100.0 fL    MCH 29.9 26.5 - 35.0 pg    MCHC 30.8 (L) 32.0 - 36.0 g/dL    Platelets 341.0 150.0 - 450.0 K/uL   Syphilis Screen [place] (RPR/VDRL) ([facility])   Result Value Ref Range    Treponema Antibody (Syphilis) Negative Negative    Narrative    Test performed by:  [facility]  [address] [place] [address]       If you have any questions or concerns, please call the clinic at the number listed above.       Sincerely,      Herminia Spain, DO

## 2020-11-28 LAB — TREPONEMA ANTIBODY (SYPHILIS): NEGATIVE

## 2020-12-09 NOTE — PROGRESS NOTES
Preceptor Attestation:   Patient seen, evaluated and discussed with the resident. I have verified the content of the note, which accurately reflects my assessment of the patient and the plan of care.  Supervising Physician:Herminia Spain DO [facility]

## 2021-01-06 ENCOUNTER — OFFICE VISIT (OUTPATIENT)
Dept: FAMILY MEDICINE | Facility: CLINIC | Age: 31
End: 2021-01-06
Payer: COMMERCIAL

## 2021-01-06 VITALS
RESPIRATION RATE: 20 BRPM | BODY MASS INDEX: 28.51 KG/M2 | SYSTOLIC BLOOD PRESSURE: 105 MMHG | HEART RATE: 77 BPM | HEIGHT: 59 IN | TEMPERATURE: 97.9 F | DIASTOLIC BLOOD PRESSURE: 70 MMHG | WEIGHT: 141.4 LBS | OXYGEN SATURATION: 99 %

## 2021-01-06 DIAGNOSIS — Z34.83 ENCOUNTER FOR SUPERVISION OF OTHER NORMAL PREGNANCY, THIRD TRIMESTER: Primary | ICD-10-CM

## 2021-01-06 PROCEDURE — 90471 IMMUNIZATION ADMIN: CPT | Performed by: STUDENT IN AN ORGANIZED HEALTH CARE EDUCATION/TRAINING PROGRAM

## 2021-01-06 PROCEDURE — 90715 TDAP VACCINE 7 YRS/> IM: CPT | Performed by: STUDENT IN AN ORGANIZED HEALTH CARE EDUCATION/TRAINING PROGRAM

## 2021-01-06 PROCEDURE — 99207 PR PRENATAL VISIT: CPT | Mod: GC | Performed by: STUDENT IN AN ORGANIZED HEALTH CARE EDUCATION/TRAINING PROGRAM

## 2021-01-06 ASSESSMENT — MIFFLIN-ST. JEOR: SCORE: 1269.14

## 2021-01-06 NOTE — PROGRESS NOTES
"           HPI:       Sydney Dao is a 30 year old  female who presents to clinic for a follow up OB visit.     She is currently 30w4d with an estimated date of delivery of Mar 13, 2021 via first US.        Chief Complaint   Patient presents with     Prenatal Care     Medication Reconciliation     Completed      Abdominal Pain     Last Saturday was experiencing stomach pain, when pressure is applied to baby especially. Feels \"tight\"       Concerns today: none - reports abdominal pain from Saturday has resolved. She suspects its due doing laundry and carrying baskets over the weekend  -Patient reports no vaginal bleeding, no contractions/severe cramping, no leakage of fluid.   -Fetal movement is present.  - Planning to breast feed and bottle feed.   - Would like a natural birth as with her other children. All previous births were vaginal delivery.  - She does not want more children. Her  might want more children. Encouraged them to discuss further and explore contraception that would fit their plans.   - Taking prenatal vitamin.  -No heartburn, vaginal discharge, dysuria, headache, vision changes, lower extremity swelling, upper abdominal pain, chest pain, shortness of breath.     Medication Reconciliation completed    A [place]  was used for this visit           Review of Systems:     C: NEGATIVE for fatigue, unexpected change in weight  E: NEGATIVE for acute vision problems or changes  R: NEGATIVE for significant cough or shortness of breath  CV: NEGATIVE for chest pain, palpitations or new or worsening peripheral edema  P: NEGATIVE for changes in mood or affect       Allergies   Allergen Reactions     No Known Drug Allergy             PMHX:     Patient Active Problem List   Diagnosis     Screening for cervical cancer     Bilateral carpal tunnel syndrome     Numbness and tingling of both feet     Encounter for supervision of other normal pregnancy in third trimester       OB History " "   Para Term  AB Living   4 3 3 0 0 3   SAB TAB Ectopic Multiple Live Births   0 0 0 0 3      # Outcome Date GA Lbr Ajay/2nd Weight Sex Delivery Anes PTL Lv   4 Current            3 Term 01/10/19 39w5d  3.033 kg (6 lb 11 oz) M  None N BARBIE   2 Term 14 39w2d  2.948 kg (6 lb 8 oz) F  None N BARBIE      Birth Comments: \"Intestinal problems for baby requiring surgery\"   1 Term 13 38w2d 09:00 2.268 kg (5 lb) F Outside Hosp None N BARBIE      Birth Comments: had ctx, SROM,no problems during preg      Name: True       No past medical history on file.    Current Outpatient Medications   Medication Sig Dispense Refill     Prenatal Vit-Fe Fumarate-FA (PRENATAL MULTIVITAMIN W/IRON) 27-0.8 MG tablet Take 1 tablet by mouth daily 90 tablet 3       No results found for this or any previous visit (from the past 24 hour(s)).              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   Social Needs     Financial resource strain: None     Food insecurity     Worry: None     Inability: None     Transportation needs     Medical: None     Non-medical: None   Tobacco Use     Smoking status: Never Smoker     Smokeless tobacco: Never Used   Substance and Sexual Activity     Alcohol use: No     Drug use: No     Sexual activity: Yes     Partners: Male   Lifestyle     Physical activity     Days per week: None     Minutes per session: None     Stress: None   Relationships     Social connections     Talks on phone: None     Gets together: None     Attends [employer] service: None     Active member of club or organization: None     Attends meetings of clubs or organizations: None     Relationship status: None     Intimate partner violence     Fear of current or ex partner: None     " "Emotionally abused: None     Physically abused: None     Forced sexual activity: None   Other Topics Concern     Parent/sibling w/ CABG, MI or angioplasty before 65F 55M? Not Asked   Social History Narrative     None     Have you used any tobacco products, alcohol or any other drugs during this pregnancy before or after your knew you were pregnant? none         Physical Exam:     Vitals:    21 0813   BP: 105/70   BP Location: Right arm   Patient Position: Sitting   Cuff Size: Adult Regular   Pulse: 77   Resp: 20   Temp: 97.9  F (36.6  C)   TempSrc: Oral   SpO2: 99%   Weight: 64.1 kg (141 lb 6.4 oz)   Height: 1.51 m (4' 11.45\")     Body mass index is 28.13 kg/m .    /70 (BP Location: Right arm, Patient Position: Sitting, Cuff Size: Adult Regular)   Pulse 77   Temp 97.9  F (36.6  C) (Oral)   Resp 20   Ht 1.51 m (4' 11.45\")   Wt 64.1 kg (141 lb 6.4 oz)   LMP 2020   SpO2 99%   BMI 28.13 kg/m     GENERAL:  Pleasant pregnant female, alert, well groomed.  SKIN:  Warm and dry, without lesions or rashes   LUNGS:  Clear to auscultation.   HEART:  RRR without murmur.   ABDOMEN: Soft without masses , tenderness or organomegaly.  No CVA tenderness. No scars noted.. Fundus palpable above symphysis pubis ~30cm. FHT 140s  MUSCULOSKELETAL:  Full range of motion.   EXTREMITIES:  No edema. No significant varicosities.       Prenatal labs:   -   Hemoglobin   Date Value Ref Range Status   2020 14.0 11.7 - 15.7 g/dL Final         Assessment and Plan     (Z34.83) Encounter for supervision of other normal pregnancy, third trimester  (primary encounter diagnosis)  Comment: Sydney Dao is a 30 year old  female who presents to clinic for a follow up OB visit. She is currently 30w4d with an estimated date of delivery of Mar 13, 2021 via first US. No new concerns today      Plan:   -Provided counseling regarding  labor symptoms, depression and social support systems, breast feeding, contraception " options after delivery. The patient plans to deliver at [facility] with myself.   -Patient will continue taking prenatal vitamins and avoiding cigarettes & alcohol.   -Follow up for routine prenatal visit on 01/22/2021    Options for treatment and follow-up care were reviewed with the patient and/or guardian. Pt and/or guardian engaged in the decision making process and verbalized understanding of the options discussed and agreed with the final plan.    Precepted today with: MD Cece aCstle MD, MPH (PGY 3)  [facility] Family Medicine Resident  Pager: [phone]

## 2021-01-06 NOTE — NURSING NOTE
Due to patient being non-English speaking/uses sign language, an  was used for this visit. Only for face-to-face interpretation by an external agency, date and length of interpretation can be found on the scanned worksheet.     name: Va   Agency: [employer]  Language: May   Telephone number:   Type of interpretation: Telephone, spoken

## 2021-01-06 NOTE — PATIENT INSTRUCTIONS
[facility] Information- FAX NUMBER: [phone]  If you have any further concerns or wish to schedule another appointment, please call our office at [phone] during normal business hours (8-5, M-F).      For uncomplicated pregnancies, you will be seeing your doctor once a month until you are 28 weeks, then you will see your doctor twice a month. You will begin weekly visits at 36 weeks until you deliver.      If you have urgent medical questions that cannot wait, you may call [phone] at any time of day.      If you have a medical emergency, please call 911.     When to call or come in to the hospital    If you notice a decrease in your baby's movement.     If your begin to experience contractions that are 5 minutes or less apart and lasting for over 45 seconds, or if contractions are becoming more painful.    If you have any bleeding or leakage of fluids.     If you have a headache not resolved with tylenol, right upper abdominal pain, or sudden onset of swelling.    You know your body best. Never hesitate to call or go to the hospital if something doesn't feel right!    [facility]  1. Address: [address] [place] [address]. [place] [address]  2. Phone: [phone]     Preparing for the hospital  You re likely feeling anxious as your child s birth approaches. This is normal. To give yourself some peace of mind, pack a bag 3-4 weeks before your due date. Here is a list of things to remember:    Personal care items like toothbrush, hair brush, lip balm, lotion, shampoo, glasses, contacts    Nightgown, bathrobe, slippers    Several pairs of underwear    Comfortable clothes for you to wear home    Camera with new batteries    Cell phone and     Insurance information and any other paperwork needed for your hospital stay    Clothes for baby to wear home    An infant, rear-facing car seat for bringing home your baby (this is required by law)    In case of an EMERGENCY or you START  TO FEEL CONTRACTIONS:  Once you start to feel contractions (I.e. increased abdominal discomfort/cramping, with increase frequency) and/or notice gush of water/blood coming from your vagina, PLEASE GO TO [facility] - LABOR AND DELIVERY.

## 2021-01-22 ENCOUNTER — OFFICE VISIT (OUTPATIENT)
Dept: FAMILY MEDICINE | Facility: CLINIC | Age: 31
End: 2021-01-22
Payer: COMMERCIAL

## 2021-01-22 VITALS
RESPIRATION RATE: 20 BRPM | WEIGHT: 145 LBS | BODY MASS INDEX: 28.47 KG/M2 | SYSTOLIC BLOOD PRESSURE: 107 MMHG | HEIGHT: 60 IN | DIASTOLIC BLOOD PRESSURE: 69 MMHG | HEART RATE: 82 BPM | TEMPERATURE: 97.6 F | OXYGEN SATURATION: 98 %

## 2021-01-22 DIAGNOSIS — Z32.01 PREGNANCY TEST POSITIVE: ICD-10-CM

## 2021-01-22 DIAGNOSIS — Z34.83 ENCOUNTER FOR SUPERVISION OF OTHER NORMAL PREGNANCY, THIRD TRIMESTER: Primary | ICD-10-CM

## 2021-01-22 LAB
% IMMATURE GRANULOCYTES: 1 %
ABS IMMATURE GRANULOCYTES: 0.1 THOU/UL
BASOPHILS # BLD AUTO: 0 THOU/UL (ref 0–0.2)
BASOPHILS NFR BLD AUTO: 0 % (ref 0–2)
EOSINOPHIL # BLD AUTO: 0.1 THOU/UL (ref 0–0.4)
EOSINOPHIL NFR BLD AUTO: 1 % (ref 0–6)
ERYTHROCYTE [DISTWIDTH] IN BLOOD BY AUTOMATED COUNT: 12.6 % (ref 11–14.5)
HCT VFR BLD AUTO: 38.9 % (ref 35–47)
HGB BLD-MCNC: 12.5 G/DL (ref 12–16)
LYMPHOCYTES # BLD AUTO: 1.7 THOU/UL (ref 0.8–4.4)
LYMPHOCYTES NFR BLD AUTO: 19 % (ref 20–40)
MCH RBC QN AUTO: 29 PG (ref 27–34)
MCHC RBC AUTO-ENTMCNC: 32.1 G/DL (ref 32–36)
MCV RBC AUTO: 90 FL (ref 80–100)
MONOCYTES # BLD AUTO: 0.7 THOU/UL (ref 0–0.9)
MONOCYTES NFR BLD AUTO: 8 % (ref 2–10)
NEUTROPHILS # BLD AUTO: 6.3 THOU/UL (ref 2–7.7)
NEUTROPHILS NFR BLD AUTO: 71 % (ref 50–70)
PLATELET # BLD AUTO: 366 THOU/UL (ref 140–440)
PMV BLD AUTO: 9.3 FL (ref 8.5–12.5)
RBC # BLD AUTO: 4.31 MILL/UL (ref 3.8–5.4)
WBC # BLD AUTO: 8.8 THOU/UL (ref 4–11)

## 2021-01-22 PROCEDURE — 99207 PR PRENATAL VISIT: CPT | Mod: GC | Performed by: STUDENT IN AN ORGANIZED HEALTH CARE EDUCATION/TRAINING PROGRAM

## 2021-01-22 PROCEDURE — 36415 COLL VENOUS BLD VENIPUNCTURE: CPT | Performed by: STUDENT IN AN ORGANIZED HEALTH CARE EDUCATION/TRAINING PROGRAM

## 2021-01-22 RX ORDER — PRENATAL VIT/IRON FUM/FOLIC AC 27MG-0.8MG
1 TABLET ORAL DAILY
Qty: 90 TABLET | Refills: 3 | Status: SHIPPED | OUTPATIENT
Start: 2021-01-22 | End: 2022-04-08

## 2021-01-22 ASSESSMENT — MIFFLIN-ST. JEOR: SCORE: 1288.6

## 2021-01-22 NOTE — PATIENT INSTRUCTIONS
[facility] Information- FAX NUMBER: [phone]  If you have any further concerns or wish to schedule another appointment, please call our office at [phone] during normal business hours (8-5, M-F).      For uncomplicated pregnancies, you will be seeing your doctor once a month until you are 28 weeks, then you will see your doctor twice a month. You will begin weekly visits at 36 weeks until you deliver.      If you have urgent medical questions that cannot wait, you may call [phone] at any time of day.      If you have a medical emergency, please call 911.     When to call or come in to the hospital    If you notice a decrease in your baby's movement.     If your begin to experience contractions that are 5 minutes or less apart and lasting for over 45 seconds, or if contractions are becoming more painful.    If you have any bleeding or leakage of fluids.     If you have a headache not resolved with tylenol, right upper abdominal pain, or sudden onset of swelling.    You know your body best. Never hesitate to call or go to the hospital if something doesn't feel right!    [facility]  1. Address: [address] [place] [address]. [place] [address]  2. Phone: [phone]     Preparing for the hospital  You re likely feeling anxious as your child s birth approaches. This is normal. To give yourself some peace of mind, pack a bag 3-4 weeks before your due date. Here is a list of things to remember:    Personal care items like toothbrush, hair brush, lip balm, lotion, shampoo, glasses, contacts    Nightgown, bathrobe, slippers    Several pairs of underwear    Comfortable clothes for you to wear home    Camera with new batteries    Cell phone and     Insurance information and any other paperwork needed for your hospital stay    Clothes for baby to wear home    An infant, rear-facing car seat for bringing home your baby (this is required by law)    In case of an EMERGENCY or you START  TO FEEL CONTRACTIONS:  Once you start to feel contractions (I.e. increased abdominal discomfort/cramping, with increase frequency) and/or notice gush of water/blood coming from your vagina, PLEASE GO TO [facility] - LABOR AND DELIVERY.    My Pager sometimes [phone] --> REQUEST THE NURSING STAFF TO CALL ME (DR. JOSE A SHEPPARD) - THEY HAVE MY CELL NUMBER IN THE UNIT.  Jose A Sheppard MD, MPH (PGY 3)  [facility] Family Medicine Resident

## 2021-01-22 NOTE — PROGRESS NOTES
HPI:       Sydney Dao is a 30 year old  female who presents to clinic for a follow up OB visit.      She is currently 32w6d with an estimated date of delivery of Mar 13, 2021 via first .     Chief Complaint   Patient presents with     Prenatal Care     No concerns      Medication Reconciliation     Completed      -No concerns today  -Patient reports no vaginal bleeding, no contractions/severe cramping, no leakage of fluid.   -Fetal movement is present.  - Planning to breast feed and bottle feed.   - Would like a natural birth as with her other children. All previous births were vaginal delivery.  - She does not want more children. Her  might want more children. Encouraged them to discuss further and explore contraception that would fit their plans.   - Taking prenatal vitamin.  -No heartburn, vaginal discharge, dysuria, headache, vision changes, lower extremity swelling, upper abdominal pain, chest pain, shortness of breath.      Medication Reconciliation completed      A via680  was used for this visit           Review of Systems:     C: NEGATIVE for fatigue, unexpected change in weight  E: NEGATIVE for acute vision problems or changes  R: NEGATIVE for significant cough or shortness of breath  CV: NEGATIVE for chest pain, palpitations or new or worsening peripheral edema  P: NEGATIVE for changes in mood or affect       Allergies   Allergen Reactions     No Known Drug Allergy             PMHX:     Patient Active Problem List   Diagnosis     Screening for cervical cancer     Bilateral carpal tunnel syndrome     Numbness and tingling of both feet     Encounter for supervision of other normal pregnancy in third trimester       OB History    Para Term  AB Living   4 3 3 0 0 3   SAB TAB Ectopic Multiple Live Births   0 0 0 0 3      # Outcome Date GA Lbr Ajay/2nd Weight Sex Delivery Anes PTL Lv   4 Current            3 Term 01/10/19 39w5d  3.033 kg (6 lb 11 oz) M   "None N BARBIE   2 Term 14 39w2d  2.948 kg (6 lb 8 oz) F  None N BARBIE      Birth Comments: \"Intestinal problems for baby requiring surgery\"   1 Term 13 38w2d 09:00 2.268 kg (5 lb) F Outside Hosp None N BARBIE      Birth Comments: had ctx, SROM,no problems during preg      Name: True       History reviewed. No pertinent past medical history.    Current Outpatient Medications   Medication Sig Dispense Refill     Prenatal Vit-Fe Fumarate-FA (PRENATAL MULTIVITAMIN W/IRON) 27-0.8 MG tablet Take 1 tablet by mouth daily 90 tablet 3       No results found for this or any previous visit (from the past 24 hour(s)).          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   Social Needs     Financial resource strain: None     Food insecurity     Worry: None     Inability: None     Transportation needs     Medical: None     Non-medical: None   Tobacco Use     Smoking status: Never Smoker     Smokeless tobacco: Never Used   Substance and Sexual Activity     Alcohol use: No     Drug use: No     Sexual activity: Yes     Partners: Male   Lifestyle     Physical activity     Days per week: None     Minutes per session: None     Stress: None   Relationships     Social connections     Talks on phone: None     Gets together: None     Attends [employer] service: None     Active member of club or organization: None     Attends meetings of clubs or organizations: None     Relationship status: None     Intimate partner violence     Fear of current or ex partner: None     Emotionally abused: None     Physically abused: None     Forced sexual activity: None   Other Topics Concern     Parent/sibling w/ CABG, MI or angioplasty before 65F 55M? Not Asked   Social History Narrative " "    None     Have you used any tobacco products, alcohol or any other drugs during this pregnancy before or after your knew you were pregnant? no         Physical Exam:     Vitals:    21 1118   BP: 107/69   BP Location: Right arm   Patient Position: Sitting   Cuff Size: Adult Regular   Pulse: 82   Resp: 20   Temp: 97.6  F (36.4  C)   TempSrc: Oral   SpO2: 98%   Weight: 65.8 kg (145 lb)   Height: 1.515 m (4' 11.65\")     Body mass index is 28.66 kg/m .    /69 (BP Location: Right arm, Patient Position: Sitting, Cuff Size: Adult Regular)   Pulse 82   Temp 97.6  F (36.4  C) (Oral)   Resp 20   Ht 1.515 m (4' 11.65\")   Wt 65.8 kg (145 lb)   LMP 2020   SpO2 98%   BMI 28.66 kg/m     GENERAL:  Pleasant pregnant female, alert, well groomed.  LUNGS:  Clear to auscultation.   HEART:  RRR without murmur.   ABDOMEN: Soft without masses , tenderness or organomegaly.  No CVA tenderness. No scars noted.. Fundus palpable above symphysis pubis ~36cm. FHT 140s  EXTREMITIES:  No edema. No significant varicosities.       Assessment and Plan     (Z34.83) Encounter for supervision of other normal pregnancy, third trimester  (primary encounter diagnosis)  Comment: Sydney Dao is a 30 year old  female who presents to clinic for a follow up OB visit. She is currently 32w6d with an estimated date of delivery of Mar 13, 2021 via first US. No new concerns today      Plan:   -Provided counseling regarding  labor symptoms, depression and social support systems, breast feeding, contraception options after delivery. The patient plans to deliver at [facility] with myself.   -Patient will continue taking prenatal vitamins and avoiding cigarettes & alcohol.   -Follow up for routine prenatal visit on 2021    Options for treatment and follow-up care were reviewed with the patient and/or guardian. Pt and/or guardian engaged in the decision making process and verbalized understanding of the options " discussed and agreed with the final plan.    Precepted today with: MD Cece Banks MD, MPH (PGY 3)  [facility] Family Medicine Resident  Pager: [phone]

## 2021-01-22 NOTE — PROGRESS NOTES
I have reviewed the history and physical examination. I was present for key portions of the visit and agree with the assessment and plan as documented above by Dr. Sheppard for 30 yr old  @ 32w 6d here for prenatal care. FHT/ FH appropriate.  Current issues include None.  /term labor precautions given.  Continue routine prenatal care.     Bryan Saleh MD  2021  12:20 PM

## 2021-01-23 LAB — TREPONEMA ANTIBODY (SYPHILIS): NEGATIVE

## 2021-02-04 ENCOUNTER — OFFICE VISIT (OUTPATIENT)
Dept: FAMILY MEDICINE | Facility: CLINIC | Age: 31
End: 2021-02-04
Payer: COMMERCIAL

## 2021-02-04 VITALS
RESPIRATION RATE: 18 BRPM | WEIGHT: 143.8 LBS | HEART RATE: 88 BPM | SYSTOLIC BLOOD PRESSURE: 104 MMHG | TEMPERATURE: 98.4 F | DIASTOLIC BLOOD PRESSURE: 68 MMHG | OXYGEN SATURATION: 97 % | BODY MASS INDEX: 28.99 KG/M2 | HEIGHT: 59 IN

## 2021-02-04 DIAGNOSIS — Z34.83 ENCOUNTER FOR SUPERVISION OF OTHER NORMAL PREGNANCY, THIRD TRIMESTER: Primary | ICD-10-CM

## 2021-02-04 PROCEDURE — 99207 PR PRENATAL VISIT: CPT | Mod: GC | Performed by: STUDENT IN AN ORGANIZED HEALTH CARE EDUCATION/TRAINING PROGRAM

## 2021-02-04 ASSESSMENT — MIFFLIN-ST. JEOR: SCORE: 1268.77

## 2021-02-04 NOTE — NURSING NOTE
Due to patient being non-English speaking/uses sign language, an  was used for this visit. Only for face-to-face interpretation by an external agency, date and length of interpretation can be found on the scanned worksheet.     name: gisell   Agency: [employer]r  Language: May   Telephone number: [phone]  Type of interpretation: Telephone, spoken

## 2021-02-04 NOTE — PATIENT INSTRUCTIONS
[facility] Information- FAX NUMBER: [phone]  If you have any further concerns or wish to schedule another appointment, please call our office at [phone] during normal business hours (8-5, M-F).      For uncomplicated pregnancies, you will be seeing your doctor once a month until you are 28 weeks, then you will see your doctor twice a month. You will begin weekly visits at 36 weeks until you deliver.      If you have urgent medical questions that cannot wait, you may call [phone] at any time of day.      If you have a medical emergency, please call 911.     When to call or come in to the hospital    If you notice a decrease in your baby's movement.     If your begin to experience contractions that are 5 minutes or less apart and lasting for over 45 seconds, or if contractions are becoming more painful.    If you have any bleeding or leakage of fluids.     If you have a headache not resolved with tylenol, right upper abdominal pain, or sudden onset of swelling.    You know your body best. Never hesitate to call or go to the hospital if something doesn't feel right!    [facility]  1. Address: [address] [place] [address]. [place] [address]  2. Phone: [phone]     Preparing for the hospital  You re likely feeling anxious as your child s birth approaches. This is normal. To give yourself some peace of mind, pack a bag 3-4 weeks before your due date. Here is a list of things to remember:    Personal care items like toothbrush, hair brush, lip balm, lotion, shampoo, glasses, contacts    Nightgown, bathrobe, slippers    Several pairs of underwear    Comfortable clothes for you to wear home    Camera with new batteries    Cell phone and     Insurance information and any other paperwork needed for your hospital stay    Clothes for baby to wear home    An infant, rear-facing car seat for bringing home your baby (this is required by law)    In case of an EMERGENCY or you START  TO FEEL CONTRACTIONS:  Once you start to feel contractions (I.e. increased abdominal discomfort/cramping, with increase frequency) and/or notice gush of water/blood coming from your vagina, PLEASE GO TO [facility] - LABOR AND DELIVERY.    My Pager sometimes [phone] --> REQUEST THE NURSING STAFF TO CALL ME (DR. JOSE A SHEPPARD) - THEY HAVE MY CELL NUMBER IN THE UNIT.  Jose A Sheppard MD, MPH (PGY 3)  [facility] Family Medicine Resident

## 2021-02-04 NOTE — PROGRESS NOTES
HPI:       Sydney Dao is a 31 year old  female who presents to clinic for a follow up OB visit. She is currently 34w5d with an estimated date of delivery of Mar 13, 2021 via first .     Chief Complaint   Patient presents with     Prenatal Care     no concerns     Medication Reconciliation     complete       -No concerns today  -Patient reports no vaginal bleeding, no contractions/severe cramping, no leakage of fluid.   -Fetal movement is present.  - Planning to breast feed and bottle feed.   - Would like a natural birth as with her other children. All previous births were vaginal delivery.  - She does not want more children. Her  might want more children. Encouraged them to discuss further and explore contraception that would fit their plans.   - Taking prenatal vitamin.  -No heartburn, vaginal discharge, dysuria, headache, vision changes, lower extremity swelling, upper abdominal pain, chest pain, shortness of breath.   -Expecting baby boy, declines circumcision    Discussion about the following:   - labor symptoms: discussed  -Depression and social support systems: no mood issues   -Breast feeding: yes along with formula  -Contraception options after delivery: ParaGard IUD   -Discussed preferences during labor:   -Reviewed potential interventions during labor including amniotomy, operative vaginal delivery and operative  delivery    Prenatal labs reviewed    Medication Reconciliation completed    A [place]  was used for this visit           Review of Systems:     C: NEGATIVE for fatigue, unexpected change in weight  E: NEGATIVE for acute vision problems or changes  R: NEGATIVE for significant cough or shortness of breath  CV: NEGATIVE for chest pain, palpitations or new or worsening peripheral edema  P: NEGATIVE for changes in mood or affect     Allergies   Allergen Reactions     No Known Drug Allergy             PMHX:     Patient Active Problem List  "  Diagnosis     Screening for cervical cancer     Bilateral carpal tunnel syndrome     Numbness and tingling of both feet     Encounter for supervision of other normal pregnancy in third trimester       OB History    Para Term  AB Living   4 3 3 0 0 3   SAB TAB Ectopic Multiple Live Births   0 0 0 0 3      # Outcome Date GA Lbr Ajay/2nd Weight Sex Delivery Anes PTL Lv   4 Current            3 Term 01/10/19 39w5d  3.033 kg (6 lb 11 oz) M  None N BARBIE   2 Term 14 39w2d  2.948 kg (6 lb 8 oz) F  None N BARBIE      Birth Comments: \"Intestinal problems for baby requiring surgery\"   1 Term 13 38w2d 09:00 2.268 kg (5 lb) F Outside Hosp None N BARBIE      Birth Comments: had ctx, SROM,no problems during preg      Name: True       History reviewed. No pertinent past medical history.    Current Outpatient Medications   Medication Sig Dispense Refill     Prenatal Vit-Fe Fumarate-FA (PRENATAL MULTIVITAMIN W/IRON) 27-0.8 MG tablet Take 1 tablet by mouth daily 90 tablet 3       No results found for this or any previous visit (from the past 24 hour(s)).          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   Social Needs     Financial resource strain: None     Food insecurity     Worry: None     Inability: None     Transportation needs     Medical: None     Non-medical: None   Tobacco Use     Smoking status: Never Smoker     Smokeless tobacco: Never Used   Substance and Sexual Activity     Alcohol use: No     Drug use: No     Sexual activity: Yes     Partners: Male   Lifestyle     Physical activity     Days per week: None     Minutes per session: None     Stress: None   Relationships     Social connections     Talks on phone: None     Gets " "together: None     Attends Nondenominational service: None     Active member of club or organization: None     Attends meetings of clubs or organizations: None     Relationship status: None     Intimate partner violence     Fear of current or ex partner: None     Emotionally abused: None     Physically abused: None     Forced sexual activity: None   Other Topics Concern     Parent/sibling w/ CABG, MI or angioplasty before 65F 55M? Not Asked   Social History Narrative     None     Have you used any tobacco products, alcohol or any other drugs during this pregnancy before or after your knew you were pregnant? none         Physical Exam:     Vitals:    21 1036   BP: 104/68   BP Location: Right arm   Patient Position: Sitting   Cuff Size: Adult Regular   Pulse: 88   Resp: 18   Temp: 98.4  F (36.9  C)   TempSrc: Oral   SpO2: 97%   Weight: 65.2 kg (143 lb 12.8 oz)   Height: 1.5 m (4' 11.06\")     Body mass index is 28.99 kg/m .    /68 (BP Location: Right arm, Patient Position: Sitting, Cuff Size: Adult Regular)   Pulse 88   Temp 98.4  F (36.9  C) (Oral)   Resp 18   Ht 1.5 m (4' 11.06\")   Wt 65.2 kg (143 lb 12.8 oz)   LMP 2020   SpO2 97%   BMI 28.99 kg/m     GENERAL:  Pleasant pregnant female, alert, well groomed.  SKIN:  Warm and dry, without lesions or rashes   LUNGS:  Clear to auscultation.   HEART:  RRR without murmur.   ABDOMEN: Soft without masses , tenderness or organomegaly.  No CVA tenderness. No scars noted.. Fundus palpable above symphysis pubis ~34cm. FHT 140s  MUSCULOSKELETAL:  Full range of motion.   EXTREMITIES:  No edema. No significant varicosities.       Prenatal labs:   -   Hemoglobin   Date Value Ref Range Status   2021 12.5 12.0 - 16.0 g/dL Final         Assessment and Plan     (Z34.83) Encounter for supervision of other normal pregnancy, third trimester  (primary encounter diagnosis)  Comment: Sydney Dao is a 31 year old  female who presents to clinic for a follow up " OB visit. She is currently 34w5d with an estimated date of delivery of Mar 13, 2021 via first US. Expecting baby boy, declines circumcision. Contraception options after delivery: ParaGard IUD. No acute concerns today    Plan:  -GBS swab next visit  -Discussed preferences during labor.   -Reviewed potential interventions during labor including amniotomy, operative vaginal delivery and operative  delivery.   -The patient plans to deliver at [facility] with myself.   -Patient will continue taking prenatal vitamins and avoiding cigarettes & alcohol.   -Follow up on 2021    Options for treatment and follow-up care were reviewed with the patient and/or guardian. Pt and/or guardian engaged in the decision making process and verbalized understanding of the options discussed and agreed with the final plan.    Precepted today with: MD Cece Mcdonald MD, MPH (PGY 3)  [facility] Family Medicine Resident  Pager: [phone]

## 2021-02-22 ENCOUNTER — OFFICE VISIT (OUTPATIENT)
Dept: FAMILY MEDICINE | Facility: CLINIC | Age: 31
End: 2021-02-22
Payer: COMMERCIAL

## 2021-02-22 VITALS
BODY MASS INDEX: 28.98 KG/M2 | DIASTOLIC BLOOD PRESSURE: 76 MMHG | SYSTOLIC BLOOD PRESSURE: 110 MMHG | RESPIRATION RATE: 18 BRPM | HEIGHT: 60 IN | TEMPERATURE: 98 F | OXYGEN SATURATION: 97 % | WEIGHT: 147.6 LBS | HEART RATE: 103 BPM

## 2021-02-22 DIAGNOSIS — Z34.83 ENCOUNTER FOR SUPERVISION OF OTHER NORMAL PREGNANCY, THIRD TRIMESTER: Primary | ICD-10-CM

## 2021-02-22 PROCEDURE — 99207 PR PRENATAL VISIT: CPT | Mod: GC | Performed by: STUDENT IN AN ORGANIZED HEALTH CARE EDUCATION/TRAINING PROGRAM

## 2021-02-22 ASSESSMENT — MIFFLIN-ST. JEOR: SCORE: 1293.07

## 2021-02-22 NOTE — PROGRESS NOTES
HPI:        Valerie Dao is a 31 year old  female who presents to clinic for a follow up OB visit. She is currently 37w2d with an estimated date of delivery of Mar 13, 2021 via first US. Expecting baby boy, declines circumcision. Contraception options after delivery: ParaGard IUD. No acute concerns today    Chief Complaint   Patient presents with     Prenatal Care     HERMINIO No other concerns     Medication Reconciliation     Completed       -No concerns today   -Patient reports no vaginal bleeding, no contractions/severe cramping, no leakage of fluid.   -Fetal movement is present.  - Planning to breast feed and bottle feed.   - Would like a natural birth as with her other children. All previous births were vaginal delivery.  - She does not want more children. Her  might want more children. Encouraged them to discuss further and explore contraception that would fit their plans.   - Taking prenatal vitamin.  -No heartburn, vaginal discharge, dysuria, headache, vision changes, lower extremity swelling, upper abdominal pain, chest pain, shortness of breath.   -Expecting baby boy, declines circumcision     Discussion about the following:   - labor symptoms: discussed  -Depression and social support systems: no mood issues   -Breast feeding: yes along with formula  -Contraception options after delivery: ParaGard IUD   -Discussed preferences during labor:   -Reviewed potential interventions during labor including amniotomy, operative vaginal delivery and operative  delivery     Prenatal labs reviewed     Medication Reconciliation completed     A [employer]  was used for this visit         Review of Systems:     C: NEGATIVE for fatigue, unexpected change in weight  E: NEGATIVE for acute vision problems or changes  R: NEGATIVE for significant cough or shortness of breath  CV: NEGATIVE for chest pain, palpitations or new or worsening peripheral edema  P: NEGATIVE for changes in mood  "or affect          OBSTETRIC HISTORY:       OB History    Para Term  AB Living   4 3 3 0 0 3   SAB TAB Ectopic Multiple Live Births   0 0 0 0 3      # Outcome Date GA Lbr Ajay/2nd Weight Sex Delivery Anes PTL Lv   4 Current            3 Term 01/10/19 39w5d  3.033 kg (6 lb 11 oz) M  None N BARBIE   2 Term 14 39w2d  2.948 kg (6 lb 8 oz) F  None N BARBIE      Birth Comments: \"Intestinal problems for baby requiring surgery\"   1 Term 13 38w2d 09:00 2.268 kg (5 lb) F Outside Hosp None N BARBIE      Birth Comments: had ctx, SROM,no problems during preg      Name: True            PMHX:     Patient Active Problem List   Diagnosis     Screening for cervical cancer     Bilateral carpal tunnel syndrome     Numbness and tingling of both feet     Encounter for supervision of other normal pregnancy in third trimester       OB History    Para Term  AB Living   4 3 3 0 0 3   SAB TAB Ectopic Multiple Live Births   0 0 0 0 3      # Outcome Date GA Lbr Ajay/2nd Weight Sex Delivery Anes PTL Lv   4 Current            3 Term 01/10/19 39w5d  3.033 kg (6 lb 11 oz) M  None N BARBIE   2 Term 14 39w2d  2.948 kg (6 lb 8 oz) F  None N BARBIE      Birth Comments: \"Intestinal problems for baby requiring surgery\"   1 Term 13 38w2d 09:00 2.268 kg (5 lb) F Outside Hosp None N BARBIE      Birth Comments: had ctx, SROM,no problems during preg      Name: True       History reviewed. No pertinent past medical history.    Current Outpatient Medications   Medication Sig Dispense Refill     Prenatal Vit-Fe Fumarate-FA (PRENATAL MULTIVITAMIN W/IRON) 27-0.8 MG tablet Take 1 tablet by mouth daily 90 tablet 3       No results found for this or any previous visit (from the past 24 hour(s)).          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   Social Needs     Financial resource strain: Not on file     Food insecurity     Worry: Not on file     Inability: Not on file     Transportation needs     Medical: Not on file     Non-medical: Not on file   Tobacco Use     Smoking status: Never Smoker     Smokeless tobacco: Never Used   Substance and Sexual Activity     Alcohol use: No     Drug use: No     Sexual activity: Yes     Partners: Male   Lifestyle     Physical activity     Days per week: Not on file     Minutes per session: Not on file     Stress: Not on file   Relationships     Social connections     Talks on phone: Not on file     Gets together: Not on file     Attends Tenriism service: Not on file     Active member of club or organization: Not on file     Attends meetings of clubs or organizations: Not on file     Relationship status: Not on file     Intimate partner violence     Fear of current or ex partner: Not on file     Emotionally abused: Not on file     Physically abused: Not on file     Forced sexual activity: Not on file   Other Topics Concern     Parent/sibling w/ CABG, MI or angioplasty before 65F 55M? Not Asked   Social History Narrative     Not on file     Have you used any tobacco products, alcohol or any other drugs during this pregnancy before or after your knew you were pregnant? none         Physical Exam:            Physical Exam:     Vitals:    02/22/21 1302   BP: 110/76   Pulse: 103   Resp: 18   Temp: 98  F (36.7  C)   TempSrc: Oral   SpO2: 97%   Weight: 67 kg (147 lb 9.6 oz)   Height: 1.511 m (4' 11.5\")     Body mass index is 29.31 kg/m .    /76   Pulse 103   Temp 98  F (36.7  C) (Oral)   Resp 18   Ht 1.511 m (4' 11.5\")   Wt 67 kg (147 lb 9.6 oz)   LMP 06/02/2020   SpO2 97%   BMI 29.31 kg/m     GENERAL:  Pleasant pregnant female, alert, well groomed.  LUNGS:  Clear to auscultation.   HEART:  RRR without murmur.   ABDOMEN: Soft without masses " , tenderness or organomegaly.  No CVA tenderness. No scars noted.. Fundus palpable above symphysis pubis ~37cm. FHT 140s  MUSCULOSKELETAL:  Full range of motion.   EXTREMITIES:  No edema. No significant varicosities.   GENITALIA:  Normal appearing anatomy, no lesions noted.  VAGINA:  Pink, normal rugae and discharge normal and physiologic. Sample collected for GBS.     Prenatal labs:   -   Hemoglobin   Date Value Ref Range Status   2021 12.5 12.0 - 16.0 g/dL Final         Assessment and Plan     (Z34.83) Encounter for supervision of other normal pregnancy, third trimester  (primary encounter diagnosis)  Comment:js Dao is a 31 year old  female who presents to clinic for a follow up OB visit. She is currently 37w2d with an estimated date of delivery of Mar 13, 2021 via first US. Expecting baby boy, declines circumcision. Contraception options after delivery: ParaGard IUD. No acute concerns today    Plan:  -GBS pending.  -Contraception: IUD - prefers 5 year plan  -Expecting baby boy, declines circumcision  -Discussed preferences during labor.   -Reviewed potential interventions during labor including amniotomy, operative vaginal delivery and operative  delivery.   -The patient plans to deliver at [facility] with myself.   -Patient will continue taking prenatal vitamins and avoiding cigarettes & alcohol.   -Follow up on 2021    Options for treatment and follow-up care were reviewed with the patient and/or guardian. Pt and/or guardian engaged in the decision making process and verbalized understanding of the options discussed and agreed with the final plan.    Precepted today with: MD Cece Call MD, MPH (PGY 3)  [facility] Family Medicine Resident  Pager: [phone]

## 2021-02-22 NOTE — PROGRESS NOTES
Due to patient being non-English speaking/uses sign language, an  was used for this visit. Only for face-to-face interpretation by an external agency, date and length of interpretation can be found on the scanned worksheet.     name: Paige   Agency: [employer]  Language: Hmong   Telephone number: [phone]  Type of interpretation: Telephone, spoken     GUY Nelson

## 2021-02-22 NOTE — PATIENT INSTRUCTIONS
[facility] Information- FAX NUMBER: [phone]  If you have any further concerns or wish to schedule another appointment, please call our office at [phone] during normal business hours (8-5, M-F).      For uncomplicated pregnancies, you will be seeing your doctor once a month until you are 28 weeks, then you will see your doctor twice a month. You will begin weekly visits at 36 weeks until you deliver.      If you have urgent medical questions that cannot wait, you may call [phone] at any time of day.      If you have a medical emergency, please call 911.     When to call or come in to the hospital    If you notice a decrease in your baby's movement.     If your begin to experience contractions that are 5 minutes or less apart and lasting for over 45 seconds, or if contractions are becoming more painful.    If you have any bleeding or leakage of fluids.     If you have a headache not resolved with tylenol, right upper abdominal pain, or sudden onset of swelling.    You know your body best. Never hesitate to call or go to the hospital if something doesn't feel right!    [facility]  1. Address: [address] [place] [address]. [place] [address]  2. Phone: [phone]     Preparing for the hospital  You re likely feeling anxious as your child s birth approaches. This is normal. To give yourself some peace of mind, pack a bag 3-4 weeks before your due date. Here is a list of things to remember:    Personal care items like toothbrush, hair brush, lip balm, lotion, shampoo, glasses, contacts    Nightgown, bathrobe, slippers    Several pairs of underwear    Comfortable clothes for you to wear home    Camera with new batteries    Cell phone and     Insurance information and any other paperwork needed for your hospital stay    Clothes for baby to wear home    An infant, rear-facing car seat for bringing home your baby (this is required by law)    In case of an EMERGENCY or you START  TO FEEL CONTRACTIONS:  Once you start to feel contractions (I.e. increased abdominal discomfort/cramping, with increase frequency) and/or notice gush of water/blood coming from your vagina, PLEASE GO TO [facility] - LABOR AND DELIVERY.    My Pager sometimes [phone] --> REQUEST THE NURSING STAFF TO CALL ME (DR. JOSE A SHEPPARD) - THEY HAVE MY CELL NUMBER IN THE UNIT.  Jose A Sheppard MD, MPH (PGY 3)  [facility] Family Medicine Resident

## 2021-02-23 LAB
ALLERGIC TO PENICILLIN: NORMAL
GP B STREP AG SPEC QL LA: NEGATIVE

## 2021-02-23 NOTE — PROGRESS NOTES
Preceptor Attestation:  Patient's case reviewed and discussed with the resident, JOSE A LAWSON MD, and I personally evaluated the patient. I agree with written assessment and plan of care.    Supervising Physician:  Sabina Guzman MD   [facility]

## 2021-03-02 ENCOUNTER — OFFICE VISIT (OUTPATIENT)
Dept: FAMILY MEDICINE | Facility: CLINIC | Age: 31
End: 2021-03-02
Payer: COMMERCIAL

## 2021-03-02 VITALS
WEIGHT: 148 LBS | DIASTOLIC BLOOD PRESSURE: 69 MMHG | HEART RATE: 97 BPM | HEIGHT: 60 IN | TEMPERATURE: 97.9 F | OXYGEN SATURATION: 98 % | SYSTOLIC BLOOD PRESSURE: 103 MMHG | RESPIRATION RATE: 16 BRPM | BODY MASS INDEX: 29.06 KG/M2

## 2021-03-02 DIAGNOSIS — Z34.83 ENCOUNTER FOR SUPERVISION OF OTHER NORMAL PREGNANCY, THIRD TRIMESTER: Primary | ICD-10-CM

## 2021-03-02 PROCEDURE — 99207 PR PRENATAL VISIT: CPT | Mod: GC | Performed by: STUDENT IN AN ORGANIZED HEALTH CARE EDUCATION/TRAINING PROGRAM

## 2021-03-02 ASSESSMENT — MIFFLIN-ST. JEOR: SCORE: 1294.88

## 2021-03-02 NOTE — NURSING NOTE
Due to patient being non-English speaking/uses sign language, an  was used for this visit. Only for face-to-face interpretation by an external agency, date and length of interpretation can be found on the scanned worksheet.     name: miguel hoffmann  Agency: [employer]  Language: May   Telephone number: [phone]  Type of interpretation: telephone, spoken

## 2021-03-02 NOTE — PROGRESS NOTES
"       HPI:        Valerie Dao is a 31 year old  female who presents to clinic for a follow up OB visit. She is currently 38w3d with an estimated date of delivery of Mar 13, 2021 via first US. Expecting baby boy, declines circumcision. Contraception options after delivery: ParaGard IUD. GBS negative.     Chief Complaint   Patient presents with     Prenatal Care     38wks OB care, no concern     Medication Reconciliation     complete        -No concerns today   -Patient reports no vaginal bleeding, no contractions/severe cramping, no leakage of fluid.   -Fetal movement is present.  - Planning to breast feed and bottle feed.   - Would like a natural birth as with her other children. All previous births were vaginal delivery.  - She does not want more children. Her  might want more children. Encouraged them to discuss further and explore contraception that would fit their plans.   - Taking prenatal vitamin.  -No heartburn, vaginal discharge, dysuria, headache, vision changes, lower extremity swelling, upper abdominal pain, chest pain, shortness of breath.   -Expecting baby boy, declines circumcision      Medication Reconciliation completed    A [employer]  was used for this visit           Review of Systems:     C: NEGATIVE for fatigue, unexpected change in weight  E: NEGATIVE for acute vision problems or changes  R: NEGATIVE for significant cough or shortness of breath  CV: NEGATIVE for chest pain, palpitations or new or worsening peripheral edema  P: NEGATIVE for changes in mood or affect          OBSTETRIC HISTORY:       OB History    Para Term  AB Living   4 3 3 0 0 3   SAB TAB Ectopic Multiple Live Births   0 0 0 0 3      # Outcome Date GA Lbr Ajay/2nd Weight Sex Delivery Anes PTL Lv   4 Current            3 Term 01/10/19 39w5d  3.033 kg (6 lb 11 oz) M  None N BARBIE   2 Term 14 39w2d  2.948 kg (6 lb 8 oz) F  None N BARBIE      Birth Comments: \"Intestinal problems for baby " "requiring surgery\"   1 Term 13 38w2d 09:00 2.268 kg (5 lb) F Outside Hosp None N BARBIE      Birth Comments: had ctx, SROM,no problems during preg      Name: True            PMHX:     Patient Active Problem List   Diagnosis     Screening for cervical cancer     Bilateral carpal tunnel syndrome     Numbness and tingling of both feet     Encounter for supervision of other normal pregnancy in third trimester       OB History    Para Term  AB Living   4 3 3 0 0 3   SAB TAB Ectopic Multiple Live Births   0 0 0 0 3      # Outcome Date GA Lbr Ajay/2nd Weight Sex Delivery Anes PTL Lv   4 Current            3 Term 01/10/19 39w5d  3.033 kg (6 lb 11 oz) M  None N BARBIE   2 Term 14 39w2d  2.948 kg (6 lb 8 oz) F  None N BARBIE      Birth Comments: \"Intestinal problems for baby requiring surgery\"   1 Term 13 38w2d 09:00 2.268 kg (5 lb) F Outside Hosp None N BARBIE      Birth Comments: had ctx, SROM,no problems during preg      Name: True       No past medical history on file.    Current Outpatient Medications   Medication Sig Dispense Refill     Prenatal Vit-Fe Fumarate-FA (PRENATAL MULTIVITAMIN W/IRON) 27-0.8 MG tablet Take 1 tablet by mouth daily 90 tablet 3       No results found for this or any previous visit (from the past 24 hour(s)).          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   Social Needs     Financial resource strain: None     Food insecurity     Worry: None     Inability: None     Transportation needs     Medical: None     Non-medical: None   Tobacco Use     Smoking status: Never Smoker     Smokeless tobacco: Never Used   Substance and Sexual Activity     Alcohol use: No     Drug use: No     Sexual activity: Yes     Partners: Male   Lifestyle     " "Physical activity     Days per week: None     Minutes per session: None     Stress: None   Relationships     Social connections     Talks on phone: None     Gets together: None     Attends Sabianism service: None     Active member of club or organization: None     Attends meetings of clubs or organizations: None     Relationship status: None     Intimate partner violence     Fear of current or ex partner: None     Emotionally abused: None     Physically abused: None     Forced sexual activity: None   Other Topics Concern     Parent/sibling w/ CABG, MI or angioplasty before 65F 55M? Not Asked   Social History Narrative     None     Have you used any tobacco products, alcohol or any other drugs during this pregnancy before or after your knew you were pregnant? none           Physical Exam:     Vitals:    21 1030   BP: 103/69   BP Location: Right arm   Pulse: 97   Resp: 16   Temp: 97.9  F (36.6  C)   TempSrc: Oral   SpO2: 98%   Weight: 67.1 kg (148 lb)   Height: 1.511 m (4' 11.5\")     Body mass index is 29.39 kg/m .    /69 (BP Location: Right arm)   Pulse 97   Temp 97.9  F (36.6  C) (Oral)   Resp 16   Ht 1.511 m (4' 11.5\")   Wt 67.1 kg (148 lb)   LMP 2020   SpO2 98%   BMI 29.39 kg/m     GENERAL:  Pleasant pregnant female, alert, well groomed.  SKIN:  Warm and dry, without lesions or rashes   LUNGS:  Clear to auscultation.   HEART:  RRR without murmur.   ABDOMEN: Soft without masses , tenderness or organomegaly.  No CVA tenderness. No scars noted.. Fundus palpable above symphysis pubis ~38cm. FHT 140s  MUSCULOSKELETAL:  Full range of motion.   EXTREMITIES:  No edema. No significant varicosities.       Prenatal labs:   -   Hemoglobin   Date Value Ref Range Status   2021 12.5 12.0 - 16.0 g/dL Final         Assessment and Plan     (Z34.83) Encounter for supervision of other normal pregnancy, third trimester  (primary encounter diagnosis)  Comment:js Dao is a 31 year old  female " who presents to clinic for a follow up OB visit. She is currently 38w3d with an estimated date of delivery of Mar 13, 2021 via first US. Expecting baby boy, declines circumcision. Contraception options after delivery: ParaGard IUD. No acute concerns today     Plan:  -GBS negative.  -Contraception: IUD - prefers 5 year plan  -Expecting baby boy, declines circumcision  -Discussed preferences during labor.   -Reviewed potential interventions during labor including amniotomy, operative vaginal delivery and operative  delivery.   -The patient plans to deliver at [facility] with myself.   -Patient will continue taking prenatal vitamins and avoiding cigarettes & alcohol.   -Follow up on 03/10/2021     Options for treatment and follow-up care were reviewed with the patient and/or guardian. Pt and/or guardian engaged in the decision making process and verbalized understanding of the options discussed and agreed with the final plan.    Precepted today with: MD Cece Rowland MD, MPH (PGY 3)  [facility] Family Medicine Resident  Pager: [phone]

## 2021-03-02 NOTE — PATIENT INSTRUCTIONS
[facility] Information- FAX NUMBER: [phone]  If you have any further concerns or wish to schedule another appointment, please call our office at [phone] during normal business hours (8-5, M-F).      For uncomplicated pregnancies, you will be seeing your doctor once a month until you are 28 weeks, then you will see your doctor twice a month. You will begin weekly visits at 36 weeks until you deliver.      If you have urgent medical questions that cannot wait, you may call [phone] at any time of day.      If you have a medical emergency, please call 911.     When to call or come in to the hospital    If you notice a decrease in your baby's movement.     If your begin to experience contractions that are 5 minutes or less apart and lasting for over 45 seconds, or if contractions are becoming more painful.    If you have any bleeding or leakage of fluids.     If you have a headache not resolved with tylenol, right upper abdominal pain, or sudden onset of swelling.    You know your body best. Never hesitate to call or go to the hospital if something doesn't feel right!    [facility]  1. Address: [address] [place] [address]. [place] [address]  2. Phone: [phone]     Preparing for the hospital  You re likely feeling anxious as your child s birth approaches. This is normal. To give yourself some peace of mind, pack a bag 3-4 weeks before your due date. Here is a list of things to remember:    Personal care items like toothbrush, hair brush, lip balm, lotion, shampoo, glasses, contacts    Nightgown, bathrobe, slippers    Several pairs of underwear    Comfortable clothes for you to wear home    Camera with new batteries    Cell phone and     Insurance information and any other paperwork needed for your hospital stay    Clothes for baby to wear home    An infant, rear-facing car seat for bringing home your baby (this is required by law)    In case of an EMERGENCY or you START  TO FEEL CONTRACTIONS:  Once you start to feel contractions (I.e. increased abdominal discomfort/cramping, with increase frequency) and/or notice gush of water/blood coming from your vagina, PLEASE GO TO [facility] - LABOR AND DELIVERY.    My Pager sometimes [phone] --> REQUEST THE NURSING STAFF TO CALL ME (DR. JOSE A SHEPPARD) - THEY HAVE MY CELL NUMBER IN THE UNIT.  Jose A Sheppard MD, MPH (PGY 3)  [facility] Family Medicine Resident

## 2021-03-10 ENCOUNTER — OFFICE VISIT (OUTPATIENT)
Dept: FAMILY MEDICINE | Facility: CLINIC | Age: 31
End: 2021-03-10
Payer: COMMERCIAL

## 2021-03-10 VITALS
RESPIRATION RATE: 18 BRPM | WEIGHT: 151.2 LBS | DIASTOLIC BLOOD PRESSURE: 76 MMHG | SYSTOLIC BLOOD PRESSURE: 113 MMHG | OXYGEN SATURATION: 97 % | BODY MASS INDEX: 29.68 KG/M2 | HEART RATE: 72 BPM | HEIGHT: 60 IN | TEMPERATURE: 97.9 F

## 2021-03-10 DIAGNOSIS — Z34.83 ENCOUNTER FOR SUPERVISION OF OTHER NORMAL PREGNANCY, THIRD TRIMESTER: Primary | ICD-10-CM

## 2021-03-10 PROCEDURE — 99207 PR PRENATAL VISIT: CPT | Mod: GC | Performed by: STUDENT IN AN ORGANIZED HEALTH CARE EDUCATION/TRAINING PROGRAM

## 2021-03-10 ASSESSMENT — MIFFLIN-ST. JEOR: SCORE: 1309.4

## 2021-03-10 NOTE — PROGRESS NOTES
Due to patient being non-English speaking/uses sign language, an  was used for this visit. Only for face-to-face interpretation by an external agency, date and length of interpretation can be found on the scanned worksheet.     name: Paige  Agency: [employer]  Language: Hmong   Telephone number: [phone]  Type of interpretation: Telephone, spoken     GUY Nelson

## 2021-03-10 NOTE — PROGRESS NOTES
"Sydney Dao is a 31 year old  female who presents to clinic for a follow up OB visit. She is currently 39w4d with an estimated date of delivery of Mar 13, 2021 via first trimester US. She denies headache, chest pain, shortness of breath, abdominal pain/contractions, vaginal bleeding, or abnormal discharge. She has felt baby move.     New concerns today: None. Wants check to make sure baby's head is down.    OB History    Para Term  AB Living   4 3 3 0 0 3   SAB TAB Ectopic Multiple Live Births   0 0 0 0 3      # Outcome Date GA Lbr Ajay/2nd Weight Sex Delivery Anes PTL Lv   4 Current            3 Term 01/10/19 39w5d  3.033 kg (6 lb 11 oz) M  None N BARBIE   2 Term 14 39w2d  2.948 kg (6 lb 8 oz) F  None N BARBIE      Birth Comments: \"Intestinal problems for baby requiring surgery\"   1 Term 13 38w2d 09:00 2.268 kg (5 lb) F Outside Hosp None N BARBIE      Birth Comments: had ctx, SROM,no problems during preg      Name: True       Physical exam:  /76   Pulse 72   Temp 97.9  F (36.6  C) (Oral)   Resp 18   Ht 1.511 m (4' 11.5\")   Wt 68.6 kg (151 lb 3.2 oz)   LMP 2020   SpO2 97%   BMI 30.03 kg/m      General: alert, female in no acute distress  Abdomen: gravid uterus appropriate for gestational age, non-tender, FHTs 125, Leopold's maneuver reveals vertex positioning  Gyn: 0.5cm/50%/-2 station, scant discharge  Extremities: no edema    Assessment/Plan:  Patient Active Problem List   Diagnosis     Screening for cervical cancer     Bilateral carpal tunnel syndrome     Numbness and tingling of both feet     Encounter for supervision of other normal pregnancy in third trimester       GBS negative. Will/will not need penicillin in labor. PCN allergic? No  Discussed stripping membranes today and patient agreeable - however, patient has not dilated enough to make this possible.  Reviewed signs/symptoms of labor and when to present to the hospital.     If you notice a decrease " in your baby's movement. As a rule, if she perceives fewer than 10 movements in two hours at rest    If your begin to experience contractions that are 5 minutes or less apart and lasting for over 45 seconds, or if contractions are becoming more painful.    If you have any bleeding or leakage of fluids.     If you have a headache not resolved with tylenol, right upper abdominal pain, or sudden onset of swelling.  Follow up in 1 week for routine prenatal visit, sooner if labor symptoms.     ECV @ 37 wks for breech  Review birth plan  Discuss elective IOL @41 wks  Consider biweekly NST/BPP if post-dates    Dacia De La Cruz MD    Precepted with: Manoj Bowers MD

## 2021-03-12 ENCOUNTER — COMMUNICATION - HEALTHEAST (OUTPATIENT)
Dept: SCHEDULING | Facility: CLINIC | Age: 31
End: 2021-03-12

## 2021-03-17 NOTE — PROGRESS NOTES
Preceptor Attestation:  Patient's case reviewed and discussed with Dacia De La Cruz MD resident and I evaluated the patient. I agree with written assessment and plan of care.  Supervising Physician:  Jluis Bowers MD, MD [facility]

## 2021-03-17 NOTE — PROGRESS NOTES
Preceptor Attestation:  Patient seen, examined, and discussed with the resident..  I agree with written assessment and plan of care.  Supervising Physician:  Melinda Corrigan MD  [facility]

## 2021-04-23 ENCOUNTER — MEDICAL CORRESPONDENCE (OUTPATIENT)
Dept: HEALTH INFORMATION MANAGEMENT | Facility: CLINIC | Age: 31
End: 2021-04-23

## 2021-04-23 ENCOUNTER — OFFICE VISIT (OUTPATIENT)
Dept: FAMILY MEDICINE | Facility: CLINIC | Age: 31
End: 2021-04-23
Payer: COMMERCIAL

## 2021-04-23 VITALS
WEIGHT: 131 LBS | OXYGEN SATURATION: 98 % | SYSTOLIC BLOOD PRESSURE: 113 MMHG | HEART RATE: 58 BPM | HEIGHT: 59 IN | RESPIRATION RATE: 18 BRPM | TEMPERATURE: 98.3 F | BODY MASS INDEX: 26.41 KG/M2 | DIASTOLIC BLOOD PRESSURE: 74 MMHG

## 2021-04-23 DIAGNOSIS — K59.09 OTHER CONSTIPATION: ICD-10-CM

## 2021-04-23 PROCEDURE — 99207 PR PRENATAL VISIT: CPT | Mod: GC | Performed by: STUDENT IN AN ORGANIZED HEALTH CARE EDUCATION/TRAINING PROGRAM

## 2021-04-23 RX ORDER — POLYETHYLENE GLYCOL 3350 17 G/17G
1 POWDER, FOR SOLUTION ORAL DAILY
Qty: 255 G | Refills: 0 | Status: SHIPPED | OUTPATIENT
Start: 2021-04-23 | End: 2022-04-08

## 2021-04-23 ASSESSMENT — MIFFLIN-ST. JEOR: SCORE: 1210.71

## 2021-04-23 ASSESSMENT — PATIENT HEALTH QUESTIONNAIRE - PHQ9: SUM OF ALL RESPONSES TO PHQ QUESTIONS 1-9: 1

## 2021-04-23 NOTE — PATIENT INSTRUCTIONS
Patient Education     Constipation (Adult)  Constipation means that you have bowel movements that are less frequent than usual. Stools often become very hard and difficult to pass.  Constipation is very common. At some point in life, it affects almost everyone. Since everyone's bowel habits are different, what is constipation to one person may not be to another. Your healthcare provider may do tests to diagnose constipation. It depends on what he or she finds when evaluating you.    Symptoms of constipation include:    Abdominal pain    Bloating    Vomiting    Painful bowel movements    Itching, swelling, bleeding, or pain around the anus  Causes  Constipation can have many causes. These include:    Diet low in fiber    Too much dairy    Not drinking enough liquids    Lack of exercise or physical activity (especially true for older adults)    Changes in lifestyle or daily routine, including pregnancy, aging, work, and travel    Frequent use or misuse of laxatives    Ignoring the urge to have a bowel movement or delaying it until later    Medicines, such as certain prescription pain medicines, iron supplements, antacids, certain antidepressants, and calcium supplements    Diseases like irritable bowel syndrome, bowel obstructions, stroke, diabetes, thyroid disease, Parkinson disease, hemorrhoids, and colon cancer  Complications  Potential complications of constipation can include:    Hemorrhoids    Rectal bleeding from hemorrhoids or anal fissures (skin tears)    Hernias    Dependency on laxatives    Chronic constipation    Fecal impaction, a severe form of constipation in which a large amount of hard stool is in your rectum that you can't pass    Bowel obstruction or perforation  Home care  All treatment should be done after talking with your healthcare provider. This is especially true if you have another medical problems, are taking prescription medicines, or are an older adult. Treatment most often involves  lifestyle changes. You may also need medicines. Your healthcare provider will tell you which will work best for you. Follow the advice below to help avoid this problem in the future.  Lifestyle changes  These lifestyle changes can help prevent constipation:    Diet. Eat a high-fiber diet, with fresh fruit and vegetables, and reduce dairy intake, meats, and processed foods    Fluids. It's important to get enough fluids each day. Drink plenty of water when you eat more fiber. If you are on diet that limits the amount of fluid you can have, talk about this with your healthcare provider.    Regular exercise. Check with your healthcare provider first.  Medicines  Take any medicines as directed. Some laxatives are safe to use only every now and then. Others can be taken on a regular basis. While laxatives don't cause bowel dependence, they are treating the symptoms. So your constipation may return if you don't make other changes. Talk with your healthcare provider or pharmacist if you have questions.  Prescription pain medicines can cause constipation. If you are taking this kind of medicine, ask your healthcare provider if you should also take a stool softener.  Medicines you may take to treat constipation include:    Fiber supplements    Stool softeners    Laxatives    Enemas    Rectal suppositories  Follow-up care  Follow up with your healthcare provider if symptoms don't get better in the next few days. You may need to have more tests or see a specialist.  Call 911  Call 911 if any of these occur:    Trouble breathing    Stiff, rigid abdomen that is severely painful to touch    Confusion    Fainting or loss of consciousness    Rapid heart rate    Chest pain  When to seek medical advice  Call your healthcare provider right away if any of these occur:    Fever of 100.4 F (38 C) or higher, or as directed by your healthcare provider    Failure to resume normal bowel movements    Pain in your abdomen or back gets  worse    Nausea or vomiting    Swelling in your abdomen    Blood in the stool    Black, tarry stool    Involuntary weight loss    Weakness  Karen last reviewed this educational content on 6/1/2018 2000-2021 [employer]. All rights reserved. This information is not intended as a substitute for professional medical care. Always follow your healthcare professional's instructions.

## 2021-04-23 NOTE — NURSING NOTE
Due to patient being non-English speaking/uses sign language, an  was used for this visit. Only for face-to-face interpretation by an external agency, date and length of interpretation can be found on the scanned worksheet.     name: Merced White  Agency: [employer]  Language: May   Telephone number: [phone]  Type of interpretation: Telephone, spoken

## 2021-04-23 NOTE — PROGRESS NOTES
I have personally reviewed the history and examination as documented by Dr. Sheppard.  I was present during key portions of the visit and agree with the assessment and plan as documented for 31 yr old female here for 6-wk post-partum visit. Mood is stable. Treated for constipation. Precautions given. Anticipatory guidance given.     Bryan Saleh MD  April 23, 2021  8:58 AM

## 2021-04-23 NOTE — PROGRESS NOTES
"    Assessment & Plan     Routine postpartum follow-up  Normal postpartum exam after .   -Pap smear with HPV: done on 2020 and negative  -Contraception: undecided and wants to speak with her partner about this.    Other constipation  Physical examination consistent with external hemorrhoid at the 6 o'clock position. Admits to not enough fruit/vegetable/water intake. Anticipatory guidance given regarding diet and exercise. Will prescribe miralax  - polyethylene glycol (MIRALAX) 17 GM/Dose powder  Dispense: 255 g; Refill: 0  -Encouraged to follow-up if symptoms worsen or fail to improve by calling the clinic/going to nearest health facility.    Review of external notes as documented elsewhere in note       BMI:   Estimated body mass index is 26.41 kg/m  as calculated from the following:    Height as of this encounter: 1.5 m (4' 11.06\").    Weight as of this encounter: 59.4 kg (131 lb).   Weight management plan: Discussed healthy diet and exercise guidelines      Return if symptoms worsen or fail to improve.    Options for treatment and follow-up care were reviewed with the patient and/or guardian. Pt and/or guardian engaged in the decision making process and verbalized understanding of the options discussed and agreed with the final plan.    Precepted today with: MD Cece Banks MD, MPH (PGY 3)  [facility] Family Medicine Resident  Pager: [phone] [facility]        Gamaliel Grimes is a 31 year old who presents for the following health issues    Chief Complaint   Patient presents with     Postpartum Visit     No concerns      Medication Reconciliation     Completed         HPI     Sydney Dao is here for a 6-week postpartum checkup. She had a  of a viable boy, weight 7 pounds 8 oz., with no complications. Since delivery, she has been breast feeding. She has no signs of infection, bleeding or other complications. " "She is not pregnant. We discussed contraceptions and she has chosen none. She has not had intercourse since delivery. She is not experiencing postpartum depression. Screening has been completed for intimate partner violence.       Constipation     Onset: Shortly after delivery    Description:   Frequency of bowel movements: Every 3 days.  Stool consistency: hard, small caliber    Progression of Symptoms:  worsening    Accompanying Signs & Symptoms:  Abdominal pain (cramping?): No   Blood in stool:  No   Rectal pain:   YES - during bowel movement  Nausea/vomiting:  No   Weight loss or gain:  No     History:   History of abdominal surgery: No     Precipitating factors:   Recent use of narcotics, anticholinergics, calcium channel blockers, antacids, or iron supplements:  No   Any other new medication?  No   Chronic Laxative Use:  No   Fruits of Vegetables per day  1-2    Therapies Tried and outcome: none      PHQ 4/23/2021   PHQ-9 Total Score 1   Q9: Thoughts of better off dead/self-harm past 2 weeks Not at all         Review of Systems   Constitutional, HEENT, cardiovascular, pulmonary, gi and gu systems are negative, except as otherwise noted.      Objective    /74 (BP Location: Right arm, Patient Position: Sitting, Cuff Size: Adult Regular)   Pulse 58   Temp 98.3  F (36.8  C) (Oral)   Resp 18   Ht 1.5 m (4' 11.06\")   Wt 59.4 kg (131 lb)   LMP 06/02/2020   SpO2 98%   BMI 26.41 kg/m    Body mass index is 26.41 kg/m .  Physical Exam   GENERAL: healthy, alert and no distress  RESP: lungs clear to auscultation - no rales, rhonchi or wheezes  CV: regular rate and rhythm, normal S1 S2, no S3 or S4, no murmur, click or rub, no peripheral edema and peripheral pulses strong  ABDOMEN: soft, nontender, no hepatosplenomegaly, no masses and bowel sounds normal  RECTAL (female): normal sphincter tone, no rectal masses and external hemorrhoid at the 6 o'clock position  MS: no gross musculoskeletal defects noted, no " edema    ----- Service Performed and Documented by Resident or Fellow ------

## 2021-08-27 ENCOUNTER — MEDICAL CORRESPONDENCE (OUTPATIENT)
Dept: HEALTH INFORMATION MANAGEMENT | Facility: CLINIC | Age: 31
End: 2021-08-27

## 2022-01-05 ENCOUNTER — OFFICE VISIT (OUTPATIENT)
Dept: FAMILY MEDICINE | Facility: CLINIC | Age: 32
End: 2022-01-05
Payer: COMMERCIAL

## 2022-01-05 VITALS
OXYGEN SATURATION: 96 % | HEART RATE: 68 BPM | SYSTOLIC BLOOD PRESSURE: 107 MMHG | TEMPERATURE: 98.9 F | DIASTOLIC BLOOD PRESSURE: 72 MMHG | RESPIRATION RATE: 18 BRPM

## 2022-01-05 DIAGNOSIS — Z11.52 ENCOUNTER FOR SCREENING FOR COVID-19: Primary | ICD-10-CM

## 2022-01-05 PROCEDURE — 87804 INFLUENZA ASSAY W/OPTIC: CPT | Performed by: STUDENT IN AN ORGANIZED HEALTH CARE EDUCATION/TRAINING PROGRAM

## 2022-01-05 PROCEDURE — U0003 INFECTIOUS AGENT DETECTION BY NUCLEIC ACID (DNA OR RNA); SEVERE ACUTE RESPIRATORY SYNDROME CORONAVIRUS 2 (SARS-COV-2) (CORONAVIRUS DISEASE [COVID-19]), AMPLIFIED PROBE TECHNIQUE, MAKING USE OF HIGH THROUGHPUT TECHNOLOGIES AS DESCRIBED BY CMS-2020-01-R: HCPCS | Performed by: STUDENT IN AN ORGANIZED HEALTH CARE EDUCATION/TRAINING PROGRAM

## 2022-01-05 PROCEDURE — U0005 INFEC AGEN DETEC AMPLI PROBE: HCPCS | Performed by: STUDENT IN AN ORGANIZED HEALTH CARE EDUCATION/TRAINING PROGRAM

## 2022-01-05 PROCEDURE — 99213 OFFICE O/P EST LOW 20 MIN: CPT | Mod: 25 | Performed by: STUDENT IN AN ORGANIZED HEALTH CARE EDUCATION/TRAINING PROGRAM

## 2022-01-06 LAB
FLUAV AG SPEC QL IA: NEGATIVE
FLUBV AG SPEC QL IA: NEGATIVE
SARS-COV-2 RNA RESP QL NAA+PROBE: NEGATIVE

## 2022-01-07 NOTE — PROGRESS NOTES
Assessment and Plan   (Z11.52) Encounter for screening for COVID-19  (primary encounter diagnosis)  Comment: Asymptomatic.  Obtained COVID-19 test due to exposures.  Also obtained influenza test.  Both negative.  Anticipatory guidance given and patient verbalized understanding.  Plan: Symptomatic; Auto-generated order COVID-19         Virus (Coronavirus) by PCR Nose, Influenza A &         B Antigen          Follow up as needed.    Options for treatment and follow-up care were reviewed with the patient and/or guardian. Sydney Dao and/or guardian engaged in the decision making process and verbalized understanding of the options discussed and agreed with the final plan.    Jose Luis Mackay MD  [facility], PGY-3    Precepted patient with Dr. Hernan Hensley.       HPI:   Sydney Dao is a 31 year old female who presents to clinic today for   Chief Complaint   Patient presents with     Covid 19 Testing     Exposure to covid     Patient was exposed to 2 different coworkers within the last week who both tested positive for COVID-19.  She is currently asymptomatic but would like testing.  Specifically denies headache, nasal congestion, cough, fevers, chills, nausea, vomiting, diarrhea, myalgias, loss of sense of taste or smell, shortness of breath.  She received 2 doses of Pfizer COVID-19 vaccine.         Review of Systems:     Complete ROS negative except as noted in HPI.         PMHX:   Active Problems List  Patient Active Problem List   Diagnosis     Screening for cervical cancer     Bilateral carpal tunnel syndrome     Numbness and tingling of both feet     Encounter for supervision of other normal pregnancy in third trimester     Active problem list reviewed and updated.    Current Medications  Current Outpatient Medications   Medication Sig Dispense Refill     polyethylene glycol (MIRALAX) 17 GM/Dose powder Take 17 g (1 capful) by mouth  daily 255 g 0     Prenatal Vit-Fe Fumarate-FA (PRENATAL MULTIVITAMIN W/IRON) 27-0.8 MG tablet Take 1 tablet by mouth daily (Patient not taking: Reported on 4/23/2021) 90 tablet 3     Medication list reviewed and updated.    Social History  Social History     Tobacco Use     Smoking status: Never Smoker     Smokeless tobacco: Never Used   Substance Use Topics     Alcohol use: No     Drug use: No     History   Drug Use No       Family History  Family History   Problem Relation Age of Onset     Family History Negative Other      Cancer No family hx of      Diabetes No family hx of      Coronary Artery Disease No family hx of      Heart Disease No family hx of      Hypertension No family hx of      Asthma No family hx of        Allergies  Allergies   Allergen Reactions     No Known Drug Allergy             Physical Exam:     Vitals:    01/05/22 1620   BP: 107/72   Pulse: 68   Resp: 18   Temp: 98.9  F (37.2  C)   TempSrc: Oral   SpO2: 96%     There is no height or weight on file to calculate BMI.    GENERAL APPEARANCE: alert, appears stated age, no acute distress.  HEENT: Eyes grossly normal to inspection, nares normal, MMM.  RESP: no increased respiratory effort.  CV: good capillary refill.  ABDOMEN: nondistended.  MSK: extremities normal, no gross deformities noted, no lower extremity edema.  SKIN: no suspicious lesions or rashes.  NEURO: Normal strength and tone, sensory exam grossly normal, mentation appears intact and speech normal.  PSYCH: mood and affect appropriate.

## 2022-04-08 ENCOUNTER — OFFICE VISIT (OUTPATIENT)
Dept: FAMILY MEDICINE | Facility: CLINIC | Age: 32
End: 2022-04-08
Payer: COMMERCIAL

## 2022-04-08 VITALS
SYSTOLIC BLOOD PRESSURE: 112 MMHG | DIASTOLIC BLOOD PRESSURE: 70 MMHG | OXYGEN SATURATION: 99 % | TEMPERATURE: 98.6 F | HEART RATE: 74 BPM | HEIGHT: 59 IN | WEIGHT: 133 LBS | RESPIRATION RATE: 20 BRPM | BODY MASS INDEX: 26.81 KG/M2

## 2022-04-08 DIAGNOSIS — N89.8 VAGINAL DISCHARGE: Primary | ICD-10-CM

## 2022-04-08 DIAGNOSIS — R10.2 PELVIC PAIN IN FEMALE: ICD-10-CM

## 2022-04-08 DIAGNOSIS — R53.83 FATIGUE, UNSPECIFIED TYPE: ICD-10-CM

## 2022-04-08 DIAGNOSIS — Z32.01 PREGNANCY TEST POSITIVE: ICD-10-CM

## 2022-04-08 LAB — HCG UR QL: POSITIVE

## 2022-04-08 PROCEDURE — 99214 OFFICE O/P EST MOD 30 MIN: CPT | Performed by: FAMILY MEDICINE

## 2022-04-08 PROCEDURE — 87591 N.GONORRHOEAE DNA AMP PROB: CPT | Performed by: FAMILY MEDICINE

## 2022-04-08 PROCEDURE — 87491 CHLMYD TRACH DNA AMP PROBE: CPT | Performed by: FAMILY MEDICINE

## 2022-04-08 PROCEDURE — 81025 URINE PREGNANCY TEST: CPT | Performed by: FAMILY MEDICINE

## 2022-04-08 RX ORDER — PRENATAL VIT/IRON FUM/FOLIC AC 27MG-0.8MG
1 TABLET ORAL DAILY
Qty: 90 TABLET | Refills: 3 | Status: SHIPPED | OUTPATIENT
Start: 2022-04-08

## 2022-04-08 NOTE — Clinical Note
KARENI - confirmed very early pregnancy today in a patient with some pelvic pain and discharge. Low risk for STIs but waiting on GC/CT results. I ordered an US to be done for dating after 5/6/22. She expects a call from you for her intake.     Thanks!    Sabina Guzman MD

## 2022-04-08 NOTE — PATIENT INSTRUCTIONS
- Restart a prenatal vitamin now. I sent a prescription in for this    - We will call on Monday with the results of the rest of your tests and treat if there are signs of an infection    - If you develop worsening abdominal or pelvic pain on one side, please seek medical attention promptly for an ultrasound     - We will reach out to set up your first prenatal appointment and to set up an ultrasound in the next few weeks over the phone

## 2022-04-08 NOTE — PROGRESS NOTES
"  Assessment & Plan     Vaginal discharge  Pelvic pain in female  Approximately 10 day history of vaginal discharge that is blood tinged, urinary frequency, mild nausea and fatigue. Exam with clear yellow mucous and mild inflammation with ectropion and mild cervical motion tenderness. Sx may be due to early pregnancy, which we diagnosed today. Initial wet prep read negative for trichomonas. Will await and treat GC/CT as indicated early next week given low risk history for STIs and PID.  - Wet preparation  - Neisseria gonorrhoeae PCR; Future  - Chlamydia trachomatis PCR; Future  - Neisseria gonorrhoeae PCR  - Chlamydia trachomatis PCR  - HCG qualitative urine; Future  - HCG qualitative urine    Fatigue, unspecified type  Likely related to early pregnancy. Would want to exclude anemia with initial prenatal labs.  - HCG qualitative    Pregnancy test positive   here with positive UPT, unplanned but welcomed pregnancy. Dating uncertain - patient believes LMP began between 3/11-3/15 which would place her approximately toward the end of her 3rd week of gestation.  - Prenatal Vit-Fe Fumarate-FA (PRENATAL MULTIVITAMIN W/IRON) 27-0.8 MG tablet; Take 1 tablet by mouth daily  - US OB <14 WKS SINGLE OR FIRST GESTATION; Future to be done in 4 weeks or more.  - Connect to RNs for intake visit.  - Reviewed symptoms that should prompt more urgent follow-up including worsening nausea/vomiting and one-sided pelvic pain.    34 minutes spent on the date of the encounter doing chart review, review of test results, patient visit and documentation        BMI:   Estimated body mass index is 26.86 kg/m  as calculated from the following:    Height as of this encounter: 1.499 m (4' 11\").    Weight as of this encounter: 60.3 kg (133 lb).   Weight management plan: Patient was referred to their PCP to discuss a diet and exercise plan.    Follow up in 4-6 weeks for dating US and OB intake visit over the telephone.     Sabina Guzman, " "MD PETERS [facility] [place] Subjective Mai is a 32 year old who presents for the following health issues     Due to language barrier, an  was present during the history-taking and subsequent discussion (and for part of the physical exam) with this patient.    HPI     She is here today to discuss vaginal discharge. This started with abdominal and pelvic pain that began about 10 days ago.   She then noted some itching and increased white vaginal discharge that is blood tinged. No odor. It feels like some swelling in her vaginal area. She has increased frequency to her urination. She also feels more tired than normal for the past approximately 2 weeks. Some intermittent nausea but no vomiting. No fevers or chills. Her symptoms have improved slightly since they first began. The abdominal pain was constant initially and radiating into her back, but has become less frequent. She reports no pain for the past few days.     LMP was mid-March (between 3/11-3/15) and was normal for her. She reports monthly menses. She is currently sexually active with her  and is not using any birth control. She has no concerns about STI exposure.  She denies any dyspareunia but has not had sex since her symptoms have started.         Objective    /70   Pulse 74   Temp 98.6  F (37  C)   Resp 20   Ht 1.499 m (4' 11\")   Wt 60.3 kg (133 lb)   SpO2 99%   BMI 26.86 kg/m    Body mass index is 26.86 kg/m .  Physical Exam   GENERAL: healthy, alert and no distress  RESP: lungs clear to auscultation - no rales, rhonchi or wheezes  ABDOMEN: soft, nontender, no hepatosplenomegaly, no masses and bowel sounds normal   (female): normal female external genitalia, normal urethral meatus , vaginal mucosa pink, moist, well rugated, cervical discharge is yellow and slightly inflamed with ectropion, mild cervical motion tenderness with palpation, no adnexal tenderness or masses    Results for orders placed or " performed in visit on 04/08/22 (from the past 24 hour(s))   HCG qualitative urine   Result Value Ref Range    hCG Urine Qualitative Positive (A) Negative

## 2022-04-08 NOTE — NURSING NOTE
Due to patient being non-English speaking/uses sign language, an  was used for this visit. Only for face-to-face interpretation by an external agency, date and length of interpretation can be found on the scanned worksheet.     name: Uzma Yuan  Agency: [employer]  Language: May   Telephone number: [phone]  Type of interpretation: Face-to-face, spoken

## 2022-04-09 LAB
C TRACH DNA SPEC QL NAA+PROBE: NEGATIVE
N GONORRHOEA DNA SPEC QL NAA+PROBE: NEGATIVE

## 2022-04-18 ENCOUNTER — TELEPHONE (OUTPATIENT)
Dept: FAMILY MEDICINE | Facility: CLINIC | Age: 32
End: 2022-04-18
Payer: COMMERCIAL

## 2022-04-18 NOTE — TELEPHONE ENCOUNTER
OB intake completed via phone with patient today. 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 No history of miscarriage or . Previous pregnancies were uncomplicated, full term, all normal, spontaneous, vaginal deliveries. Current pregnancy was unplanned but desired by both patient and her . Sydney's /FOB is Ariana White, 41 yo, Hmong male who is also the same father for all previous pregnancies. Personal and family medical history reviewed from , no changes. No identified risk for genetic disorder on either side of the family.     LMP is approximate around mid 2022. Dating ultrasound order was placed by Dr Guzman. Ultrasound scheduled to be done at [facility] on 2022. Symptoms of vaginal discharge with slight bloody tinged has improved to resolved at this time since was last seen in clinic 2022 . Not further experiencing low, abdominal, mild cramping. Otherwise feeling ok, decrease appetite but able to continue to eat and drink. Encouraged to try to continue to maintain good hydration. Denies nausea and vomiting. Taking prenatal vitamins without difficulty or issues.  No concerns voiced during intake. Requests to see female provider for prenatal care, NOB is scheduled with Dr Coulter. Marquez RN    Average Risk Category  No significant risk factors: No    At Risk Category (up to 3)  Teen pregnancy: No  Poor social situation: No  Domestic abuse: No  Financial difficulties: No  Smoker: No  H/O  deliver: No  H/O drug abuse: No  Non-English speaking: Yes  Advanced maternal age: No  GDM risks: No  Previous C/S: No  H/O PIH: No  H/O STIs: No  H/O mental health concerns: No  Onset care > 20 weeks: No  Other: NONE    High Risk Category (4 or more At Risk or)  Diabetes/GDM: No  Multiple gestation: No  Chronic hypertension: No  Significant hx of asthma: No  Fetal demise > 20 weeks: No  Positive tox screen: No  Current mental health  treatment: No  Other: NONE    Risk: Average Risk   Date determined: 4/18/2022  Marquez LOPEZ

## 2022-05-11 ENCOUNTER — OFFICE VISIT (OUTPATIENT)
Dept: FAMILY MEDICINE | Facility: CLINIC | Age: 32
End: 2022-05-11
Payer: COMMERCIAL

## 2022-05-11 VITALS
SYSTOLIC BLOOD PRESSURE: 106 MMHG | TEMPERATURE: 98.2 F | HEART RATE: 75 BPM | WEIGHT: 137 LBS | OXYGEN SATURATION: 99 % | RESPIRATION RATE: 18 BRPM | DIASTOLIC BLOOD PRESSURE: 69 MMHG | BODY MASS INDEX: 27.62 KG/M2 | HEIGHT: 59 IN

## 2022-05-11 DIAGNOSIS — M62.81 MUSCLE WEAKNESS (GENERALIZED): ICD-10-CM

## 2022-05-11 DIAGNOSIS — O21.9 NAUSEA AND VOMITING OF PREGNANCY, ANTEPARTUM: Primary | ICD-10-CM

## 2022-05-11 LAB
ANION GAP SERPL CALCULATED.3IONS-SCNC: 13 MMOL/L (ref 5–18)
BUN SERPL-MCNC: 5 MG/DL (ref 8–22)
CALCIUM SERPL-MCNC: 8.7 MG/DL (ref 8.5–10.5)
CHLORIDE BLD-SCNC: 103 MMOL/L (ref 98–107)
CO2 SERPL-SCNC: 21 MMOL/L (ref 22–31)
CREAT SERPL-MCNC: 0.54 MG/DL (ref 0.6–1.1)
ERYTHROCYTE [DISTWIDTH] IN BLOOD BY AUTOMATED COUNT: 12.9 % (ref 10–15)
GFR SERPL CREATININE-BSD FRML MDRD: >90 ML/MIN/1.73M2
GLUCOSE BLD-MCNC: 118 MG/DL (ref 70–125)
HCT VFR BLD AUTO: 42.7 % (ref 35–47)
HGB BLD-MCNC: 14.2 G/DL (ref 11.7–15.7)
MCH RBC QN AUTO: 29.8 PG (ref 26.5–33)
MCHC RBC AUTO-ENTMCNC: 33.3 G/DL (ref 31.5–36.5)
MCV RBC AUTO: 90 FL (ref 78–100)
PLATELET # BLD AUTO: 359 10E3/UL (ref 150–450)
POTASSIUM BLD-SCNC: 3.5 MMOL/L (ref 3.5–5)
RBC # BLD AUTO: 4.76 10E6/UL (ref 3.8–5.2)
SODIUM SERPL-SCNC: 137 MMOL/L (ref 136–145)
WBC # BLD AUTO: 11.7 10E3/UL (ref 4–11)

## 2022-05-11 PROCEDURE — 80048 BASIC METABOLIC PNL TOTAL CA: CPT | Performed by: STUDENT IN AN ORGANIZED HEALTH CARE EDUCATION/TRAINING PROGRAM

## 2022-05-11 PROCEDURE — 85027 COMPLETE CBC AUTOMATED: CPT | Performed by: STUDENT IN AN ORGANIZED HEALTH CARE EDUCATION/TRAINING PROGRAM

## 2022-05-11 PROCEDURE — 36415 COLL VENOUS BLD VENIPUNCTURE: CPT | Performed by: STUDENT IN AN ORGANIZED HEALTH CARE EDUCATION/TRAINING PROGRAM

## 2022-05-11 PROCEDURE — 99214 OFFICE O/P EST MOD 30 MIN: CPT | Performed by: STUDENT IN AN ORGANIZED HEALTH CARE EDUCATION/TRAINING PROGRAM

## 2022-05-11 RX ORDER — ONDANSETRON 4 MG/1
4 TABLET, ORALLY DISINTEGRATING ORAL EVERY 8 HOURS PRN
Qty: 30 TABLET | Refills: 1 | Status: SHIPPED | OUTPATIENT
Start: 2022-05-11 | End: 2022-08-19

## 2022-05-11 RX ORDER — PYRIDOXINE HCL (VITAMIN B6) 25 MG
25 TABLET ORAL 3 TIMES DAILY
Qty: 90 TABLET | Refills: 1 | Status: SHIPPED | OUTPATIENT
Start: 2022-05-11 | End: 2022-08-19

## 2022-05-11 NOTE — NURSING NOTE
Due to patient being non-English speaking/uses sign language, an  was used for this visit. Only for face-to-face interpretation by an external agency, date and length of interpretation can be found on the scanned worksheet.     name: ID: [id number]   Agency: [employer]  Language: Hmong   Telephone number: [phone]  Type of interpretation: Telephone, spoken

## 2022-05-11 NOTE — LETTER
RETURN TO WORK/SCHOOL FORM    5/11/2022    Re: Sydney Dao  1990      To Whom It May Concern:     Sydney Dao was seen in clinic today. She may return to work without restrictions on 5/12/22            Claudia Preciado MD  5/11/2022 11:20 AM

## 2022-05-11 NOTE — PROGRESS NOTES
HPI:     Sydney Dao is a 32 year old  at 8w5d by LMP female with no significant PMH who presents with concerns during the first trimester.     Sydney Dao is here alone.     at 8w5d by LMP, ADALI Dec 16th    Has been feeling tired and weak.   Feeling very nauseous too.     Tries to eat because otherwise she has no energy to go to work. Works helping with manufacturing of safety/security boxes.     Vomiting all day every day. She is feeling very stressed because of her symptoms but also because of being worried about .     She has a 9 year old, 8 year old, and 1 year old. She and her  both work and her family all lives in [place] so she doesn't have any help with her kids. She feels overwhelmed at how she would care for another child and is considering an elective . She doesn't know what this would entail and would like the details about this. She is not sure whether she would want to do this or not- she does want more children. She is still planning to have her ultrasound tomorrow at our clinic.     A [email or web address]  was used for this visit         PMHX:     Patient Active Problem List   Diagnosis     Screening for cervical cancer     Bilateral carpal tunnel syndrome     Numbness and tingling of both feet     Encounter for supervision of other normal pregnancy in third trimester       Current Outpatient Medications   Medication Sig Dispense Refill     Prenatal Vit-Fe Fumarate-FA (PRENATAL MULTIVITAMIN W/IRON) 27-0.8 MG tablet Take 1 tablet by mouth daily 90 tablet 3       Social History     Tobacco Use     Smoking status: Never Smoker     Smokeless tobacco: Never Used   Substance Use Topics     Alcohol use: No     Drug use: No       Social History     Social History Narrative     Not on file       Allergies   Allergen Reactions     No Known Drug Allergy        No results found for this or any previous visit (from the past 24 hour(s)).         Review of Systems:   7  "point ROS negative except as noted.           Physical Exam:     Vitals:    22 1039   BP: 106/69   Pulse: 75   Resp: 18   Temp: 98.2  F (36.8  C)   TempSrc: Oral   SpO2: 99%   Weight: 62.1 kg (137 lb)   Height: 1.5 m (4' 11.06\")     Body mass index is 27.62 kg/m .    General: Alert, well-appearing female in NAD  HEENT: PERRL, moist oral mucus membranes  Pulm: CTA BL, no tachypnea  CV: RRR, no murmur   Abd: soft, NTND, no masses  Ext: Warm, well perfused, 2+ BL radial pulses, no LE edema  Skin: No rash on limited skin exam  Psych: Mood appropriate to visit content, full affect, rational thought content and process      Assessment and Plan     1. Nausea and vomiting of pregnancy, antepartum  Patient feeling very tired and having significant nausea and vomiting of pregnancy. I offered that we could start with B6 and unisom and if that is not enough, she should take zofran TID prn. I discussed risks/benefits of zofran and patient and I agree that at this time benefit outweighs risk. Checked BMP and CBC to ensure no sign of dehydration or electrolyte disturbance or sign of underlying infection and those results are normal.   - pyridOXINE (VITAMIN B6) 25 MG tablet; Take 1 tablet (25 mg) by mouth 3 times daily  Dispense: 90 tablet; Refill: 1  - doxylamine (UNISOM SLEEPTABS) 25 MG TABS tablet; Take 0.5 tablets (12.5 mg) by mouth nightly as needed (nausea)  Dispense: 60 tablet; Refill: 0  - ondansetron (ZOFRAN ODT) 4 MG ODT tab; Take 1 tablet (4 mg) by mouth every 8 hours as needed for nausea  Dispense: 30 tablet; Refill: 1  - Basic Metabolic Panel ([facility]) - Results < 1 hr  - CBC with platelets    2. Uncertain desire to continue pregnancy  Patient is still uncertain at this time if she wishes to continue pregnancy or have an elective . We discussed what  procedures (medication or surgical) entail. I also forwarded a message to our  to see if there might be any additional resources to help " with  since that is very stressful to her right now. I wrote her a note for work and offered to do additional paperwork so that she can have more time off if needed. She plans to still come to her dating ultrasound tomorrow.       There are no discontinued medications.    Options for treatment and follow-up care were reviewed with the patient and/or guardian. Sydney Dao and/or guardian engaged in the decision making process and verbalized understanding of the options discussed and agreed with the final plan.    Claudia Preciado MD  [facility]   Pager: [phone]

## 2022-05-12 ENCOUNTER — TELEPHONE (OUTPATIENT)
Dept: FAMILY MEDICINE | Facility: CLINIC | Age: 32
End: 2022-05-12

## 2022-05-12 ENCOUNTER — ANCILLARY PROCEDURE (OUTPATIENT)
Dept: ULTRASOUND IMAGING | Facility: CLINIC | Age: 32
End: 2022-05-12
Attending: FAMILY MEDICINE
Payer: COMMERCIAL

## 2022-05-12 DIAGNOSIS — Z32.01 PREGNANCY TEST POSITIVE: ICD-10-CM

## 2022-05-12 PROCEDURE — 76801 OB US < 14 WKS SINGLE FETUS: CPT | Mod: TC | Performed by: RADIOLOGY

## 2022-05-12 NOTE — NURSING NOTE
Due to patient being non-English speaking/uses sign language, an  was used for this visit. Only for face-to-face interpretation by an external agency, date and length of interpretation can be found on the scanned worksheet.     name: Sonia Dao  Agency: [employer]  Language: Hmong   Telephone number: .  Type of interpretation: Face-to-face, spoken

## 2022-05-12 NOTE — RESULT ENCOUNTER NOTE
Could you call the patient with the following message? Thank you!    Dear Sydney,    Your labs came back and show that you are doing a good job staying hydrated. You do not need IV fluids at this time. Your blood counts all look good. Your white count is consistent with what I would expect in pregnancy. Please let me know if there is anything I can do to help you.    Sincerely,  Dr. Claudia Preciado

## 2022-05-18 ENCOUNTER — OFFICE VISIT (OUTPATIENT)
Dept: FAMILY MEDICINE | Facility: CLINIC | Age: 32
End: 2022-05-18
Payer: COMMERCIAL

## 2022-05-18 ENCOUNTER — TELEPHONE (OUTPATIENT)
Dept: FAMILY MEDICINE | Facility: CLINIC | Age: 32
End: 2022-05-18

## 2022-05-18 VITALS
WEIGHT: 137 LBS | DIASTOLIC BLOOD PRESSURE: 66 MMHG | BODY MASS INDEX: 27.62 KG/M2 | HEIGHT: 59 IN | HEART RATE: 82 BPM | OXYGEN SATURATION: 98 % | SYSTOLIC BLOOD PRESSURE: 102 MMHG | TEMPERATURE: 97.4 F

## 2022-05-18 DIAGNOSIS — Z34.01 ENCOUNTER FOR SUPERVISION OF NORMAL FIRST PREGNANCY IN FIRST TRIMESTER: Primary | ICD-10-CM

## 2022-05-18 LAB
ABO/RH(D): NORMAL
ANTIBODY SCREEN: NEGATIVE
ERYTHROCYTE [DISTWIDTH] IN BLOOD BY AUTOMATED COUNT: 13.2 % (ref 10–15)
HCT VFR BLD AUTO: 34.1 % (ref 35–47)
HGB BLD-MCNC: 11.5 G/DL (ref 11.7–15.7)
HIV 1+2 AB+HIV1 P24 AG SERPL QL IA: NEGATIVE
MCH RBC QN AUTO: 30.1 PG (ref 26.5–33)
MCHC RBC AUTO-ENTMCNC: 33.7 G/DL (ref 31.5–36.5)
MCV RBC AUTO: 89 FL (ref 78–100)
PLATELET # BLD AUTO: 305 10E3/UL (ref 150–450)
RBC # BLD AUTO: 3.82 10E6/UL (ref 3.8–5.2)
SPECIMEN EXPIRATION DATE: NORMAL
SPECIMEN EXPIRATION DATE: NORMAL
WBC # BLD AUTO: 9.7 10E3/UL (ref 4–11)

## 2022-05-18 PROCEDURE — 36415 COLL VENOUS BLD VENIPUNCTURE: CPT

## 2022-05-18 PROCEDURE — 87389 HIV-1 AG W/HIV-1&-2 AB AG IA: CPT

## 2022-05-18 PROCEDURE — 86780 TREPONEMA PALLIDUM: CPT

## 2022-05-18 PROCEDURE — 86900 BLOOD TYPING SEROLOGIC ABO: CPT

## 2022-05-18 PROCEDURE — 99207 PR FIRST OB VISIT: CPT | Mod: GC

## 2022-05-18 PROCEDURE — 83655 ASSAY OF LEAD: CPT | Mod: 90

## 2022-05-18 PROCEDURE — 87086 URINE CULTURE/COLONY COUNT: CPT

## 2022-05-18 PROCEDURE — 85027 COMPLETE CBC AUTOMATED: CPT

## 2022-05-18 PROCEDURE — 99000 SPECIMEN HANDLING OFFICE-LAB: CPT

## 2022-05-18 PROCEDURE — 86762 RUBELLA ANTIBODY: CPT

## 2022-05-18 PROCEDURE — 86850 RBC ANTIBODY SCREEN: CPT

## 2022-05-18 PROCEDURE — 87340 HEPATITIS B SURFACE AG IA: CPT

## 2022-05-18 PROCEDURE — 86901 BLOOD TYPING SEROLOGIC RH(D): CPT

## 2022-05-18 NOTE — PROGRESS NOTES
"Sydney Dao is a 32 year old  female who presents to clinic for a new OB visit.  Her last menstrual period was 3/1/22 -- unsure.    Estimated Date of Delivery: Dec 10, 2022 via first trimester US    She has not had bleeding since her LMP. She has not had any abdominal pain or cramping since her LMP. She has had mild nausea and one time vomiting in morning. Improving now and able to keep food down. Weight loss has not occurred. This was not a planned pregnancy. Unsure if she wants to keep pregnancy -- she doesn't but  does. Last visit [facility] but they didn't have an .    OTHER CONCERNS: None    Pre Term Labor Risk Assessment  Is the patient's age <18 or >40? No  Patint's BMI is Body mass index is 27.62 kg/m .. Does patient have a BMI < 18.5? No  If previous pregnancy, was delivery within previous 6 months? No  Have you ever delivered a baby prior to 37 weeks gestation? No  Did conception for this pregnancy occur via In Vitro Fertilization? No  Are you carrying twins?  No  Summary: Patient is not high risk for  Labor for  labor.  Summary:  Patient is Average/high risk for  Labor (verify Problem List includes V23.9 and note risk factor(s) in the Comments)  The patient has the following risk factors for  labor: None  Average risk pregnancy    Patient Active Problem List   Diagnosis     Screening for cervical cancer     Bilateral carpal tunnel syndrome     Numbness and tingling of both feet     Encounter for supervision of other normal pregnancy in third trimester       OB History    Para Term  AB Living   5 4 4 0 0 4   SAB IAB Ectopic Multiple Live Births   0 0 0 0 4      # Outcome Date GA Lbr Ajay/2nd Weight Sex Delivery Anes PTL Lv   5 Current            4 Term 21 39w6d  3.402 kg (7 lb 8 oz) M   N BARBIE      Birth Comments: Length: 19.5\",head circ:34.3 cm      Apgar1: 8  Apgar5: 9   3 Term 01/10/19 39w5d  3.033 " "kg (6 lb 11 oz) M  None N BARBIE   2 Term 14 39w2d  2.948 kg (6 lb 8 oz) F  None N BARBIE      Birth Comments: \"Intestinal problems for baby requiring surgery\"   1 Term 13 38w2d 09:00 2.268 kg (5 lb) F Outside Hosp None N BARBIE      Birth Comments: had ctx, SROM,no problems during preg      Name: True       No past medical history on file.    Past Surgical History:   Procedure Laterality Date     NO HISTORY OF SURGERY  2013       Current Outpatient Medications   Medication Sig Dispense Refill     ondansetron (ZOFRAN ODT) 4 MG ODT tab Take 1 tablet (4 mg) by mouth every 8 hours as needed for nausea 30 tablet 1     Prenatal Vit-Fe Fumarate-FA (PRENATAL MULTIVITAMIN W/IRON) 27-0.8 MG tablet Take 1 tablet by mouth daily 90 tablet 3     doxylamine (UNISOM SLEEPTABS) 25 MG TABS tablet Take 0.5 tablets (12.5 mg) by mouth nightly as needed (nausea) (Patient not taking: Reported on 2022) 60 tablet 0     pyridOXINE (VITAMIN B6) 25 MG tablet Take 1 tablet (25 mg) by mouth 3 times daily (Patient not taking: Reported on 2022) 90 tablet 1       Do you have a history of any of the following medical conditions?  Condition Yes/No   Hypertension No   Heart disease, mitral valve prolapse, rheumatic fever No   Asthma or another chronic lung disease No   An autoimmune disorder No   Kidney disease No   Frequent urinary tract infections No   Migraine headaches No   Stroke, loss of sensation/function, seizures, or other neuro problem No   Diabetes No   Thyroid problems or have you taken thyroid medication No   Hepatitis, liver disease, jaundice No   Blood clots, phlebitis, pulmonary embolism or varicose veins No   Excessive bleeding after surgery or dental work No   Heavy menstrual periods requiring treatment No   Anemia No   Blood transfusions No        Would you refuse a blood transfusion? No   Breast problems No   Abnormalities of the uterus No   Abnormal pap smear No   Have you been treated for an " emotional disturbance? No   Have you been physically, sexually, or emotionally hurt by someone? No   Have you been in a major accident or suffered serious trauma? No   Have you had surgical procedures? No   Have you ever had any complications from anesthesia? No   Have you ever been hospitalized for a nonsurgical reason? No   Notes for positives: N/A    Prenatal Genetic Screening  Do you have a history of any of the following Yes/No   A metabolic disorder (e.g. Insulin-dependent DM, PKU) No   Recurrent pregnancy loss No     Do you, the baby's father, or anyone in your families have Yes/No   Thalassemia AND MCV <80 No   Hemophilia No   Neural tube defect No   Congenital heart defect No   Sickle cell disease or trait No   Muscular dystrophy No   Cystic fibrosis No   Mental retardation or autism No   Down's syndrome No   Yonny-Sach's disease No   [place]'s chorea No   Any other inherited genetic or chromosomal disorder No   A child with birth defects not listed above No     Infection History  At high risk for coming in contact with HIV No   Ever treated for tuberculosis No   Ever received the BCG vaccine for tuberculosis No   Ever had genital herpes (or has your partner) No   Had a rash or viral illness since LMP No   Ever had a sexually transmitted infection No   Ever had chicken pox or the vaccine Yes   Ever had any other serious infectious disease No   Up to date with immunizations Yes   STI History neg      PERSONAL/SOCIAL HISTORY  Social History     Socioeconomic History     Marital status:      Spouse name: Ariana White     Number of children: 1     Years of education: 8th 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   Tobacco Use     Smoking status: Never Smoker     Smokeless tobacco: Never Used   Substance and Sexual Activity     Alcohol use: No     Drug use: No     Sexual activity: Yes     Partners: Male     REVIEW OF SYSTEMS  C: NEGATIVE  "for fever, chills, change in weight  E: NEGATIVE for vision changes or irritation  ENT: NEGATIVE for ear, mouth and throat problems  R: NEGATIVE for significant cough or SOB  B: NEGATIVE for masses, tenderness or discharge  CV: NEGATIVE for chest pain, palpitations or peripheral edema  GI: NEGATIVE for nausea, abdominal pain, heartburn, or change in bowel habits  : NEGATIVE for unusual urinary or vaginal symptoms.   M: NEGATIVE for significant arthralgias or myalgia  N: NEGATIVE for weakness, dizziness or paresthesias  P: NEGATIVE for changes in mood or affect    PHYSICAL EXAM:  /66 (BP Location: Right arm, Patient Position: Sitting, Cuff Size: Adult Regular)   Pulse 82   Temp 97.4  F (36.3  C) (Oral)   Ht 1.5 m (4' 11.06\")   Wt 62.1 kg (137 lb)   LMP 03/01/2022 (Approximate)   SpO2 98%   BMI 27.62 kg/m     Pregravid weight: 133lb  GENERAL:  Pleasant pregnant female, alert, well groomed.  SKIN:  Warm and dry, without lesions or rashes  EYES:  PERRLA, EOM intact  MOUTH:  Buccal mucosa pink, moist without lesions.    NECK:  Thyroid without enlargement and nodules.  No cervical lymphadenopathy.  LUNGS:  Clear to auscultation.  BREAST:  Symmetrical. No masses noted. No skin or nipple changes or axillary nodes.   HEART:  RRR without murmur.  ABDOMEN: Soft without masses , tenderness or organomegaly.   MUSCULOSKELETAL:  Full range of motion.  EXTREMITIES:  No edema. No significant varicosities.     ASSESSMENT/PLAN  Patient Active Problem List   Diagnosis     Screening for cervical cancer     Bilateral carpal tunnel syndrome     Numbness and tingling of both feet     Encounter for supervision of other normal pregnancy in third trimester       Routine prenatal labs today. CBC, type and screen, rubella, HIV, gonorrhea/chlamydia, RPR, hepatitis B, urinalysis with culture. Pap smear due, done today. Early ultrasound for dating completed at last visit.     [facility] " possible options for [facility]    Discussed:  -recommended weight gain of 15-25 lbs. for BMI of Body mass index is 27.62 kg/m ..    Will follow up in 4 weeks for routine pre-colleen care vs possible follow-up after pregnancy termination. Discussing with  and will see [facility] but still unsure what she wants to do.    Nayana Coulter MD      Precepted with: Hernan Hensley MD

## 2022-05-18 NOTE — LETTER
[facility] [address] [place] [address]  Phone: [phone]  Fax: [phone]    May 18, 2022        Sydney Dao  [facility] [place] [address]          To whom it may concern:    RE: Sydney Dao    Patient was seen and treated today at our clinic. Please excuse from work for today.    Please contact me for questions or concerns.      Sincerely,        Nayana Coulter MD

## 2022-05-19 PROBLEM — Z34.01 ENCOUNTER FOR SUPERVISION OF NORMAL FIRST PREGNANCY IN FIRST TRIMESTER: Status: ACTIVE | Noted: 2020-11-27

## 2022-05-19 LAB
HBV SURFACE AG SERPL QL IA: NONREACTIVE
RUBV IGG SERPL QL IA: 5.19 INDEX
RUBV IGG SERPL QL IA: POSITIVE
T PALLIDUM AB SER QL: NONREACTIVE

## 2022-05-20 LAB
BACTERIA UR CULT: NO GROWTH
LEAD BLDV-MCNC: <2 UG/DL

## 2022-06-03 ENCOUNTER — TELEPHONE (OUTPATIENT)
Dept: FAMILY MEDICINE | Facility: CLINIC | Age: 32
End: 2022-06-03
Payer: COMMERCIAL

## 2022-06-21 ENCOUNTER — OFFICE VISIT (OUTPATIENT)
Dept: FAMILY MEDICINE | Facility: CLINIC | Age: 32
End: 2022-06-21
Payer: COMMERCIAL

## 2022-06-21 VITALS
HEIGHT: 59 IN | BODY MASS INDEX: 28.02 KG/M2 | OXYGEN SATURATION: 98 % | WEIGHT: 139 LBS | HEART RATE: 86 BPM | RESPIRATION RATE: 20 BRPM | SYSTOLIC BLOOD PRESSURE: 110 MMHG | TEMPERATURE: 97.9 F | DIASTOLIC BLOOD PRESSURE: 72 MMHG

## 2022-06-21 DIAGNOSIS — Z34.82 ENCOUNTER FOR SUPERVISION OF OTHER NORMAL PREGNANCY IN SECOND TRIMESTER: Primary | ICD-10-CM

## 2022-06-21 PROCEDURE — 99207 PR PRENATAL VISIT: CPT | Mod: GC | Performed by: STUDENT IN AN ORGANIZED HEALTH CARE EDUCATION/TRAINING PROGRAM

## 2022-06-21 NOTE — PROGRESS NOTES
"SUBJECTIVE:  Sydney Dao is a  at 15w3d gestation by 9 week US who returns today for prenatal care. Estimated Date of Delivery: Dec 10, 2022    - Concerns today: None  - Patient reports no vaginal bleeding, no contractions/severe cramping, no leakage of fluid. Fetal movement is Absent.   - Having nausea, no vomiting. No heartburn.  N/V sx have abated and now only experiencing in the morning or when hungry. Has not needed antiemetics recently.  - No vaginal discharge. No dysuria.   - No headache, vision changes, lower extremity swelling, upper abdominal pain, chest pain, shortness of breath.   - Mood has been normal.    Going to [facility]? Yes    Sydney Dao speaks [place] so an  was used today.    OBJECTIVE:  /72   Pulse 86   Temp 97.9  F (36.6  C) (Oral)   Resp 20   Ht 1.5 m (4' 11.06\")   Wt 63 kg (139 lb)   LMP 2022 (Approximate)   SpO2 98%   BMI 28.02 kg/m    Const: No distress  Resp: CTAB  CV: RRR  Abd: Gravid, fundus measuring at 15 cm  FHT: 141 bpm   Ext: No edema    Labs today: No results found for any visits on 22.    ASSESSMENT/PLAN:  32 year old , 15w3d weeks of pregnancy with ADALI of 12/10/2022, by Ultrasound, at 9 weeks  GA.    There are no diagnoses linked to this encounter.    - Prenatal labs reviewed:   - Pregnancy complicated by: None  -(Prior to -14) Discussed first trimester genetic screening. Patient Does not want to proceed with screening.   - (Prior to 15-18 weeks) Discussed quad screen. Patient Does not want to proceed with screening.   - (Prior to 20 weeks) Discussed and ordered ultrasound for fetal survey.   - Flu shot this pregnancy: out of season, vaccine not available, recommended she get it in the fall. The patient did agree to vaccination.  - Pregnancy weight gain has been 1.814 kg (4 lb) to date, which is Adequate. Target weight gain is 25-35 lbs (pregravid BMI 18.5-24.9).  -Breastfeeding education discussed. More details in Breastfeeding " AVS  - Cue Feeding  - Supply and Demand  - Exclusivity   - Good Latch  - Avoiding Artifical Nipples  - Patient will continue taking prenatal vitamins and avoiding cigarettes & alcohol.     - Return to clinic in 4 weeks.     Options for treatment and/or follow-up care were reviewed with the patient. Sydney Dao was engaged and actively involved in the decision making process. She verbalized understanding of the options discussed and was satisfied with the final plan.    Options for treatment and follow-up care were reviewed with the patient and/or guardian. Pt and/or guardian engaged in the decision making process and verbalized understanding of the options discussed and agreed with the final plan.    Precepted today with: MD Veronique Modi MD  [facility] Resident PGY-3  [facility]

## 2022-06-21 NOTE — NURSING NOTE
Due to patient being non-English speaking/uses sign language, an  was used for this visit. Only for face-to-face interpretation by an external agency, date and length of interpretation can be found on the scanned worksheet.     name: Carolina rowe  Agency: [employer]  Language: May   Telephone number:   Type of interpretation: Face-to-face, spoken

## 2022-06-21 NOTE — PROGRESS NOTES
Preceptor Attestation:   Patient seen, evaluated and discussed with the resident. I have verified the content of the note, which accurately reflects my assessment of the patient and the plan of care.    Patient is feeling less stress, no longer contemplating . Her workplace has been very supportive and has given her accommodations for pregnancy (She assembles cash registers for work). She is excited to find out gender at her anatomy scan.     Supervising Physician:Claudia Preciado MD  [facility]

## 2022-07-20 ENCOUNTER — OFFICE VISIT (OUTPATIENT)
Dept: FAMILY MEDICINE | Facility: CLINIC | Age: 32
End: 2022-07-20
Payer: COMMERCIAL

## 2022-07-20 VITALS — BODY MASS INDEX: 28.02 KG/M2 | WEIGHT: 139 LBS | SYSTOLIC BLOOD PRESSURE: 110 MMHG | DIASTOLIC BLOOD PRESSURE: 72 MMHG

## 2022-07-20 DIAGNOSIS — Z34.82 ENCOUNTER FOR SUPERVISION OF OTHER NORMAL PREGNANCY IN SECOND TRIMESTER: Primary | ICD-10-CM

## 2022-07-20 PROCEDURE — 99207 PR PRENATAL VISIT: CPT | Mod: GC

## 2022-07-20 NOTE — PROGRESS NOTES
"HPI:  Sydney Dao is a 32 year old  female who presents to clinic for a follow up OB visit. She is currently 19w4d with an estimated date of delivery of Dec 10, 2022 via first trimester US.   She has felt baby move.     New concerns today: None    ROS:  No - Headache  No - Changes in vision  No - Chest Pain  No - Shortness of Breath  No - Nausea   No - Vomiting  No - Abdominal pain   No - Contractions  No - Dysuria   No - Vaginal Discharge    No - Vaginal bleeding   No - Loss of Fluid   No - Extremity swelling       [facility] to [facility]? Yes      OB History    Para Term  AB Living   5 4 4 0 0 4   SAB IAB Ectopic Multiple Live Births   0 0 0 0 4      # Outcome Date GA Lbr Ajay/2nd Weight Sex Delivery Anes PTL Lv   5 Current            4 Term 21 39w6d  3.402 kg (7 lb 8 oz) M   N BARBIE      Birth Comments: Length: 19.5\",head circ:34.3 cm      Apgar1: 8  Apgar5: 9   3 Term 01/10/19 39w5d  3.033 kg (6 lb 11 oz) M  None N BARBIE   2 Term 14 39w2d  2.948 kg (6 lb 8 oz) F  None N BARBIE      Birth Comments: \"Intestinal problems for baby requiring surgery\"   1 Term 13 38w2d 09:00 2.268 kg (5 lb) F Outside Hosp None N BARBIE      Birth Comments: had ctx, SROM,no problems during preg      Name: True       Physical exam:  LMP 2022 (Approximate)     General: alert, female in no acute distress  Abdomen: gravid uterus appropriate for gestation age above pubic symphysis, non-tender, FHTs 145  Extremities: no edema    Assessment/Plan:  Sydney was seen today for prenatal care and medication reconciliation.    Diagnoses and all orders for this visit:    Encounter for supervision of other normal pregnancy in second trimester    Patient doing well, without concerns today.   Fetal survey ultrasound scheduled for tomorrow, 22 -- review at next visit.  Patient is not planning to complete childbirth education classes.   Discussed pre-term labor signs and symptoms and when to call/come in. "   Continued encouragement of breastfeeding.    Weight gain adequate: 1.814 kg (4 lb) to date, out of recommended total of 15-25 lbs (pregravid BMI 25-29.9)    Return to clinic in 4 weeks.    Nayana Coulter MD  Precepted with: Manoj Bowers MD

## 2022-07-21 ENCOUNTER — ANCILLARY PROCEDURE (OUTPATIENT)
Dept: ULTRASOUND IMAGING | Facility: CLINIC | Age: 32
End: 2022-07-21
Attending: STUDENT IN AN ORGANIZED HEALTH CARE EDUCATION/TRAINING PROGRAM
Payer: COMMERCIAL

## 2022-07-21 DIAGNOSIS — Z34.82 ENCOUNTER FOR SUPERVISION OF OTHER NORMAL PREGNANCY IN SECOND TRIMESTER: ICD-10-CM

## 2022-07-21 PROCEDURE — 76805 OB US >/= 14 WKS SNGL FETUS: CPT | Mod: TC | Performed by: RADIOLOGY

## 2022-07-21 NOTE — PROGRESS NOTES
Preceptor Attestation:  Patient's case reviewed and discussed with Nayana Coulter MD resident and I evaluated the patient. I agree with written assessment and plan of care.  Supervising Physician:  Jluis Bowers MD, MD VEGA  [facility]

## 2022-07-21 NOTE — NURSING NOTE
Due to patient being non-English speaking/uses sign language, an  was used for this visit. Only for face-to-face interpretation by an external agency, date and length of interpretation can be found on the scanned worksheet.     name: Rosalio Santamaria  Agency: [employer]  Language: May   Telephone number: .  Type of interpretation: Face-to-face, spoken

## 2022-08-19 ENCOUNTER — OFFICE VISIT (OUTPATIENT)
Dept: FAMILY MEDICINE | Facility: CLINIC | Age: 32
End: 2022-08-19
Payer: COMMERCIAL

## 2022-08-19 VITALS
RESPIRATION RATE: 19 BRPM | SYSTOLIC BLOOD PRESSURE: 101 MMHG | HEIGHT: 59 IN | OXYGEN SATURATION: 100 % | TEMPERATURE: 97.9 F | DIASTOLIC BLOOD PRESSURE: 64 MMHG | WEIGHT: 146 LBS | BODY MASS INDEX: 29.43 KG/M2 | HEART RATE: 94 BPM

## 2022-08-19 DIAGNOSIS — Z34.82 ENCOUNTER FOR SUPERVISION OF OTHER NORMAL PREGNANCY IN SECOND TRIMESTER: Primary | ICD-10-CM

## 2022-08-19 PROCEDURE — 99207 PR PRENATAL VISIT: CPT | Mod: GC

## 2022-08-19 NOTE — NURSING NOTE
Due to patient being non-English speaking/uses sign language, an  was used for this visit. Only for face-to-face interpretation by an external agency, date and length of interpretation can be found on the scanned worksheet.     name: Germania  Agency: [employer]  Language: May   Telephone number: [phone]  Type of interpretation: Face-to-face, spoken

## 2022-08-19 NOTE — PROGRESS NOTES
"HPI:  Sydney Dao is a 32 year old  female who presents to clinic for a follow up OB visit. She is currently 23w6d with an estimated date of delivery of Dec 10, 2022 via first trimester US.   She has felt baby move.     New concerns today: None    ROS:  No - Headache  No - Changes in vision  No - Chest Pain  No - Shortness of Breath  No - Nausea   No - Vomiting  No - Abdominal pain   No - Contractions     No - Vaginal bleeding   No - Loss of Fluid   No - Extremity swelling       Going to [facility]? Yes      OB History    Para Term  AB Living   5 4 4 0 0 4   SAB IAB Ectopic Multiple Live Births   0 0 0 0 4      # Outcome Date GA Lbr Ajay/2nd Weight Sex Delivery Anes PTL Lv   5 Current            4 Term 21 39w6d  3.402 kg (7 lb 8 oz) M   N BARBIE      Birth Comments: Length: 19.5\",head circ:34.3 cm      Apgar1: 8  Apgar5: 9   3 Term 01/10/19 39w5d  3.033 kg (6 lb 11 oz) M  None N BARBIE   2 Term 14 39w2d  2.948 kg (6 lb 8 oz) F  None N BARBIE      Birth Comments: \"Intestinal problems for baby requiring surgery\"   1 Term 13 38w2d 09:00 2.268 kg (5 lb) F Outside Hosp None N BARBIE      Birth Comments: had ctx, SROM,no problems during preg      Name: True       Physical exam:  /64   Pulse 94   Temp 97.9  F (36.6  C)   Resp 19   Ht 1.499 m (4' 11\")   Wt 66.2 kg (146 lb)   LMP 2022 (Approximate)   SpO2 100%   BMI 29.49 kg/m      General: alert, female in no acute distress  Abdomen: gravid uterus appropriate for gestation age above pubic symphysis, non-tender, FHTs 145  Extremities: no edema    Assessment/Plan:  Sydney was seen today for prenatal care.    Diagnoses and all orders for this visit:    Encounter for supervision of other normal pregnancy in second trimester    Doing well. No concerns. Discussed continuing prenatal vitamins. Will repeat hgb at next visit along with 1 hr ggt and tetanus vaccine.    Fetal survey ultrasound done and was normal.  Patient is not " planning to complete childbirth education classes.   Discussed pre-term labor signs and symptoms and when to call/come in.   Continued encouragement of breastfeeding.    Weight gain adequate: 5.897 kg (13 lb) to date, out of recommended total of 15-25 lbs (pregravid BMI 25-29.9)    Return to clinic in 4 weeks.    Nayana Coulter MD  Precepted with: Anupama Gutierrez MD

## 2022-08-19 NOTE — LETTER
[facility] [address] [place] [address]  Phone: [phone]  Fax: [phone]    August 19, 2022        Sydney Dao  [facility] [place] [address]          To whom it may concern:    RE: Sydney Dao    Patient was seen and treated today at our clinic and missed work. Please excuse from work.     Please contact me for questions or concerns.      Sincerely,        Nayana Coulter MD

## 2022-08-20 NOTE — PROGRESS NOTES
Preceptor Attestation:  Patient's case reviewed and discussed with Nayana Coulter MD resident and I evaluated the patient. I agree with written assessment and plan of care.  Supervising Physician:  SCARLET ROBLES MD  [facility]

## 2022-09-19 ENCOUNTER — OFFICE VISIT (OUTPATIENT)
Dept: FAMILY MEDICINE | Facility: CLINIC | Age: 32
End: 2022-09-19
Payer: COMMERCIAL

## 2022-09-19 VITALS
OXYGEN SATURATION: 99 % | WEIGHT: 149.12 LBS | DIASTOLIC BLOOD PRESSURE: 67 MMHG | SYSTOLIC BLOOD PRESSURE: 102 MMHG | HEART RATE: 85 BPM | BODY MASS INDEX: 30.12 KG/M2

## 2022-09-19 DIAGNOSIS — Z34.83 ENCOUNTER FOR SUPERVISION OF OTHER NORMAL PREGNANCY, THIRD TRIMESTER: Primary | ICD-10-CM

## 2022-09-19 LAB
ERYTHROCYTE [DISTWIDTH] IN BLOOD BY AUTOMATED COUNT: 13.1 % (ref 10–15)
GLUCOSE 1H P 50 G GLC PO SERPL-MCNC: 80 MG/DL (ref 70–129)
HCT VFR BLD AUTO: 35.9 % (ref 35–47)
HGB BLD-MCNC: 11.3 G/DL (ref 11.7–15.7)
MCH RBC QN AUTO: 27.4 PG (ref 26.5–33)
MCHC RBC AUTO-ENTMCNC: 31.5 G/DL (ref 31.5–36.5)
MCV RBC AUTO: 87 FL (ref 78–100)
PLATELET # BLD AUTO: 324 10E3/UL (ref 150–450)
RBC # BLD AUTO: 4.13 10E6/UL (ref 3.8–5.2)
WBC # BLD AUTO: 9.7 10E3/UL (ref 4–11)

## 2022-09-19 PROCEDURE — 36415 COLL VENOUS BLD VENIPUNCTURE: CPT | Performed by: STUDENT IN AN ORGANIZED HEALTH CARE EDUCATION/TRAINING PROGRAM

## 2022-09-19 PROCEDURE — 99207 PR PRENATAL VISIT: CPT | Mod: GC | Performed by: STUDENT IN AN ORGANIZED HEALTH CARE EDUCATION/TRAINING PROGRAM

## 2022-09-19 PROCEDURE — 85027 COMPLETE CBC AUTOMATED: CPT | Performed by: STUDENT IN AN ORGANIZED HEALTH CARE EDUCATION/TRAINING PROGRAM

## 2022-09-19 PROCEDURE — 86780 TREPONEMA PALLIDUM: CPT | Performed by: STUDENT IN AN ORGANIZED HEALTH CARE EDUCATION/TRAINING PROGRAM

## 2022-09-19 PROCEDURE — 82950 GLUCOSE TEST: CPT | Performed by: STUDENT IN AN ORGANIZED HEALTH CARE EDUCATION/TRAINING PROGRAM

## 2022-09-19 NOTE — PROGRESS NOTES
SUBJECTIVE:  Sydney Dao is a  at 28w2d gestation by 1st trimester US who returns today for prenatal care. Estimated Date of Delivery: Dec 10, 2022     Reports that she has been feeling well overall since she was last here. Doing well with the other kids at home in general. However, has only been able to sleep 5-6 hours because of work and her baby waking up at home. Discussed getting more rest when she can.     - Concerns today: none  - Patient reports no vaginal bleeding, no contractions/severe cramping, no leakage of fluid.  Fetal movement is Present.   - some nausea/vomiting, only when she is hungry so she eats consistently to mediate this. No heartburn.   - No vaginal discharge. No dysuria.   - No headache, vision changes, lower extremity swelling, upper abdominal pain, chest pain, shortness of breath.   - Mood has been overall okay. A little bit down with depression.     Going to [facility]? Yes    Do you need help getting a car seat? No  Do you need help getting a breast pump? No    Sydney Dao speaks [employer] so an  was used today.    OBJECTIVE:  /67   Pulse 85   Wt 67.6 kg (149 lb 1.9 oz)   LMP 2022 (Approximate)   SpO2 99%   BMI 30.12 kg/m    Const: No distress  Abd: Gravid.   Fundal height 27 cm   bpm  No lower extremity edema    Labs today:   Results for orders placed or performed in visit on 22   CBC with platelets     Status: Abnormal   Result Value Ref Range    WBC Count 9.7 4.0 - 11.0 10e3/uL    RBC Count 4.13 3.80 - 5.20 10e6/uL    Hemoglobin 11.3 (L) 11.7 - 15.7 g/dL    Hematocrit 35.9 35.0 - 47.0 %    MCV 87 78 - 100 fL    MCH 27.4 26.5 - 33.0 pg    MCHC 31.5 31.5 - 36.5 g/dL    RDW 13.1 10.0 - 15.0 %    Platelet Count 324 150 - 450 10e3/uL   Glucose tolerance gest screen 1 hour     Status: Normal   Result Value Ref Range    Glu Gest Screen 1hr 50g 80 70 - 129 mg/dL    Narrative    Pt informed passed. Jared/Lab  This is a screening test for Gestational  Diabetes Mellitus.   If results are 130 mg/dL or greater, a Standard 100 gram Gestational  3 hour Glucose Tolerance should be performed.       ASSESSMENT/PLAN:  32 year old , 28w2d weeks of pregnancy with ADALI of 12/10/2022, by Ultrasound, confirmed by 9 week US.    Sydney was seen today for prenatal care.    Diagnoses and all orders for this visit:    Encounter for supervision of other normal pregnancy, third trimester  33 yo  at 28w2d. Uncomplicated pregnancy thus far. Syphillis screen today (not done in second trimester). Third tri hgb today. 1 hour GTT today (No h/o GDM). Will hold off on TDAP until next visit (data showing improved efficacy in preventing fetal pertussis if given at 32 week khoi).  -     CBC with platelets; Future  -     Syphilis Screen [place] (RPR/VDRL); Future  -     Glucose tolerance gest screen 1 hour; Future  -     CBC with platelets  -     Syphilis Screen [place] (RPR/VDRL)  -     Glucose tolerance gest screen 1 hour        - Pregnancy without complications.  - Prenatal labs reviewed.    - 2nd trimester Hgb is needed and ordered.   - Fetal survey without abnormalities; a follow up ultrasound is not required.      - Pregnancy weight gain has been 7.312 kg (16 lb 1.9 oz) to date, which is on the high end of normal. Target weight gain is 25-35 lbs (pregravid BMI 18.5-24.9).   - Counseled patient on topics as marked in the OB checklist.   - Patient will continue taking prenatal vitamins and avoiding cigarettes & alcohol.       - Return to clinic in 4 weeks.     Options for treatment and/or follow-up care were reviewed with the patient. Sydney Dao was engaged and actively involved in the decision making process. She verbalized understanding of the options discussed and was satisfied with the final plan.    Hailey Chau MS3    I was present with the medical student who participated in the service and in the documentation of this note. I have verified the history and personally  performed the physical exam and medical decision making, and have verified the content of the note, which accurately reflects my assessment of the patient and the plan of care.     Precepted with Dr. Thomas Patterson MD

## 2022-09-20 LAB — T PALLIDUM AB SER QL: NONREACTIVE

## 2022-09-20 NOTE — PROGRESS NOTES
Preceptor Attestation:  Patient's case reviewed and discussed with the resident, Zenon Patterson MD, and I personally evaluated the patient. I agree with written assessment and plan of care.    Supervising Physician:  Sabina Guzman MD   [facility]

## 2022-10-07 ENCOUNTER — TELEPHONE (OUTPATIENT)
Dept: FAMILY MEDICINE | Facility: CLINIC | Age: 32
End: 2022-10-07

## 2022-10-07 NOTE — TELEPHONE ENCOUNTER
[facility] phone call message- patient requesting form status:    Type of Form: FMLA and SHORT TERM DISABILITY    Given to:     Submitted: within 10 business days     Date Submitted: 10/7/22     Date Needed by: ASAP    Additional Comments: Patient came into office to drop off form to complete. Please call patient to  once ready for . Thank you    OK to leave a message on voice mail? Yes    Primary language: Hmong      needed? Yes    Call taken on October 7, 2022 at 1:55 PM by Jaylan Ortiz

## 2022-11-07 ENCOUNTER — OFFICE VISIT (OUTPATIENT)
Dept: FAMILY MEDICINE | Facility: CLINIC | Age: 32
End: 2022-11-07
Payer: COMMERCIAL

## 2022-11-07 VITALS
RESPIRATION RATE: 16 BRPM | TEMPERATURE: 98.6 F | BODY MASS INDEX: 30.9 KG/M2 | HEART RATE: 86 BPM | OXYGEN SATURATION: 99 % | WEIGHT: 153 LBS | SYSTOLIC BLOOD PRESSURE: 105 MMHG | DIASTOLIC BLOOD PRESSURE: 65 MMHG

## 2022-11-07 DIAGNOSIS — Z23 NEED FOR PROPHYLACTIC VACCINATION AND INOCULATION AGAINST INFLUENZA: ICD-10-CM

## 2022-11-07 DIAGNOSIS — Z34.83 ENCOUNTER FOR SUPERVISION OF OTHER NORMAL PREGNANCY, THIRD TRIMESTER: Primary | ICD-10-CM

## 2022-11-07 PROCEDURE — 90472 IMMUNIZATION ADMIN EACH ADD: CPT

## 2022-11-07 PROCEDURE — 90715 TDAP VACCINE 7 YRS/> IM: CPT

## 2022-11-07 PROCEDURE — 90686 IIV4 VACC NO PRSV 0.5 ML IM: CPT

## 2022-11-07 PROCEDURE — 59425 ANTEPARTUM CARE ONLY: CPT | Mod: GC

## 2022-11-07 PROCEDURE — 90471 IMMUNIZATION ADMIN: CPT

## 2022-11-07 NOTE — PATIENT INSTRUCTIONS
[facility] Information  If you have any further concerns or wish to schedule another appointment, please call our office at [phone] during normal business hours (8-5, M-F).     For uncomplicated pregnancies, you will be seeing your doctor once a month until you are 28 weeks, then you will see your doctor twice a month. You will begin weekly visits at 36 weeks until you deliver.     If you have urgent medical questions that cannot wait, you may call [phone] at any time of day.     If you have a medical emergency, please call [phone].    When to call or come in to the hospital  If you notice a decrease in your baby's movement.   If your begin to experience contractions that are 5 minutes or less apart and lasting for over 45 seconds, or if contractions are becoming more painful.  If you have any bleeding or leakage of fluids.   If you have a headache not resolved with tylenol, right upper abdominal pain, or sudden onset of swelling.  You know your body best. Never hesitate to call or go to the hospital if something doesn't feel right!    Preparing for the hospital  You re likely feeling anxious as your child s birth approaches. This is normal. To give yourself some peace of mind, pack a bag 3-4 weeks before your due date. Here is a list of things to remember:  Personal care items like toothbrush, hair brush, lip balm, lotion, shampoo, glasses, contacts  Nightgown, bathrobe, slippers  Several pairs of underwear  Comfortable clothes for you to wear home  Camera with new batteries  Cell phone and   Insurance information and any other paperwork needed for your hospital stay  Clothes for baby to wear home  An infant, rear-facing car seat for bringing home your baby (this is required by law)  What Is Group B Strep?   Group B strep (streptococcus) is a common bacteria. It can grow in a woman s vagina, rectum, or urinary tract. It is almost always harmless in adults. But in rare cases, a woman who has  group B strep can infect her baby during the birth. Infection can cause serious illness in the . The good news is that treating the mother during labor reduces the risk of the baby becoming infected. And if a  is infected, the infection can be treated. You will be screened for Group B strep at 35-36 weeks gestation.  Facts about group B strep   Learning more about group B strep can help you understand how testing and treatment can help. Here are some basic facts about group B strep:   It is not a sexually transmitted disease.   It is not the same as strep throat. (This is caused by group A strep.)   It often has no symptoms and may cause no problems in adults.   Group B strep can be transmitted during vaginal delivery. It cannot be passed during  (surgical) birth.   A mother with group B strep rarely infects her . (Infection occurs only about 1% to 2% of the time.)   When a mother is treated during labor and delivery, her baby almost never becomes infected.   Certain factors during pregnancy increase the risk of a baby becoming infected.  Possible effects on your baby   Group B strep can infect the blood. It can also cause inflammation of the baby s lungs, brain, or spinal cord. Long-term effects can include blindness, deafness, mental retardation, or cerebral palsy. And in rare cases, infection causes death. Infection is most often detected soon after the baby is born.   How your baby may become infected   Group B strep often lives in the vagina or rectum. If the amniotic sac breaks early, bacteria from the vagina can travel to the uterus, reaching the baby. Or, as the baby passes through the birth canal, it can come in contact with the bacteria. In rare cases, group B strep can also be passed to the baby after delivery. This is called late-onset group B strep. The source of this type of infection is not well understood. But some experts believe that it happens if the baby is exposed to  group B strep in the home, from the parents or siblings, or in the community.   What increases the risk?   Certain risk factors increase the chance that a baby will be infected. They include:   Breaking or leaking of the amniotic sac earlier than 37 weeks gestation   Labor earlier than 37 weeks gestation   Breaking of the amniotic sac more than 18 hours before labor begins   Fever during labor   A urinary tract infection with group B strep at any point in the pregnancy   A previous baby born with a group B strep infection    BaBaby Feeding in the Hospital: Information, Support and      Resources    As As you prepare for the birth of your child, you will want to consider options for feeding your baby cluding breast-feeding and/or baby formula. The American Academy of Pediatrics recommends exclusive breast-feeding for the first six months (although any amount of breast-feeding is beneficial).  However, we also understand that breast-feeding is  a personal choice and not for everyone. Whether or not you choose to breast-feed, your decision will be respected by our staff.        There are numerous benefits of breast-feeding; here are a few to consider:  Provides antibodies to protect your baby from infections and diseases  The cost: formula can cost over $1,500 per year  Convenience, no warming up or sterilizing bottles and supplies  The physical contact with breastfeeding can make babies feel secure, warm and comforted        What ever my feeding choice, what can I expect after I deliver my baby?  Your baby will usually be placed skin-to-skin immediately following birth. The skin to skin contact between you and your baby will be a special and memorable time. The bonding and attachment comforts your baby and has a positive effect on baby s brain development.   Having your baby  room in  with you also helps you start to learn your baby s body rhythms and sleep cycle.    You will also begin to learn your baby s cues  (signals) that he or she is ready to feed.        When do I start to feed my baby?         As soon as possible after your baby s birth, you will be encouraged to begin feeding. In the first couple of weeks, your baby will eat often. Breastfeeding babies usually eat at least 8 times in 24 hours. Babies fed formula usually eat at least 7 times in 24 hours.           Breast-feeding tips:  Get comfortable and use pillows for support.  Have your baby at the level of your breast, facing you,  tummy to tummy.     Touch your nipple to your baby s lips so your baby s mouth opens wide (rooting reflex).  Aim the nipple toward the roof of your baby s mouth. When your baby opens his or her mouth, pull your baby toward your breast to help your baby  latch on  to your nipple and much of the areola area.  Hand expressing your breast milk can assist with latching your baby to your breast, if needed.  Ask for help, breastfeeding may seem awkward or uncomfortable at first, this is normal. There are numerous resources available at [facility] hospitals, clinics and beyond.   If your goal is to exclusively breastfeed, avoid using any formula or artificial nipples (including bottles and pacifiers) while you and your baby are learning to breastfeed unless there is a medical reason.     Mixing breastfeeding and formula can interfere with how you begin building your milk supply. It can impact how you and your baby learn to breastfeeding together and alter the natural growth of  good  bacteria in your baby s stomach.  Breast-feeding tips (cont.)  Delay a pacifier or a bottle in the first few weeks until breastfeeding is well established. This is often around 3 weeks of age.  Ask your nurse to show you how to hand express. Breast milk can be kept in the refrigerator orfreezer for later use.     Hospital Resources:  [facility] Lactation Clinics located at [facility], [facility] and [facility]  Call:  [phone].  Inpatient support  Outpatient appointments  Telephone consultation  Breast-feeding classes available through [employer]      Online Resources:  [email or web address] sign up for free online weekly e-mail  [email or web address]  Breastfeedingmadesimple.com  [place] (La Leche League)  Normalfed.com  [facility].gov/breastfeeding  Workandpump.com    Breast-feeding Supplies & Pumps:  Talk to your insurance provider or WIC (Women, Infants and Children) to learn more about options available to you. Recent health insurance changes may include additional coverage for supplies and pumps.    Public Health:  Women, Infants and Children Nutrition program (WIC): provides breast-feeding support and education in addition to formal feeding moms. 292-OKZ-4037 or http://[email or web address].mn.us/divs/fh/wic    Family Health Home Visiting: [facility] Nurse home visits are available. Talk to your provider to see if you qualify. Most [facility] have a program available.    Additional Resources:  La Leche League is an international, nonprofit, nonsectarian organization offering information, education, and support to mothers who want to breast-feed their babies. Local groups offer phone help and monthly meetings. Visit [employer]verse[employer] or [place] and us the  Find local support  drop down menu or click on the  Resources  tab.    [place] Breastfeeding Resources: 2-374-313-BABY (2223) toll free    National Breastfeeding Help Line trained breastfeeding peer counselors can help answer common breast-feeding questions by phone. Monday-Friday: English/Azeri  0-400- 046-5436 toll free, 1-607.220.9720 (TTY)    Care Connection: 154-681-[facility] (3010)

## 2022-11-07 NOTE — PROGRESS NOTES
"Sydney Dao is a 32 year old  female who presents to clinic for a follow up OB visit. She is currently 35w2d with an estimated date of delivery of Dec 10, 2022 via early 9w5d US. She denies headache, chest pain, shortness of breath, abdominal pain/contractions, vaginal bleeding, or abnormal discharge. She has felt baby move.     New concerns today: thinks baby is bigger than previous kids     OB History    Para Term  AB Living   5 4 4 0 0 4   SAB IAB Ectopic Multiple Live Births   0 0 0 0 4      # Outcome Date GA Lbr Ajay/2nd Weight Sex Delivery Anes PTL Lv   5 Current            4 Term 21 39w6d  3.402 kg (7 lb 8 oz) M   N BARBIE      Birth Comments: Length: 19.5\",head circ:34.3 cm      Apgar1: 8  Apgar5: 9   3 Term 01/10/19 39w5d  3.033 kg (6 lb 11 oz) M  None N BARBIE   2 Term 14 39w2d  2.948 kg (6 lb 8 oz) F  None N BARBIE      Birth Comments: \"Intestinal problems for baby requiring surgery\"   1 Term 13 38w2d 09:00 2.268 kg (5 lb) F Outside Hosp None N BARBIE      Birth Comments: had ctx, SROM,no problems during preg      Name: True       Physical exam:  /65   Pulse 86   Temp 98.6  F (37  C) (Oral)   Resp 16   Wt 69.4 kg (153 lb)   LMP 2022 (Approximate)   SpO2 99%   BMI 30.90 kg/m    Weight gain adequate: 7.312 kg (16 lb 1.9 oz) to date, out of recommended total of 15-25 lbs (pregravid BMI 25-29.9)    General: alert, female in no acute distress  Abdomen: gravid uterus appropriate for gestation age at 36 cm above pubic symphysis, non-tender, FHTs 140s  Extremities: no edema    Assessment/Plan:  Patient Active Problem List   Diagnosis     Screening for cervical cancer     Bilateral carpal tunnel syndrome     Numbness and tingling of both feet     Encounter for supervision of normal first pregnancy in first trimester       TDAP given today  Influenza vaccine given   Plans for contraception, breastfeeding, circumcision reviewed: does not want " circumcision, plans to breastfeed, would like to discuss postpartum contraception with OB provider.  Discussed preferences during labor.   Reviewed potential interventions during labor including amniotomy, operative vaginal delivery and operative  delivery.   Discussed pre-term labor signs and symptoms and when to call/come in.     If you notice a decrease in your baby's movement. As a rule, if she perceives fewer than 10 movements in two hours at rest    If your begin to experience contractions that are 5 minutes or less apart and lasting for over 45 seconds, or if contractions are becoming more painful.    If you have any bleeding or leakage of fluids.     If you have a headache not resolved with tylenol, right upper abdominal pain, or sudden onset of swelling.  Pack hospital bag to prevent anxiety    Follow up in 2 weeks for routine prenatal care.    Caren Arellano MD   [facility] Resident     Precepted with: Price Cook MD

## 2022-11-17 ENCOUNTER — HOSPITAL ENCOUNTER (INPATIENT)
Facility: HOSPITAL | Age: 32
LOS: 2 days | Discharge: HOME | End: 2022-11-19
Attending: FAMILY MEDICINE | Admitting: FAMILY MEDICINE
Payer: COMMERCIAL

## 2022-11-17 DIAGNOSIS — J10.1 INFLUENZA A: Primary | ICD-10-CM

## 2022-11-17 PROBLEM — O60.00 PRETERM LABOR WITHOUT DELIVERY: Status: ACTIVE | Noted: 2022-11-17

## 2022-11-17 LAB
ABO/RH(D): NORMAL
ALBUMIN SERPL BCG-MCNC: 3.1 G/DL (ref 3.5–5.2)
ALP SERPL-CCNC: 123 U/L (ref 35–104)
ALT SERPL W P-5'-P-CCNC: 19 U/L (ref 10–35)
ANION GAP SERPL CALCULATED.3IONS-SCNC: 12 MMOL/L (ref 7–15)
ANTIBODY SCREEN: NEGATIVE
AST SERPL W P-5'-P-CCNC: 32 U/L (ref 10–35)
BASOPHILS # BLD AUTO: 0 10E3/UL (ref 0–0.2)
BASOPHILS NFR BLD AUTO: 0 %
BILIRUB SERPL-MCNC: 0.4 MG/DL
BUN SERPL-MCNC: 6.4 MG/DL (ref 6–20)
CALCIUM SERPL-MCNC: 8.2 MG/DL (ref 8.6–10)
CHLORIDE SERPL-SCNC: 102 MMOL/L (ref 98–107)
CREAT SERPL-MCNC: 0.45 MG/DL (ref 0.51–0.95)
DEPRECATED HCO3 PLAS-SCNC: 22 MMOL/L (ref 22–29)
EOSINOPHIL # BLD AUTO: 0 10E3/UL (ref 0–0.7)
EOSINOPHIL NFR BLD AUTO: 1 %
ERYTHROCYTE [DISTWIDTH] IN BLOOD BY AUTOMATED COUNT: 15.6 % (ref 10–15)
FLUAV RNA SPEC QL NAA+PROBE: POSITIVE
FLUBV RNA RESP QL NAA+PROBE: NEGATIVE
GFR SERPL CREATININE-BSD FRML MDRD: >90 ML/MIN/1.73M2
GLUCOSE SERPL-MCNC: 79 MG/DL (ref 70–99)
HCT VFR BLD AUTO: 38.4 % (ref 35–47)
HGB BLD-MCNC: 11.6 G/DL (ref 11.7–15.7)
HOLD SPECIMEN: NORMAL
IMM GRANULOCYTES # BLD: 0 10E3/UL
IMM GRANULOCYTES NFR BLD: 0 %
LYMPHOCYTES # BLD AUTO: 1.2 10E3/UL (ref 0.8–5.3)
LYMPHOCYTES NFR BLD AUTO: 26 %
MCH RBC QN AUTO: 23.6 PG (ref 26.5–33)
MCHC RBC AUTO-ENTMCNC: 30.2 G/DL (ref 31.5–36.5)
MCV RBC AUTO: 78 FL (ref 78–100)
MONOCYTES # BLD AUTO: 0.3 10E3/UL (ref 0–1.3)
MONOCYTES NFR BLD AUTO: 7 %
NEUTROPHILS # BLD AUTO: 2.9 10E3/UL (ref 1.6–8.3)
NEUTROPHILS NFR BLD AUTO: 66 %
NRBC # BLD AUTO: 0 10E3/UL
NRBC BLD AUTO-RTO: 0 /100
PLATELET # BLD AUTO: 292 10E3/UL (ref 150–450)
POTASSIUM SERPL-SCNC: 3.2 MMOL/L (ref 3.4–5.3)
PROT SERPL-MCNC: 6.5 G/DL (ref 6.4–8.3)
RBC # BLD AUTO: 4.91 10E6/UL (ref 3.8–5.2)
RSV RNA SPEC NAA+PROBE: NEGATIVE
SARS-COV-2 RNA RESP QL NAA+PROBE: NEGATIVE
SODIUM SERPL-SCNC: 136 MMOL/L (ref 136–145)
SPECIMEN EXPIRATION DATE: NORMAL
WBC # BLD AUTO: 4.5 10E3/UL (ref 4–11)

## 2022-11-17 PROCEDURE — 86901 BLOOD TYPING SEROLOGIC RH(D): CPT

## 2022-11-17 PROCEDURE — 36415 COLL VENOUS BLD VENIPUNCTURE: CPT

## 2022-11-17 PROCEDURE — G0463 HOSPITAL OUTPT CLINIC VISIT: HCPCS

## 2022-11-17 PROCEDURE — 258N000003 HC RX IP 258 OP 636

## 2022-11-17 PROCEDURE — 80053 COMPREHEN METABOLIC PANEL: CPT

## 2022-11-17 PROCEDURE — 85025 COMPLETE CBC W/AUTO DIFF WBC: CPT

## 2022-11-17 PROCEDURE — 86780 TREPONEMA PALLIDUM: CPT

## 2022-11-17 PROCEDURE — 250N000013 HC RX MED GY IP 250 OP 250 PS 637: Performed by: MASSAGE THERAPIST

## 2022-11-17 PROCEDURE — 99222 1ST HOSP IP/OBS MODERATE 55: CPT | Mod: AI

## 2022-11-17 PROCEDURE — 120N000001 HC R&B MED SURG/OB

## 2022-11-17 PROCEDURE — 87653 STREP B DNA AMP PROBE: CPT

## 2022-11-17 PROCEDURE — 86850 RBC ANTIBODY SCREEN: CPT

## 2022-11-17 PROCEDURE — 87637 SARSCOV2&INF A&B&RSV AMP PRB: CPT | Performed by: FAMILY MEDICINE

## 2022-11-17 PROCEDURE — 250N000011 HC RX IP 250 OP 636

## 2022-11-17 RX ORDER — PROCHLORPERAZINE 25 MG
25 SUPPOSITORY, RECTAL RECTAL EVERY 12 HOURS PRN
Status: DISCONTINUED | OUTPATIENT
Start: 2022-11-17 | End: 2022-11-17 | Stop reason: HOSPADM

## 2022-11-17 RX ORDER — CITRIC ACID/SODIUM CITRATE 334-500MG
30 SOLUTION, ORAL ORAL
Status: DISCONTINUED | OUTPATIENT
Start: 2022-11-17 | End: 2022-11-19 | Stop reason: HOSPADM

## 2022-11-17 RX ORDER — PROCHLORPERAZINE 25 MG
25 SUPPOSITORY, RECTAL RECTAL EVERY 12 HOURS PRN
Status: DISCONTINUED | OUTPATIENT
Start: 2022-11-17 | End: 2022-11-19 | Stop reason: HOSPADM

## 2022-11-17 RX ORDER — ONDANSETRON 4 MG/1
4 TABLET, ORALLY DISINTEGRATING ORAL EVERY 6 HOURS PRN
Status: DISCONTINUED | OUTPATIENT
Start: 2022-11-17 | End: 2022-11-17 | Stop reason: HOSPADM

## 2022-11-17 RX ORDER — NALOXONE HYDROCHLORIDE 0.4 MG/ML
0.2 INJECTION, SOLUTION INTRAMUSCULAR; INTRAVENOUS; SUBCUTANEOUS
Status: DISCONTINUED | OUTPATIENT
Start: 2022-11-17 | End: 2022-11-19 | Stop reason: HOSPADM

## 2022-11-17 RX ORDER — ONDANSETRON 2 MG/ML
4 INJECTION INTRAMUSCULAR; INTRAVENOUS EVERY 6 HOURS PRN
Status: DISCONTINUED | OUTPATIENT
Start: 2022-11-17 | End: 2022-11-19 | Stop reason: HOSPADM

## 2022-11-17 RX ORDER — METOCLOPRAMIDE 10 MG/1
10 TABLET ORAL EVERY 6 HOURS PRN
Status: DISCONTINUED | OUTPATIENT
Start: 2022-11-17 | End: 2022-11-17 | Stop reason: HOSPADM

## 2022-11-17 RX ORDER — OXYTOCIN/0.9 % SODIUM CHLORIDE 30/500 ML
340 PLASTIC BAG, INJECTION (ML) INTRAVENOUS CONTINUOUS PRN
Status: DISCONTINUED | OUTPATIENT
Start: 2022-11-17 | End: 2022-11-19 | Stop reason: HOSPADM

## 2022-11-17 RX ORDER — NALOXONE HYDROCHLORIDE 0.4 MG/ML
0.4 INJECTION, SOLUTION INTRAMUSCULAR; INTRAVENOUS; SUBCUTANEOUS
Status: DISCONTINUED | OUTPATIENT
Start: 2022-11-17 | End: 2022-11-19 | Stop reason: HOSPADM

## 2022-11-17 RX ORDER — PENICILLIN G 3000000 [IU]/50ML
3 INJECTION, SOLUTION INTRAVENOUS EVERY 4 HOURS
Status: DISCONTINUED | OUTPATIENT
Start: 2022-11-18 | End: 2022-11-18

## 2022-11-17 RX ORDER — OXYTOCIN/0.9 % SODIUM CHLORIDE 30/500 ML
100-340 PLASTIC BAG, INJECTION (ML) INTRAVENOUS CONTINUOUS PRN
Status: DISCONTINUED | OUTPATIENT
Start: 2022-11-17 | End: 2022-11-19 | Stop reason: HOSPADM

## 2022-11-17 RX ORDER — ACETAMINOPHEN 650 MG/1
650 SUPPOSITORY RECTAL EVERY 4 HOURS PRN
Status: DISCONTINUED | OUTPATIENT
Start: 2022-11-17 | End: 2022-11-19 | Stop reason: HOSPADM

## 2022-11-17 RX ORDER — GUAIFENESIN 200 MG/10ML
10 LIQUID ORAL EVERY 4 HOURS PRN
Status: DISCONTINUED | OUTPATIENT
Start: 2022-11-17 | End: 2022-11-19 | Stop reason: HOSPADM

## 2022-11-17 RX ORDER — METOCLOPRAMIDE HYDROCHLORIDE 5 MG/ML
10 INJECTION INTRAMUSCULAR; INTRAVENOUS EVERY 6 HOURS PRN
Status: DISCONTINUED | OUTPATIENT
Start: 2022-11-17 | End: 2022-11-17 | Stop reason: HOSPADM

## 2022-11-17 RX ORDER — SODIUM CHLORIDE, SODIUM LACTATE, POTASSIUM CHLORIDE, CALCIUM CHLORIDE 600; 310; 30; 20 MG/100ML; MG/100ML; MG/100ML; MG/100ML
INJECTION, SOLUTION INTRAVENOUS CONTINUOUS
Status: DISCONTINUED | OUTPATIENT
Start: 2022-11-17 | End: 2022-11-19 | Stop reason: HOSPADM

## 2022-11-17 RX ORDER — MISOPROSTOL 200 UG/1
800 TABLET ORAL
Status: DISCONTINUED | OUTPATIENT
Start: 2022-11-17 | End: 2022-11-19 | Stop reason: HOSPADM

## 2022-11-17 RX ORDER — ACETAMINOPHEN 325 MG/1
325-650 TABLET ORAL EVERY 6 HOURS PRN
COMMUNITY

## 2022-11-17 RX ORDER — PROCHLORPERAZINE MALEATE 10 MG
10 TABLET ORAL EVERY 6 HOURS PRN
Status: DISCONTINUED | OUTPATIENT
Start: 2022-11-17 | End: 2022-11-17 | Stop reason: HOSPADM

## 2022-11-17 RX ORDER — METHYLERGONOVINE MALEATE 0.2 MG/ML
200 INJECTION INTRAVENOUS
Status: DISCONTINUED | OUTPATIENT
Start: 2022-11-17 | End: 2022-11-19 | Stop reason: HOSPADM

## 2022-11-17 RX ORDER — ACETAMINOPHEN 325 MG/1
325 TABLET ORAL EVERY 4 HOURS PRN
Status: DISCONTINUED | OUTPATIENT
Start: 2022-11-17 | End: 2022-11-17

## 2022-11-17 RX ORDER — OSELTAMIVIR PHOSPHATE 75 MG/1
75 CAPSULE ORAL 2 TIMES DAILY
Status: DISCONTINUED | OUTPATIENT
Start: 2022-11-17 | End: 2022-11-17

## 2022-11-17 RX ORDER — METOCLOPRAMIDE 10 MG/1
10 TABLET ORAL EVERY 6 HOURS PRN
Status: DISCONTINUED | OUTPATIENT
Start: 2022-11-17 | End: 2022-11-19 | Stop reason: HOSPADM

## 2022-11-17 RX ORDER — HYDROXYZINE HYDROCHLORIDE 50 MG/1
50 TABLET, FILM COATED ORAL EVERY 6 HOURS PRN
Status: DISCONTINUED | OUTPATIENT
Start: 2022-11-17 | End: 2022-11-19 | Stop reason: HOSPADM

## 2022-11-17 RX ORDER — OSELTAMIVIR PHOSPHATE 75 MG/1
75 CAPSULE ORAL 2 TIMES DAILY
Status: DISCONTINUED | OUTPATIENT
Start: 2022-11-17 | End: 2022-11-19 | Stop reason: HOSPADM

## 2022-11-17 RX ORDER — OXYTOCIN 10 [USP'U]/ML
10 INJECTION, SOLUTION INTRAMUSCULAR; INTRAVENOUS
Status: DISCONTINUED | OUTPATIENT
Start: 2022-11-17 | End: 2022-11-19 | Stop reason: HOSPADM

## 2022-11-17 RX ORDER — METOCLOPRAMIDE HYDROCHLORIDE 5 MG/ML
10 INJECTION INTRAMUSCULAR; INTRAVENOUS EVERY 6 HOURS PRN
Status: DISCONTINUED | OUTPATIENT
Start: 2022-11-17 | End: 2022-11-19 | Stop reason: HOSPADM

## 2022-11-17 RX ORDER — MISOPROSTOL 200 UG/1
400 TABLET ORAL
Status: DISCONTINUED | OUTPATIENT
Start: 2022-11-17 | End: 2022-11-19 | Stop reason: HOSPADM

## 2022-11-17 RX ORDER — PENICILLIN G POTASSIUM 5000000 [IU]/1
5 INJECTION, POWDER, FOR SOLUTION INTRAMUSCULAR; INTRAVENOUS ONCE
Status: COMPLETED | OUTPATIENT
Start: 2022-11-17 | End: 2022-11-17

## 2022-11-17 RX ORDER — KETOROLAC TROMETHAMINE 30 MG/ML
30 INJECTION, SOLUTION INTRAMUSCULAR; INTRAVENOUS
Status: DISCONTINUED | OUTPATIENT
Start: 2022-11-17 | End: 2022-11-19 | Stop reason: HOSPADM

## 2022-11-17 RX ORDER — PROCHLORPERAZINE MALEATE 10 MG
10 TABLET ORAL EVERY 6 HOURS PRN
Status: DISCONTINUED | OUTPATIENT
Start: 2022-11-17 | End: 2022-11-19 | Stop reason: HOSPADM

## 2022-11-17 RX ORDER — ONDANSETRON 4 MG/1
4 TABLET, ORALLY DISINTEGRATING ORAL EVERY 6 HOURS PRN
Status: DISCONTINUED | OUTPATIENT
Start: 2022-11-17 | End: 2022-11-19 | Stop reason: HOSPADM

## 2022-11-17 RX ORDER — CARBOPROST TROMETHAMINE 250 UG/ML
250 INJECTION, SOLUTION INTRAMUSCULAR
Status: DISCONTINUED | OUTPATIENT
Start: 2022-11-17 | End: 2022-11-19 | Stop reason: HOSPADM

## 2022-11-17 RX ORDER — LIDOCAINE 40 MG/G
CREAM TOPICAL
Status: DISCONTINUED | OUTPATIENT
Start: 2022-11-17 | End: 2022-11-17 | Stop reason: HOSPADM

## 2022-11-17 RX ORDER — ACETAMINOPHEN 325 MG/1
650 TABLET ORAL EVERY 4 HOURS PRN
Status: DISCONTINUED | OUTPATIENT
Start: 2022-11-17 | End: 2022-11-19 | Stop reason: HOSPADM

## 2022-11-17 RX ORDER — ONDANSETRON 2 MG/ML
4 INJECTION INTRAMUSCULAR; INTRAVENOUS EVERY 6 HOURS PRN
Status: DISCONTINUED | OUTPATIENT
Start: 2022-11-17 | End: 2022-11-17 | Stop reason: HOSPADM

## 2022-11-17 RX ORDER — IBUPROFEN 800 MG/1
800 TABLET, FILM COATED ORAL
Status: DISCONTINUED | OUTPATIENT
Start: 2022-11-17 | End: 2022-11-19 | Stop reason: HOSPADM

## 2022-11-17 RX ORDER — BISACODYL 10 MG
10 SUPPOSITORY, RECTAL RECTAL DAILY PRN
Status: DISCONTINUED | OUTPATIENT
Start: 2022-11-19 | End: 2022-11-17 | Stop reason: HOSPADM

## 2022-11-17 RX ADMIN — OSELTAMIVIR PHOSPHATE 75 MG: 75 CAPSULE ORAL at 20:48

## 2022-11-17 RX ADMIN — OSELTAMIVIR PHOSPHATE 75 MG: 75 CAPSULE ORAL at 09:20

## 2022-11-17 RX ADMIN — PENICILLIN G POTASSIUM 5 MILLION UNITS: 5000000 POWDER, FOR SOLUTION INTRAMUSCULAR; INTRAPLEURAL; INTRATHECAL; INTRAVENOUS at 20:48

## 2022-11-17 RX ADMIN — GUAIFENESIN 10 ML: 100 SOLUTION ORAL at 20:24

## 2022-11-17 RX ADMIN — ACETAMINOPHEN 650 MG: 325 TABLET, FILM COATED ORAL at 14:03

## 2022-11-17 RX ADMIN — SODIUM CHLORIDE, POTASSIUM CHLORIDE, SODIUM LACTATE AND CALCIUM CHLORIDE 100 ML/HR: 600; 310; 30; 20 INJECTION, SOLUTION INTRAVENOUS at 14:04

## 2022-11-17 RX ADMIN — GUAIFENESIN 10 ML: 100 SOLUTION ORAL at 14:09

## 2022-11-17 ASSESSMENT — ACTIVITIES OF DAILY LIVING (ADL)
DIFFICULTY_EATING/SWALLOWING: NO
DIFFICULTY_COMMUNICATING: NO
ADLS_ACUITY_SCORE: 31
ADLS_ACUITY_SCORE: 20
ADLS_ACUITY_SCORE: 31
DRESSING/BATHING_DIFFICULTY: NO
WEAR_GLASSES_OR_BLIND: YES
FALL_HISTORY_WITHIN_LAST_SIX_MONTHS: NO
ADLS_ACUITY_SCORE: 31
ADLS_ACUITY_SCORE: 31
ADLS_ACUITY_SCORE: 20
DOING_ERRANDS_INDEPENDENTLY_DIFFICULTY: NO
VISION_MANAGEMENT: GLASSES
WALKING_OR_CLIMBING_STAIRS_DIFFICULTY: NO
ADLS_ACUITY_SCORE: 31
ADLS_ACUITY_SCORE: 31
CONCENTRATING,_REMEMBERING_OR_MAKING_DECISIONS_DIFFICULTY: NO
ADLS_ACUITY_SCORE: 31
ADLS_ACUITY_SCORE: 31
TOILETING_ISSUES: NO
ADLS_ACUITY_SCORE: 31
ADLS_ACUITY_SCORE: 20
CHANGE_IN_FUNCTIONAL_STATUS_SINCE_ONSET_OF_CURRENT_ILLNESS/INJURY: NO

## 2022-11-17 NOTE — PROGRESS NOTES
Pt to [facility] from home at 00:30 unaccompanied. Stated she has been ill at home. Symptoms include coughing and fever. Also reported decreased fetal movement and concerns about baby's welfare. Denies LOF, bleeding or pain. Monitors applied.  FHTs reassuring. VSS and afebrile. One contraction noted, mild.     Pt was asked about any feelings of sadness or hopelessness recently. She responded that she has occasionally felt sad. She said that her whole family is sick and she doesn't have any help at home. She said that she has mentioned this during her clinic visit, but no action has been taken. This writer expressed concern for her wellbeing, validated her feeling and encouraged her to talk to her MD,  and caregivers when feeling overwhelmed.    Call to Dr. Santos. Above reported. COVID and flu swab sent.    0100, Dr. Santos on unit and at bedside. FHTs reactive.

## 2022-11-17 NOTE — H&P
Maternal Admission H&P  [facility]  Date of Admission: 2022  Date of Service: 2022    Name      Sydney Dao         1990  MRN       [id number]  PCP       Nayana Coulter MD at [facility] [phone].  ________________________________________________________________________    Assessment and Plan:  32 year old  at 36w5d presenting with decreased fetal movement, URI symptoms, positive for Influenza A (vaccinated ). Now karthik persistently even after 1L IVF bolus.   - Tamiflu started    - RN to perform cervical exam in 1 hour - will likely plan on admitting pending cervical change  - Will obtain GBS swab, CBC, BMP    Discussed with Dr. Gutierrez.    Micaela Armstrong MD  [facility] Medicine Residency Program, PGY-2  Contact via [employer] harinder or pager #: [phone].    ________________________________________________________________________    Chief Complaint: dehydration.    HPI: Sydney Dao is a 32 year old woman at 36w5d, based on an Estimated Date of Delivery: Dec 10, 2022. Dating is by first trimester ultrasound. She presents with decreased fetal movement in the setting of 5 days of fatigue, fever, cough. Her other children are sick at home along with her . She has been taking tylenol. She has not been sleeping due to illness and needing to take care of her other children. She has some upper abdominal pain which she attributes to coughing, right in the epigastric region/lower chest. Endorses headache, no vision changes or leg swelling. Some shortness of breath. Got about 2 hours of sleep overnight. Past labors have been very quick.     Patient Active Problem List    Diagnosis Date Noted     Encounter for supervision of normal first pregnancy in first trimester 2020     Priority: Medium     Delivering resident: Cece Sheppard       Bilateral carpal tunnel syndrome 2019     Priority: Medium      "Numbness and tingling of both feet 2019     Priority: Medium     Screening for cervical cancer 2017     Priority: Medium     1. Result is:   Normal cytology, no HPV cotesting  2. Date next is due: 3 Years  3. Follow up colposcopy is: Not indicated            OB History    Para Term  AB Living   5 4 4 0 0 4   SAB IAB Ectopic Multiple Live Births   0 0 0 0 4      # Outcome Date GA Lbr Ajay/2nd Weight Sex Delivery Anes PTL Lv   5 Current            4 Term 21 39w6d  3.402 kg (7 lb 8 oz) M   N BARBIE      Birth Comments: Length: 19.5\",head circ:34.3 cm      Apgar1: 8  Apgar5: 9   3 Term 01/10/19 39w5d  3.033 kg (6 lb 11 oz) M  None N BARBIE   2 Term 14 39w2d  2.948 kg (6 lb 8 oz) F  None N BARBIE      Birth Comments: \"Intestinal problems for baby requiring surgery\"   1 Term 13 38w2d 09:00 2.268 kg (5 lb) F Outside Hosp None N BARBIE      Birth Comments: had ctx, SROM,no problems during preg      Name: Sealily     Review of Systems Negative except what is noted in HPI.     History reviewed. No pertinent past medical history.     Past Surgical History:   Procedure Laterality Date     NO HISTORY OF SURGERY  2013   No known drug allergy  Family History   Problem Relation Age of Onset     Family History Negative Other      Cancer No family hx of      Diabetes No family hx of      Coronary Artery Disease No family hx of      Heart Disease No family hx of      Hypertension No family hx of      Asthma No family hx of      Social History     Socioeconomic History     Marital status:      Spouse name: Ariana White     Number of children: 1     Years of education: 8th [place]     Highest education level: Not on file   Occupational History     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 Not Asked   Social History Narrative     Not on file     Social Determinants of Health     Financial Resource Strain: Not on file   Food Insecurity: Not on file   Transportation Needs: Not on file   Physical Activity: Not on file   Stress: Not on file   Social Connections: Not on file   Intimate Partner Violence: Not on file   Housing Stability: Not on file     Prior to Admission Medication List  Medications Prior to Admission   Medication Sig Dispense Refill Last Dose     acetaminophen (TYLENOL) 325 MG tablet Take 325-650 mg by mouth every 6 hours as needed for mild pain        Prenatal Vit-Fe Fumarate-FA (PRENATAL MULTIVITAMIN W/IRON) 27-0.8 MG tablet Take 1 tablet by mouth daily (Patient not taking: Reported on 7/20/2022) 90 tablet 3       Allergies  Allergies   Allergen Reactions     No Known Drug Allergy      Immunization History   Administered Date(s) Administered     COVID-19,PF,Moderna 12/17/2021     COVID-19,PF,Pfizer (12+ Yrs) 04/20/2021, 05/14/2021     Flu, Unspecified 11/10/2018, 11/01/2020     Hepatitis B Immunity: Titer 01/15/2014     Influenza Vaccine IM > 6 months Valent IIV4 (Alfuria,Fluzone) 10/02/2017, 10/18/2018, 10/29/2020, 11/07/2022     Influenza Vaccine, 6+MO IM (QUADRIVALENT W/PRESERVATIVES) 11/20/2013, 01/20/2017     Rubella 01/15/2014     TDAP Vaccine (Boostrix) 10/18/2018     Td (Adult), Adsorbed 10/11/2004     Tdap (Adacel,Boostrix) 12/28/2012, 01/06/2021, 11/07/2022     Varicella Immunity: Titer/MD Dx 01/15/2014      Physical Exam  Patient Vitals for the past 24 hrs:   BP Temp Temp src Pulse Resp SpO2 Height Weight   11/17/22 0045 109/75 98.5  F (36.9  C) Oral 90 18 97 % 1.524 m (5') 68.9 kg (152 lb)     Wt Readings from Last 1 Encounters:   11/17/22 68.9 kg (152 lb)   Prepregnancy: 60.3 kg (133 lb), BMI 25.97. Total gain: 8.618 kg (19 lb) (expected gain: 7 kg (15  lb)-11.5 kg (25 lb)).    HEART: RRR, no murmur  LUNGS: CTA bilaterally  Fetal Heart Tones: Baseline 125 bpm, variability moderate (6-25 bpm), no decelerations. Reactive.  CONTRACTIONS:  irregular, every 3-9 minutes.  CERVIX: dilation 4 cm , 50% effaced, soft, and -2 station. Per RN exam  FLUID: None noted.  Fetal Presentation: vertex.    Labs  Admission on 11/17/2022   Component Date Value Ref Range Status     Influenza A PCR 11/17/2022 Positive (A)  Negative Final     Influenza B PCR 11/17/2022 Negative  Negative Final     RSV PCR 11/17/2022 Negative  Negative Final     SARS CoV2 PCR 11/17/2022 Negative  Negative Final    NEGATIVE: SARS-CoV-2 (COVID-19) RNA not detected, presumed negative.      No results found for: GBPCRT     Antibody Screen   Date Value Ref Range Status   05/18/2022 Negative Negative Final     Hepatitis B Surface Antigen   Date Value Ref Range Status   05/18/2022 Nonreactive Nonreactive Final     Chlamydia trachomatis   Date Value Ref Range Status   04/08/2022 Negative Negative Final     Comment:     A negative result by transcription mediated amplification does not preclude the presence of C. trachomatis infection because results are dependent on proper and adequate collection, absence of inhibitors and sufficient rRNA to be detected.     Neisseria gonorrhoeae   Date Value Ref Range Status   04/08/2022 Negative Negative Final     Comment:     Negative for N. gonorrhoeae rRNA by transcription mediated amplification. A negative result by transcription mediated amplification does not preclude the presence of C. trachomatis infection because results are dependent on proper and adequate collection, absence of inhibitors and sufficient rRNA to be detected.     Hemoglobin   Date Value Ref Range Status   09/19/2022 11.3 (L) 11.7 - 15.7 g/dL Final

## 2022-11-17 NOTE — PROGRESS NOTES
Pt called, stated she is feeling shortness of breath. VSS. FHTs WNL. Influenza A result noted. Dr. Santos updated.

## 2022-11-17 NOTE — PROGRESS NOTES
LABOR PROGRESS NOTE  2022 5:40 PM    SUBJECTIVE:  Patient resting through contractions. No new concerns. No gush of fluids.    OBJECTIVE:  /68   Pulse 90   Temp 97.9  F (36.6  C) (Oral)   Resp 16   Ht 1.524 m (5')   Wt 68.9 kg (152 lb)   LMP 2022 (Approximate)   SpO2 97%   BMI 29.69 kg/m    FHR: Category I - baseline FHR of 110 to 160 bpm, moderate FHR variability, and Absence of late or variable FHR decelerations  Uterine Contractions:  irregular, every 2-6 minutes.  Cervix: dilation 6 cm , 60-70% effaced, and -2 station.  Fluid: None noted.  Fetal Presentation: vertex.    ASSESSMENT & PLAN:   32 year old  at 36w5d. Found to be influenza positive and then began karthik. Karthik every 2-6 minutes.    Anticipate     Initiate intrapartum orders    GBS pending       Nayana Coulter MD, M.D.  [facility]  2022 5:40 PM

## 2022-11-17 NOTE — PROGRESS NOTES
LABOR PROGRESS NOTE  2022 1:33 PM    SUBJECTIVE:  Still endorsing flu like symptoms. Starting to have increased back pain with contractions.     OBJECTIVE:  /75 (BP Location: Right arm, Patient Position: Semi-Elizondo's, Cuff Size: Adult Regular)   Pulse 90   Temp 98.5  F (36.9  C) (Oral)   Resp 18   Ht 1.524 m (5')   Wt 68.9 kg (152 lb)   LMP 2022 (Approximate)   SpO2 97%   BMI 29.69 kg/m    FHR: Category I - baseline FHR of 110 to 160 bpm, moderate FHR variability, and Absence of late or variable FHR decelerations  Uterine Contractions:  regular, every 2-5 minutes.  Cervix: dilation 5 cm , 50% effaced, and -2 station.  Fluid: None noted.  Fetal Presentation: vertex.    ASSESSMENT & PLAN:   32 year old  at 36w5d. Came in for decreased fetal movement. Found to be influenza positive and then began karthik. Now karthik every 2-5 minutes, feeling increased back labor. Cervix unchanged from last check however given regular contractions and increased pain will continue to monitor. No further interventions.    GBS swab collected    Tamiflu BID    Fluids     Tylenol PRN    Anticipate     Completed by:   Nayana Coulter MD, M.D.  [facility]  2022 1:33 PM

## 2022-11-17 NOTE — PROGRESS NOTES
OBSTETRICS TRIAGE ASSESSMENT NOTE  Sydney Dao is a 32 year old  at 36w5d gestation based on dating ultrasound who has presented to maternity care for further evaluation of decreased fetal movement. No bleeding, leakage of fluids, contractions.     She has been ill with cough and fever for the past 5 days. Her  and other kids are all sick at home. She is taking tylenol to manage fever at home.     Does report some upper-belly pain, but it is not right sided and she thinks it is from coughing. Does have a headache, but no vision changes. No leg swelling.     PRENATAL CARE  Seen by Dr. Coulter at [facility].         PAST MEDICAL HISTORY  History reviewed. No pertinent past medical history.    PAST SURGICAL HISTORY   Past Surgical History:   Procedure Laterality Date     NO HISTORY OF SURGERY  2013       MEDICATIONS    Current Facility-Administered Medications:      acetaminophen (TYLENOL) tablet 650 mg, 650 mg, Oral, Q4H PRN **OR** acetaminophen (TYLENOL) Suppository 650 mg, 650 mg, Rectal, Q4H PRN, Obed Santos MD     acetaminophen (TYLENOL) tablet 325 mg, 325 mg, Oral, Q4H PRN, Anupama Gutierrez MD     benzocaine-menthol (CEPACOL) 15-3.6 MG lozenge 1 lozenge, 1 lozenge, Buccal, Q1H PRN, Obed Santos MD     guaiFENesin (ROBITUSSIN) 20 mg/mL solution 10 mL, 10 mL, Oral, Q4H PRN, Obed Santos MD     hydrOXYzine (ATARAX) tablet 50 mg, 50 mg, Oral, Q6H PRN **OR** hydrOXYzine (ATARAX) tablet 50 mg, 50 mg, Oral, Q6H PRN, Anupama Gutierrez MD     lidocaine (LMX4) cream, , Topical, Q1H PRN, Anupama Gutierrez MD     lidocaine 1 % 0.1-1 mL, 0.1-1 mL, Other, Q1H PRN, Anupama Gutierrez MD     sodium chloride (PF) 0.9% PF flush 3 mL, 3 mL, Intracatheter, Q8H, Anupama Gutierrez MD     sodium chloride (PF) 0.9% PF flush 3 mL, 3 mL, Intracatheter, q1 min prn, Anupama Gutierrez MD    SOCIAL HISTORY:   Social History     Socioeconomic History     Marital status:      Spouse  name: Ariana White     Number of children: 1     Years of education: 8th [place]     Highest education level: Not on file   Occupational History     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 Not Asked   Social History Narrative     Not on file     Social Determinants of Health     Financial Resource Strain: Not on file   Food Insecurity: Not on file   Transportation Needs: Not on file   Physical Activity: Not on file   Stress: Not on file   Social Connections: Not on file   Intimate Partner Violence: Not on file   Housing Stability: Not on file       PHYSICAL EXAMINATION   /75 (BP Location: Right arm, Patient Position: Semi-Elizondo's, Cuff Size: Adult Regular)   Pulse 90   Temp 98.5  F (36.9  C) (Oral)   Resp 18   Ht 1.524 m (5')   Wt 68.9 kg (152 lb)   LMP 2022 (Approximate)   SpO2 97%   BMI 29.69 kg/m    Gen: appears uncomfortable   HEENT: both eyes are injected  CV: regular rate and rhythm without murmur  Lungs: clear to ausculation, good air movement throughout  Abdomen: Gravid, non-tender  Extremities: no lower extremity edema    FETAL HEART MONITORING   Category 1 strip    CONTRACTIONS  1 contraction in 20 minutes     LAB RESULTS  Personally reviewed.  No results found for this or any previous visit (from the past 24 hour(s)).    ASSESSMENT/PLAN:   32 year old  at 36w5d gestation presenting to labor & delivery for decreased fetal movements and URI.  NST reassuring. Does report headache, but suspect this is related to her URI and not preeclampsia with normal BP, no vision changes, leg swelling, right upper quadrant pain. Will monitor overnight.   1. Tylenol for any fevers  2. Honey,  cepachol lozenges or robitussin for cough   3. FHR monitor for 1 hour, if continues to have reassuring strip ok to discontinue   4. Rest- will plan to keep overnight to allow her to rest as entire family is sick at home  5. encourage PO liquids  6. covid and flu swabs sent       Options for treatment and follow-up care were reviewed with the patient and/or guardian. Pt and/or guardian engaged in the decision making process and verbalized understanding of the options discussed and agreed with the final plan.    Precepted in AM with: Dr. Angie Santos MD, PGY2  [facility]   Pgr: [phone]

## 2022-11-18 LAB
GP B STREP DNA SPEC QL NAA+PROBE: NEGATIVE
T PALLIDUM AB SER QL: NONREACTIVE

## 2022-11-18 PROCEDURE — 120N000001 HC R&B MED SURG/OB

## 2022-11-18 PROCEDURE — 250N000013 HC RX MED GY IP 250 OP 250 PS 637: Performed by: MASSAGE THERAPIST

## 2022-11-18 PROCEDURE — 250N000011 HC RX IP 250 OP 636

## 2022-11-18 PROCEDURE — 250N000009 HC RX 250

## 2022-11-18 PROCEDURE — 10907ZC DRAINAGE OF AMNIOTIC FLUID, THERAPEUTIC FROM PRODUCTS OF CONCEPTION, VIA NATURAL OR ARTIFICIAL OPENING: ICD-10-PCS | Performed by: FAMILY MEDICINE

## 2022-11-18 PROCEDURE — 722N000001 HC LABOR CARE VAGINAL DELIVERY SINGLE

## 2022-11-18 PROCEDURE — 59410 OBSTETRICAL CARE: CPT | Mod: GC

## 2022-11-18 PROCEDURE — 99207 PR NO CHARGE LOS: CPT | Performed by: FAMILY MEDICINE

## 2022-11-18 RX ADMIN — Medication 100 ML/HR: at 05:10

## 2022-11-18 RX ADMIN — ACETAMINOPHEN 650 MG: 325 TABLET, FILM COATED ORAL at 06:13

## 2022-11-18 RX ADMIN — ACETAMINOPHEN 650 MG: 325 TABLET, FILM COATED ORAL at 16:54

## 2022-11-18 RX ADMIN — OSELTAMIVIR PHOSPHATE 75 MG: 75 CAPSULE ORAL at 08:52

## 2022-11-18 RX ADMIN — PENICILLIN G 3 MILLION UNITS: 3000000 INJECTION, SOLUTION INTRAVENOUS at 00:40

## 2022-11-18 RX ADMIN — OSELTAMIVIR PHOSPHATE 75 MG: 75 CAPSULE ORAL at 21:23

## 2022-11-18 ASSESSMENT — ACTIVITIES OF DAILY LIVING (ADL)
ADLS_ACUITY_SCORE: 21
ADLS_ACUITY_SCORE: 21
ADLS_ACUITY_SCORE: 20
ADLS_ACUITY_SCORE: 20
ADLS_ACUITY_SCORE: 21
ADLS_ACUITY_SCORE: 21
ADLS_ACUITY_SCORE: 20
ADLS_ACUITY_SCORE: 20
ADLS_ACUITY_SCORE: 21
ADLS_ACUITY_SCORE: 21
ADLS_ACUITY_SCORE: 20
ADLS_ACUITY_SCORE: 21

## 2022-11-18 NOTE — PROGRESS NOTES
LABOR PROGRESS NOTE  2022 8:40 PM    SUBJECTIVE:  Patient resting. No new concerns. Not feeling increased pressure in bottom. Still noting mild pain with contraction on RLQ. No questions. Interested in AROM in the future following penicillin.    OBJECTIVE:  /68   Pulse 90   Temp 97.9  F (36.6  C) (Oral)   Resp 16   Ht 1.524 m (5')   Wt 68.9 kg (152 lb)   LMP 2022 (Approximate)   SpO2 97%   BMI 29.69 kg/m    FHR: Category I - baseline FHR of 110 to 160 bpm, moderate FHR variability, and Absence of late or variable FHR decelerations  Uterine Contractions:  regular, every 3-5 minutes.  Cervix: dilation 6-7 cm , 60-70% effaced, and -2 station.  Fluid: None noted.  Fetal Presentation: vertex.    ASSESSMENT & PLAN:   32 year old  at 36w5d. Found to be influenza positive and then began karthik. Karthik every 3-6 minutes. Finally able to get in touch with lab who said turn around time for GBS swabs was at least a day. So will start penicillin now.      Anticipate     Routine IP orders    Start penicillin in setting of patient being  and not having GBS results back prior to delivery.      Nayana Coulter MD, M.D.  [facility]  2022 8:40 PM

## 2022-11-18 NOTE — L&D DELIVERY NOTE
OB Vaginal Delivery Note    Sydney Dao MRN# [id number]   Age: 32 year old YOB: 1990       GA: 36w6d  GP:   Labor Complications: None   EBL:  250 mL  Delivery QBL:    Delivery Type: Vaginal, Spontaneous   ROM to Delivery Time: (Delivered) Hours: 4 Minutes: 26   Weight:     1 Minute 5 Minute 10 Minute   Apgar Totals: 9   9        RICKIE SUGGS;HERNANDEZ CAROLINA;WENDY NUNEZ     Delivery Details:  Sydney Dao, a 32 year old  female delivered a viable infant with apgars of 9  and 9 . Patient was fully dilated and pushing after  11 hours  in active labor. Pushed once and delivered healthy baby boy with spontaneous cry. Delivery was via vaginal, spontaneous  to a sterile field under none  anesthesia. Infant delivered in vertex  left  occiput  anterior  position. Anterior and posterior shoulders delivered without difficulty. The cord was clamped, cut twice and 3 vessels  were noted. Cord blood was obtained in routine fashion with the following disposition: lab .      Cord complications: none   Placenta delivered at 2022  5:09 AM . Placental disposition was Hospital disposal . Fundal massage performed and fundus found to be firm.     Episiotomy: none    Perineum, vagina, cervix were inspected, and the following lacerations were noted:   Perineal lacerations: 1st  non-bleeding superficial                  Excellent hemostasis was noted. Needle count correct. Infant and patient in delivery room in good and stable condition.        Saira Dao-Sydney [[id number]]    Labor Event Times    Labor onset date: 22 Onset time:  7:15 AM   Dilation complete date: 22 Complete time:  5:03 AM   Start pushing date/time: 2022 0503      Labor Events     labor?: No   steroids: None  Labor Type: AROM, Spontaneous  Predominate monitoring during 1st stage: continuous electronic fetal monitoring     Antibiotics received during labor?: Yes  Reason for Antibiotics:  GBS  Antibiotics received for GBS: Penicillin  Antibiotics Given (GBS): Greater than 4 hours prior to delivery     Rupture date/time: 22 0038   Rupture type: Artificial Rupture of Membranes  Fluid color: Clear  Fluid odor: Normal     Augmentation: AROM  Indications for augmentation: Ineffective Contraction Pattern  1:1 continuous labor support provided by?: RN       Delivery/Placenta Date and Time    Delivery Date: 22 Delivery Time:  5:04 AM   Placenta Date/Time: 2022  5:09 AM  Oxytocin given at the time of delivery: after delivery of baby  Delivering clinician: Nayana Coulter MD   Other personnel present at delivery:  Provider Role   Marizol Castañeda, RN Delivery Nurse   Bonita Maxwell, RN Delivery Nurse   Bert Mike MD Physician         Vaginal Counts     Initial count performed by 2 team members:  Two Team Members   Dr. Yfn maxwell, RN       Needles Suture Needles Sponges (RETIRED) Instruments   Initial counts 0 0 5    Added to count 0 0 0    Relief counts       Final counts 0 0 5          Placed during labor Accounted for at the end of labor   FSE NA NA   IUPC NA NA   Cervidil NA NA              Final count performed by 2 team members:  Two Team Members   yfn maxwell      Final count correct?: Yes     Apgars     1 Minute 5 Minute 10 Minute 15 Minute 20 Minute   Skin color: 1  1       Heart rate: 2  2       Reflex irritability: 2  2       Muscle tone: 2  2       Respiratory effort: 2  2       Total: 9  9       Apgars assigned by: MARIZOL CASTAÑEDA RN     Cord    Vessels: 3 Vessels    Cord Complications: None               Cord Blood Disposition: Lab    Gases Sent?: No    Delayed cord clamping?: Yes    Cord Clamping Delay (seconds):  seconds    Stem cell collection?: No        Resuscitation    Methods: None     Skin to Skin and Feeding Plan    Skin to skin initiation date/time: 1841    Skin to skin with: Mother  Skin to skin end date/time:        Labor Events  and Shoulder Dystocia    Fetal Tracing Prior to Delivery: Category 1  Shoulder dystocia present?: Neg     Delivery (Maternal) (Provider to Complete) ([id number])    Episiotomy: None  Perineal lacerations: 1st Repaired?: No   Repair suture: None  Genital tract inspection done: Pos     Blood Loss  Mother: Sydney Dao #[id number]   Start of Mother's Information    Delivery Blood Loss  11/17/22 0715 - 11/18/22 0544    None           End of Mother's Information  Mother: Sydney Dao #[id number]          Delivery - Provider to Complete ([id number])    Delivering clinician: Nayana Coulter MD  Attempted Delivery Types (Choose all that apply): Spontaneous Vaginal Delivery  Delivery Type (Choose the 1 that will go to the Birth History): Vaginal, Spontaneous                   Other personnel:  Provider Role   Marizol Castañeda, RN Delivery Nurse   Bonita Maxwell, RN Delivery Nurse   Bert Mike MD Physician                Placenta    Date/Time: 11/18/2022  5:09 AM  Removal: Spontaneous  Disposition: Hospital disposal           Anesthesia    Method: None                Presentation and Position    Presentation: Vertex    Position: Left Occiput Anterior                 Nayana Coulter MD

## 2022-11-18 NOTE — PROGRESS NOTES
Postpartum, Vaginal Delivery    Subjective:  The patient feels well. The patient has no emotional concerns. Pain is well controlled with current medications. The patient is ambulating well. She is tolerating a normal diet,  voiding and passing flatus.The amount and color of the lochia is appropriate for the duration of recovery, patient denies clots. The baby is well. Breast and bottle feeding, going well.     Objective   /66   Pulse 90   Temp 98.1  F (36.7  C) (Oral)   Resp 16   Ht 1.524 m (5')   Wt 68.9 kg (152 lb)   LMP 2022 (Approximate)   SpO2 97%   Breastfeeding Unknown   BMI 29.69 kg/m    General: in no acute distress  Abdomen: soft, NT, fundus below umbilicus and firm  : deferred  Ext: no edema  Skin: good color    Impression:   32 year old  female who delivered via spontaneous vaginal delivery at 36w6d, currently doing well postpartum. Influenza positive. On Tamiflu.    Plan:   -continue current care  - continue Tamiflu  -anticipate home tomorrow   -contraception TBD  -f/u in 6 weeks with Dr. Yfn Arellano MD  [facility]     Precepted patient with Dr. Rosenstein.

## 2022-11-18 NOTE — PROGRESS NOTES
LABOR PROGRESS NOTE  2022 12:43 AM    SUBJECTIVE:  Patient resting for last 4 hours. No change in contraction pain. Endorsing ongoing coughing with flu. Patient interested in AROM at this time.     OBJECTIVE:  /68   Pulse 90   Temp 97.9  F (36.6  C) (Oral)   Resp 16   Ht 1.524 m (5')   Wt 68.9 kg (152 lb)   LMP 2022 (Approximate)   SpO2 97%   BMI 29.69 kg/m    FHR: Category I - baseline FHR of 110 to 160 bpm, moderate FHR variability, and Absence of late or variable FHR decelerations  Cervix: dilation 6-7 cm , 60-70% effaced, soft, and -2 station.  Fluid: Clear following AROM.  Fetal Presentation: vertex.    ASSESSMENT & PLAN:   32 year old  at 36w6d. Here with  labor in setting of influenza positive. Patient was started on penicillin 4 hours ago for GBS unknown prophylaxis in setting of  labor. Patient was thus AROM'ed for augmentation-- patient and baby tolerated procedure well. Return of clear fluid.      Anticipate     Completed AROM      Nayana Coulter MD, M.ASHLEY.  [facility]  2022 12:43 AM

## 2022-11-18 NOTE — PLAN OF CARE
Problem: Postpartum (Vaginal Delivery)  Goal: Hemostasis  Outcome: Progressing  Blood pressure slightly low 89/62 (will recheck); bleeding is light; fundus is firm at U/1.    Problem: Postpartum (Vaginal Delivery)  Goal: Optimal Pain Control and Function  Outcome: Progressing  Sydney denies pain. She is aware that Tylenol and Advil are available prn.     Problem: Plan of Care - These are the overarching goals to be used throughout the patient stay.    Goal: Optimal Comfort and Wellbeing  Intervention: Provide Person-Centered Care   care explained   choices provided   questions answered   questions encouraged   reassurance provided   thoughts/feelings acknowledged      Problem: Postpartum (Vaginal Delivery)  Goal: Successful Maternal Role Transition  Outcome: Progressing  Ratna is attentive to her 's cares and feedings. Holding/cuddling observed.

## 2022-11-19 VITALS
WEIGHT: 152 LBS | TEMPERATURE: 97.6 F | RESPIRATION RATE: 16 BRPM | DIASTOLIC BLOOD PRESSURE: 79 MMHG | SYSTOLIC BLOOD PRESSURE: 103 MMHG | HEART RATE: 74 BPM | BODY MASS INDEX: 29.84 KG/M2 | OXYGEN SATURATION: 98 % | HEIGHT: 60 IN

## 2022-11-19 PROCEDURE — 250N000013 HC RX MED GY IP 250 OP 250 PS 637: Performed by: MASSAGE THERAPIST

## 2022-11-19 PROCEDURE — 99207 PR NO CHARGE LOS: CPT | Performed by: FAMILY MEDICINE

## 2022-11-19 RX ORDER — OSELTAMIVIR PHOSPHATE 75 MG/1
75 CAPSULE ORAL 2 TIMES DAILY
Qty: 5 CAPSULE | Refills: 0 | Status: SHIPPED | OUTPATIENT
Start: 2022-11-19 | End: 2024-07-11

## 2022-11-19 RX ADMIN — ACETAMINOPHEN 650 MG: 325 TABLET, FILM COATED ORAL at 01:06

## 2022-11-19 RX ADMIN — OSELTAMIVIR PHOSPHATE 75 MG: 75 CAPSULE ORAL at 08:29

## 2022-11-19 ASSESSMENT — ACTIVITIES OF DAILY LIVING (ADL)
ADLS_ACUITY_SCORE: 20
ADLS_ACUITY_SCORE: 21
ADLS_ACUITY_SCORE: 20
ADLS_ACUITY_SCORE: 21
ADLS_ACUITY_SCORE: 21
ADLS_ACUITY_SCORE: 20
ADLS_ACUITY_SCORE: 21
ADLS_ACUITY_SCORE: 20
ADLS_ACUITY_SCORE: 20

## 2022-11-19 NOTE — DISCHARGE SUMMARY
OB Discharge Summary    Date:  2022    Name:  Sydney Dao  :  1990  MRN:  [id number]    Admission Date:  2022  Delivery Date:  2022  5:04 AM   Gestational Age at Delivery:  36w6d  Discharge Date:      Principal Diagnosis:    Problem List Items Addressed This Visit        Respiratory    Influenza A - Primary    Relevant Medications    oseltamivir (TAMIFLU) 75 MG capsule     Other Diagnosis:  Influenza    Conditions complicating Pregnancy: Other Complications:  Influenza  Medication/Vaccination:  Tamiflu+    Procedure(s) Performed:       Condition at Discharge:  good    Examination:  /79 (BP Location: Right arm, Patient Position: Semi-Elizondo's, Cuff Size: Adult Regular)   Pulse 74   Temp 97.6  F (36.4  C) (Oral)   Resp 16   Ht 1.524 m (5')   Wt 68.9 kg (152 lb)   LMP 2022 (Approximate)   SpO2 98%   Breastfeeding Unknown   BMI 29.69 kg/m     Gen: alert, normal interactions and behaviors  Cor: RRR  Lungs: clear throughout  ABD: Fundus firm below umbilicus    Discharge Medications:      Review of your medicines      START taking      Dose / Directions   oseltamivir 75 MG capsule  Commonly known as: TAMIFLU  Indication: Flu      Dose: 75 mg  Take 1 capsule (75 mg) by mouth 2 times daily  Quantity: 5 capsule  Refills: 0     prenatal multivitamin w/iron 27-0.8 MG tablet  Used for: Pregnancy test positive      Dose: 1 tablet  Take 1 tablet by mouth daily  Quantity: 90 tablet  Refills: 3        CONTINUE these medicines which have NOT CHANGED      Dose / Directions   acetaminophen 325 MG tablet  Commonly known as: TYLENOL  Indication: Fever      Dose: 325-650 mg  Take 325-650 mg by mouth every 6 hours as needed for mild pain  Refills: 0           Where to get your medicines      These medications were sent to [id number] - [place], [place] - [address], [place] [address]    Phone: [phone]      oseltamivir 75 MG capsule       Discharge Plan:    Follow up with /BETTINA: 2 weeks for contraception discussion (she thinks nexplanon) then 6 weeks    Patient Instructions:      Physical activity: Regular    Diet:  Normal    Medication:  Ibuprofen as needed    Other: Continue Tamiflu for 3 more days for 5 days total    Physician: Nayana Coulter MD    Precepted patient with Dr. Herminia Spain.

## 2022-11-19 NOTE — PLAN OF CARE
Problem: Plan of Care - These are the overarching goals to be used throughout the patient stay.    Goal: Plan of Care Review  Description: The Plan of Care Review/Shift note should be completed every shift.  The Outcome Evaluation is a brief statement about your assessment that the patient is improving, declining, or no change.  This information will be displayed automatically on your shift note.  Outcome: Met     Problem: Plan of Care - These are the overarching goals to be used throughout the patient stay.    Goal: Optimal Comfort and Wellbeing  Outcome: Met     Problem: Postpartum (Vaginal Delivery)  Goal: Successful Maternal Role Transition  Outcome: Met     Problem: Postpartum (Vaginal Delivery)  Goal: Hemostasis  Outcome: Met     Problem: Postpartum (Vaginal Delivery)  Goal: Optimal Pain Control and Function  Outcome: Met     Problem: Postpartum (Vaginal Delivery)  Goal: Absence of Infection Signs and Symptoms  Outcome: Met     Problem: Breastfeeding  Goal: Effective Breastfeeding  Outcome: Met     Sydney's VSS. Discharge instructions given to mother. Mother states understanding and has no further questions or concerns. Mother was instructed to call provider to schedule a follow up in 2 weeks and 6 weeks. Mother states understanding. Mother's bleeding was WNL. Mother's pain is controlled. Mother was instructed to  her tamiflu at the [facility] pharmacy. Mother's ride home was with her .

## 2022-11-19 NOTE — DISCHARGE INSTRUCTIONS

## 2022-11-19 NOTE — PLAN OF CARE
Problem: Postpartum (Vaginal Delivery)  Goal: Successful Maternal Role Transition  Outcome: Progressing     Problem: Postpartum (Vaginal Delivery)  Goal: Absence of Infection Signs and Symptoms  Outcome: Progressing     Problem: Postpartum (Vaginal Delivery)  Goal: Optimal Pain Control and Function  Outcome: Progressing   VSS. Ambulating independently in room. Independent with infant cares.

## 2022-11-19 NOTE — PLAN OF CARE
Problem: Postpartum (Vaginal Delivery)  Goal: Successful Maternal Role Transition  Outcome: Progressing  Sydney reports adequate pain control. Vitals stable; bleeding light; fundus firm. Sydney is independent with  cares and feedings. She reports that she is not feeling as weak and tired as she was yesterday. TAMIFLU was given as scheduled (influenza infection).

## 2022-11-19 NOTE — PLAN OF CARE
Problem: Postpartum (Vaginal Delivery)  Goal: Successful Maternal Role Transition  11/18/2022 1953 by Lucretia Tim, RN  Outcome: Progressing  11/18/2022 1953 by Lucretia Tim RN  Outcome: Progressing     Problem: Postpartum (Vaginal Delivery)  Goal: Hemostasis  11/18/2022 1953 by Lucretia Tim RN  Outcome: Progressing  11/18/2022 1953 by Lucretia Tim RN  Outcome: Progressing     Problem: Postpartum (Vaginal Delivery)  Goal: Optimal Pain Control and Function  11/18/2022 1953 by Lucretia Tim, RN  Outcome: Progressing  11/18/2022 1953 by Lucretia Tim RN  Outcome: Progressing     Sydney's VSS. Sydney is formula feeding infant. Sydney would like to plan on formula feed while at hospital stay and will breast feed when at home. Bleeding is WNL. Sydney's pain is controlled with PO meds. Will continue to monitor.

## 2022-12-02 ENCOUNTER — OFFICE VISIT (OUTPATIENT)
Dept: FAMILY MEDICINE | Facility: CLINIC | Age: 32
End: 2022-12-02
Payer: COMMERCIAL

## 2022-12-02 VITALS
OXYGEN SATURATION: 96 % | BODY MASS INDEX: 25.05 KG/M2 | RESPIRATION RATE: 18 BRPM | HEART RATE: 68 BPM | WEIGHT: 128.25 LBS | DIASTOLIC BLOOD PRESSURE: 68 MMHG | SYSTOLIC BLOOD PRESSURE: 103 MMHG

## 2022-12-02 DIAGNOSIS — Z30.09 ENCOUNTER FOR OTHER GENERAL COUNSELING OR ADVICE ON CONTRACEPTION: ICD-10-CM

## 2022-12-02 DIAGNOSIS — Z02.89 ENCOUNTER FOR COMPLETION OF FORM WITH PATIENT: Primary | ICD-10-CM

## 2022-12-02 PROCEDURE — 99212 OFFICE O/P EST SF 10 MIN: CPT | Mod: 24

## 2022-12-02 NOTE — PROGRESS NOTES
Assessment & Plan     Encounter for completion of form with patient  Patient here for FMLA paperwork after uncomplicated vaginal delivery. Doing well since. Not yet had period or had intercourse. Patient to return to work in February.  -paperwork signed and gave to patient    Encounter for other general counseling or advice on contraception  Patient here to discuss IUD vs nexplanon. Has had IUD in the past. Discussed side effects and benefits of both. Provided paperwork to read as well. She will discuss with partner and call to schedule procedure visit.   -patient to call for procedure after decision is made  -f/u in 4 weeks for this and routine 6 week PP visit    Nayana Coulter MD  [facility] Subjective Mai is a 32 year old presenting for the following health issues:  Forms (Fill out Short Term disability form)      HPI   Here for short term disability paperwork discussion after delivery. Had flu during delivery. Feeling much better now. Not yet had period. No sexual intercourse.     Also here for birth control discussion. Has had IUD in past. Interested in Nexplanon now.     Review of Systems   Constitutional, HEENT, cardiovascular, pulmonary, gi and gu systems are negative, except as otherwise noted.      Objective    /68   Pulse 68   Resp 18   Wt 58.2 kg (128 lb 4 oz)   LMP 03/01/2022 (Approximate)   SpO2 96%   BMI 25.05 kg/m    Body mass index is 25.05 kg/m .  Physical Exam   GENERAL: Healthy, alert and no distress  EYES: Eyes grossly normal to inspection.  No discharge or erythema, or obvious scleral/conjunctival abnormalities.  RESP:  Lungs clear throughout. No wheeze or crackles.   CV: Heart RRR. No murmur  Abdomen: Soft, nontender, nondistended.  MSK: No gross deformity. Normal tone.  SKIN: Visible skin clear. No significant rash, abnormal pigmentation or lesions.  NEURO: Cranial nerves grossly intact.  Mentation and speech appropriate for age.  PSYCH: Mentation  appears normal, affect normal/bright, judgement and insight intact, normal speech and appearance well-groomed.

## 2022-12-02 NOTE — PATIENT INSTRUCTIONS
Read and discuss birth control options. Call to schedule appointment in 4 weeks at time of 6 week post partum visit.

## 2022-12-09 NOTE — PROGRESS NOTES
Preceptor Attestation:   Patient seen, evaluated and discussed with the resident. I have verified the content of the note, which accurately reflects my assessment of the patient and the plan of care.  Supervising Physician:Hermiina Spain DO [facility]

## 2023-01-09 ENCOUNTER — MEDICAL CORRESPONDENCE (OUTPATIENT)
Dept: HEALTH INFORMATION MANAGEMENT | Facility: CLINIC | Age: 33
End: 2023-01-09

## 2023-01-18 ENCOUNTER — MEDICAL CORRESPONDENCE (OUTPATIENT)
Dept: HEALTH INFORMATION MANAGEMENT | Facility: CLINIC | Age: 33
End: 2023-01-18

## 2023-01-18 ENCOUNTER — OFFICE VISIT (OUTPATIENT)
Dept: FAMILY MEDICINE | Facility: CLINIC | Age: 33
End: 2023-01-18
Payer: COMMERCIAL

## 2023-01-18 VITALS — WEIGHT: 126 LBS | BODY MASS INDEX: 24.61 KG/M2 | TEMPERATURE: 98.7 F | RESPIRATION RATE: 20 BRPM

## 2023-01-18 DIAGNOSIS — Z30.017 NEXPLANON INSERTION: Primary | ICD-10-CM

## 2023-01-18 LAB — HCG UR QL: NEGATIVE

## 2023-01-18 PROCEDURE — 11981 INSERTION DRUG DLVR IMPLANT: CPT

## 2023-01-18 PROCEDURE — 81025 URINE PREGNANCY TEST: CPT

## 2023-01-18 PROCEDURE — 99213 OFFICE O/P EST LOW 20 MIN: CPT | Mod: 25

## 2023-01-18 NOTE — PROGRESS NOTES
Preceptor Attestation:   Patient seen, evaluated and discussed with the resident. I was present for and supervised the entire procedure. I have verified the content of the note, which accurately reflects my assessment of the patient and the plan of care.  Supervising Physician:Pattie Polanco MD  [facility]

## 2023-01-18 NOTE — PROGRESS NOTES
HPI-Post Partum Visit -       Sydney Dao is a 32 year old  female  without a significant past medical history who presents for a postpartum visit.     A [employer]  was used for this visit.  was used for  this visit.     Patient Active Problem List   Diagnosis     Screening for cervical cancer     Bilateral carpal tunnel syndrome     Numbness and tingling of both feet     Encounter for supervision of normal first pregnancy in first trimester     Influenza A      labor without delivery     Single liveborn infant delivered vaginally     Nexplanon insertion       Current Outpatient Medications   Medication Sig Dispense Refill     acetaminophen (TYLENOL) 325 MG tablet Take 325-650 mg by mouth every 6 hours as needed for mild pain       oseltamivir (TAMIFLU) 75 MG capsule Take 1 capsule (75 mg) by mouth 2 times daily 5 capsule 0     Prenatal Vit-Fe Fumarate-FA (PRENATAL MULTIVITAMIN W/IRON) 27-0.8 MG tablet Take 1 tablet by mouth daily 90 tablet 3                      Delivery date:       Patient had a  of viable boy  with no complications.          Accompanying Signs & Symptoms:                        Breast feeding: breastmilk and formula-- doing both breast milk and formula  Abdominal pain: No                       Bleeding since delivery: NO, outside of recent period that started        Contraception choice: Nexplanon        Patient has not had intercourse since delivery              Last PAP: No results found for: PAP  Pt screened for postpartum depression and complaints are: NEGATIVE         [place] - 0       Allergies   Allergen Reactions     No Known Drug Allergy             Review of Systems:   CONSTITUTIONAL: no fatigue, no unexpected change in weight  SKIN: no worrisome rashes or lesions  EYES: no acute vision problems or changes  ENT: no ear problems, no mouth problems, no throat problems  RESP: no significant cough, no shortness of breath  CV: no chest pain,  no palpitations, no new or worsening peripheral edema  GI: no nausea, no vomiting, no constipation, no diarrhea  : no frequency, no dysuria, no hematuria  NEURO: no weakness, no dizziness, no headaches  ENDOCRINE: no temperature intolerance, no skin/hair changes  PSYCHIATRIC: NEGATIVE for changes in mood or trouble with sleep            Physical Exam:     Vitals:    01/18/23 0948   Resp: 20   Temp: 98.7  F (37.1  C)   TempSrc: Oral   Weight: 57.2 kg (126 lb)       GENERAL: healthy, alert, well nourished, well hydrated, no distress  EYES: normal sclera, EOMI  RESP: breathing well on room air, no respiratory distress  CV: perfusing well  ABDOMEN: soft, no tenderness, nondistended    Admission on 11/17/2022, Discharged on 11/19/2022   Component Date Value Ref Range Status     Influenza A PCR 11/17/2022 Positive (A)  Negative Final     Influenza B PCR 11/17/2022 Negative  Negative Final     RSV PCR 11/17/2022 Negative  Negative Final     SARS CoV2 PCR 11/17/2022 Negative  Negative Final    NEGATIVE: SARS-CoV-2 (COVID-19) RNA not detected, presumed negative.     Group B Strep PCR 11/17/2022 Negative  Negative Final    Presumed negative for Streptococcus agalactiae (Group B Streptococcus) or the number of organisms may be below the limit of detection of the assay.     Sodium 11/17/2022 136  136 - 145 mmol/L Final     Potassium 11/17/2022 3.2 (L)  3.4 - 5.3 mmol/L Final     Chloride 11/17/2022 102  98 - 107 mmol/L Final     Carbon Dioxide (CO2) 11/17/2022 22  22 - 29 mmol/L Final     Anion Gap 11/17/2022 12  7 - 15 mmol/L Final     Urea Nitrogen 11/17/2022 6.4  6.0 - 20.0 mg/dL Final     Creatinine 11/17/2022 0.45 (L)  0.51 - 0.95 mg/dL Final     Calcium 11/17/2022 8.2 (L)  8.6 - 10.0 mg/dL Final     Glucose 11/17/2022 79  70 - 99 mg/dL Final     Alkaline Phosphatase 11/17/2022 123 (H)  35 - 104 U/L Final     AST 11/17/2022 32  10 - 35 U/L Final     ALT 11/17/2022 19  10 - 35 U/L Final     Protein Total 11/17/2022 6.5   6.4 - 8.3 g/dL Final     Albumin 11/17/2022 3.1 (L)  3.5 - 5.2 g/dL Final     Bilirubin Total 11/17/2022 0.4  <=1.2 mg/dL Final     GFR Estimate 11/17/2022 >90  >60 mL/min/1.73m2 Final    Effective December 21, 2021 eGFRcr in adults is calculated using the 2021 CKD-EPI creatinine equation which includes age and gender (Araceli et al., [employer], DOI: 10.[facility]6/[id number])     WBC Count 11/17/2022 4.5  4.0 - 11.0 10e3/uL Final     RBC Count 11/17/2022 4.91  3.80 - 5.20 10e6/uL Final     Hemoglobin 11/17/2022 11.6 (L)  11.7 - 15.7 g/dL Final     Hematocrit 11/17/2022 38.4  35.0 - 47.0 % Final     MCV 11/17/2022 78  78 - 100 fL Final     MCH 11/17/2022 23.6 (L)  26.5 - 33.0 pg Final     MCHC 11/17/2022 30.2 (L)  31.5 - 36.5 g/dL Final     RDW 11/17/2022 15.6 (H)  10.0 - 15.0 % Final     Platelet Count 11/17/2022 292  150 - 450 10e3/uL Final     % Neutrophils 11/17/2022 66  % Final     % Lymphocytes 11/17/2022 26  % Final     % Monocytes 11/17/2022 7  % Final     % Eosinophils 11/17/2022 1  % Final     % Basophils 11/17/2022 0  % Final     % Immature Granulocytes 11/17/2022 0  % Final     NRBCs per 100 WBC 11/17/2022 0  <1 /100 Final     Absolute Neutrophils 11/17/2022 2.9  1.6 - 8.3 10e3/uL Final     Absolute Lymphocytes 11/17/2022 1.2  0.8 - 5.3 10e3/uL Final     Absolute Monocytes 11/17/2022 0.3  0.0 - 1.3 10e3/uL Final     Absolute Eosinophils 11/17/2022 0.0  0.0 - 0.7 10e3/uL Final     Absolute Basophils 11/17/2022 0.0  0.0 - 0.2 10e3/uL Final     Absolute Immature Granulocytes 11/17/2022 0.0  <=0.4 10e3/uL Final     Absolute NRBCs 11/17/2022 0.0  10e3/uL Final     ABO/RH(D) 11/17/2022 O POS   Final     Antibody Screen 11/17/2022 Negative  Negative Final     SPECIMEN EXPIRATION DATE 11/17/2022 [id number]   Final     Treponema Antibody Total 11/17/2022 Nonreactive  Nonreactive Final     Hold Specimen 11/17/2022 [facility]   Final         Assessment and Plan   Sydney was seen today for implant .    Diagnoses and all orders for  this visit:    Nexplanon insertion  Nexplanon inserted today. Tolerated well. See procedure note below.   -     etonogestrel (NEXPLANON) subdermal implant 68 mg  -     HCG qualitative urine; Future  -     HCG qualitative urine    Routine postpartum follow-up  Patient doing well since delivery. Just got over first menstrual period since delivery. No intercourse since delivery -- counseled on being able to have intercourse as desired. Nexplanon inserted today. Breastfeeding and formula feeding. Mood good.   -Pap not indicated  -Contraception: Nexplanon    Options for treatment and follow-up care were reviewed with the patient and/or guardian. Sydney Dao and/or guardian engaged in the decision making process and verbalized understanding of the options discussed and agreed with the final plan.    Nayana Coulter MD    Procedure Note-Nexplanon Insertion    Sydney Dao is a patient of Nayana Hamilton here for placement of etonogestrel implant (Nexplanon).    Indication: Contraception    Consent: Affirmation of informed consent was signed and scanned into the medical record. Risks, benefits and alternatives were discussed. Patient's questions were elicited and answered.     Labs: UPT Negative    LMP: 1/11/23    Previous Contraception: None               Counseling:  Pt. counseled on potential side effects of Nexplanon, including unpredictable spotting/bleeding and plan for removal/replacement of Nexplanon in 5 years or less.      Preoperative Diagnosis: desires effective contraception    Postoperative Diagnosis: etonogestrel implant in place    Skin prep: Betadine    Anesthesia: 1% lidocaine    Technique:   Patient was placed supine with right arm exposed.  Jovani was made 8 cm above medial epicondial.  Arm was prepped with betadine. Insertion point was anesthetized with 2 mL of 1% lidocaine. After stretching the skin with thumb and index finger around the insertion site, skin punctured with the tip of the needle  inserted at 30 degrees and then lowered to horizontal position. While lifting the skin with the tip of the needle, the needle with inserted to its full length. Applicator was stabilized and purple slider was released down. Applicator was then removed. Correct placement of the implant was confirmed by palpation in the patient's arm. Insertion site was dressed with a pressure dressing.      User card and patient chart label filled out. User card given to patient. Nexplanon added to medication list.     Lot# [ [id number] ]    EBL: minimal  Complications: No    Tolerance: Pt tolerated procedure well and was in stable condition.     Follow up: Pt was instructed to call if bleeding, severe pain or foul smell at insertion site.     Isatu Hu MD  [facility] [place] Resident    Precepted with: Dr. Polanco

## 2023-01-18 NOTE — PATIENT INSTRUCTIONS
Wear the band for 24 hours. Then can remove and place band-aid on and shower as usual  Can have in for 5 years

## 2023-07-26 NOTE — TELEPHONE ENCOUNTER
Using a ong  ([id number]) out going call made to follow up and discuss [facility] health referral. [facility]   Private car

## 2023-08-31 ENCOUNTER — OFFICE VISIT (OUTPATIENT)
Dept: FAMILY MEDICINE | Facility: CLINIC | Age: 33
End: 2023-08-31
Payer: COMMERCIAL

## 2023-08-31 VITALS
HEART RATE: 75 BPM | HEIGHT: 60 IN | RESPIRATION RATE: 18 BRPM | SYSTOLIC BLOOD PRESSURE: 95 MMHG | OXYGEN SATURATION: 98 % | DIASTOLIC BLOOD PRESSURE: 54 MMHG | WEIGHT: 123 LBS | TEMPERATURE: 98.4 F | BODY MASS INDEX: 24.15 KG/M2

## 2023-08-31 DIAGNOSIS — R05.3 CHRONIC COUGH: ICD-10-CM

## 2023-08-31 DIAGNOSIS — K59.09 OTHER CONSTIPATION: ICD-10-CM

## 2023-08-31 DIAGNOSIS — Z00.00 ROUTINE GENERAL MEDICAL EXAMINATION AT A HEALTH CARE FACILITY: Primary | ICD-10-CM

## 2023-08-31 PROCEDURE — 99214 OFFICE O/P EST MOD 30 MIN: CPT | Mod: GC

## 2023-08-31 RX ORDER — ALBUTEROL SULFATE 90 UG/1
2 AEROSOL, METERED RESPIRATORY (INHALATION) 2 TIMES DAILY
Qty: 18 G | Refills: 1 | Status: SHIPPED | OUTPATIENT
Start: 2023-08-31 | End: 2023-09-20

## 2023-08-31 RX ORDER — FLUTICASONE PROPIONATE 50 MCG
1 SPRAY, SUSPENSION (ML) NASAL DAILY
Qty: 15.8 ML | Refills: 0 | Status: SHIPPED | OUTPATIENT
Start: 2023-08-31

## 2023-08-31 RX ORDER — OMEPRAZOLE 20 MG/1
20 TABLET, DELAYED RELEASE ORAL DAILY
Qty: 90 TABLET | Refills: 0 | Status: SHIPPED | OUTPATIENT
Start: 2023-08-31 | End: 2023-09-20

## 2023-08-31 RX ORDER — INHALER, ASSIST DEVICES
SPACER (EA) MISCELLANEOUS
Qty: 1 EACH | Refills: 0 | Status: SHIPPED | OUTPATIENT
Start: 2023-08-31

## 2023-08-31 RX ORDER — POLYETHYLENE GLYCOL 3350 17 G/17G
1 POWDER, FOR SOLUTION ORAL DAILY
Qty: 765 G | Refills: 0 | Status: SHIPPED | OUTPATIENT
Start: 2023-08-31

## 2023-08-31 ASSESSMENT — ENCOUNTER SYMPTOMS
DYSURIA: 0
WEAKNESS: 0
CONSTIPATION: 0
HEARTBURN: 0
BREAST MASS: 0
EYE PAIN: 1
FEVER: 0
HEADACHES: 0
ABDOMINAL PAIN: 1
COUGH: 0
CHILLS: 0
PALPITATIONS: 0
SORE THROAT: 0
DIZZINESS: 0
HEMATURIA: 0
JOINT SWELLING: 0
FREQUENCY: 0
PARESTHESIAS: 0
HEMATOCHEZIA: 0
DIARRHEA: 0
MYALGIAS: 0
NERVOUS/ANXIOUS: 1
SHORTNESS OF BREATH: 1
ARTHRALGIAS: 0

## 2023-08-31 NOTE — PROGRESS NOTES
Assessment & Plan     Sydney is a 34 y/o woman with a history of SARS-CoV-2 in July 2022 who presents with cough/SOB and abdominal pain.     Cough/SOB  Possible causes are long COVID, post nasal drip, seasonal allergies, GERD, and asthma. Can't miss diagnoses are pneumonia and malignancy. Given the timing of onset, lack of sputum production, symptoms not aggravated by exercise or during mealtime, and lack of other symptoms such as runny nose, itchy eyes, fever, chronic airway irritation due to long COVID is a likely possibility. Post-nasal drip is another possibility due to patient mentioning a sore throat and enlarged tonsils seen on physical exam. To rule out more severe causes, patient was scheduled for a CXR as she could not complete one today due to time constraints.     At this time, it is difficult to isolate etiology of symptoms so a multi-faceted treatment plan is warranted. This will include Omeprazole, Flonase, and Albuterol (2 puffs, 2x/day with spacer). This was discussed with the patient and they agreed with the plan. Upon hopeful alleviation of symptoms following treatment, we will systematically drop-off treatments to isolate the most effective therapy and narrow our differential. Follow-up in 3 weeks to discuss progress.       Abdominal pain  Possible causes are Constipation, IBS, cholecystis, hepatitis, endometriosis, and post-partum pelvic pain. Given this is a chronic problem, she does not have pain while having a BM, and has normal lab results recorded within the last year, many of the above etiologies are unlikely. Most likely diagnosis is constipation given the location of pain, lack of other pertinent symptoms, time between bowel movements and lack of fiber and water in the diet.     The causes of constipation were discussed with the patient and we encouraged her to drink 64 oz of water/day and add more fiber into her diet in the form of dark green vegetables, nuts, and fruits. Miralax was  also prescribed. We will touch base again in 3 weeks when she returns for follow-up on her cough.       ----- Services Performed by a MEDICAL STUDENT in Presence of RESIDENT/FELLOW Physician-------    Pauline Lei, MS3    I was present with the medical student who participated in the service and in the documentation of this note. I have verified the history and personally performed the physical exam and medical decision making, and have verified the content of the note, which accurately reflects my assessment of the patient and the plan of care.     Nayana Coulter MD  [facility]      Subjective   Sydney is a 33 year old, presenting for the following health issues: Cough with subsequent SOB; Abdominal pain.    Physical and Breathing Problem (Been going on for year)        8/31/2023     1:13 PM   Additional Questions   Roomed by vicky say   Accompanied by self       Healthy Habits:     Getting at least 3 servings of Calcium per day:  Yes    Bi-annual eye exam:  Yes    Dental care twice a year:  NO    Sleep apnea or symptoms of sleep apnea:  None    Diet:  Regular (no restrictions)    Frequency of exercise:  None    Taking medications regularly:  Yes    Medication side effects:  Not applicable    Additional concerns today:  No       Sydney started having a cough followed by SOB after she had COVID last year. The coughing fits occur intermittently and she cannot determine an exacerbating cause. One time she was eating and almost choked because she randomly started coughing. She has some chest tightness following the coughs. She has not tried any medications, but deep breathing seems to help stop the coughing and chest tightness. It is not made worse by exercise or laying down. She denies self or family history of seasonal allergies, acid reflux, asthma or eczema. She denies sputum production, fevers, chills, chest pain, leg swelling.       Sydney also notes she has had right sided abdominal  pain for a long time, and can't remember when it started years ago. It comes intermittently but sometimes last for a few days. It is a 5/10 stabbing pain that does not radiate. She has not tried pain meds, but walking hunched over helps a little. When the pain is bad she cannot sleep on the right side. She has a BM once every 3 days and sometimes when she feels like she has to go to the bathroom, she sits on the toilet and nothing comes out. She does not have regular periods now that she is on Nexplanon, but before that she was very regular and did not noticed her pain worsen during menstruation. She eats a good amount of vegetables in her diet but they're not always dark green and she does not get a lot of fiber. Her water intake is 2, 16 oz bottles per day. Patient denies loss of appetite, nausea, vomiting, diarrhea, changes in her color or frequency of urination, or pain with menstruation.     CONSTITUTIONAL: NEGATIVE for fever, chills, change in weight  ENT/MOUTH: sore throat last week  RESP: see HPI  CV: see HPI; NEGATIVE for dyspnea on exertion, orthopnea, and syncope or near-syncope  GI: see HPI  : normal menstrual cycles, negative for abnormal menstrual cycles, and dysuria      Objective    BP 95/54 (BP Location: Right arm, Patient Position: Sitting, Cuff Size: Adult Regular)   Pulse 75   Temp 98.4  F (36.9  C) (Oral)   Resp 18   Ht 1.524 m (5')   Wt 55.8 kg (123 lb)   SpO2 98%   BMI 24.02 kg/m    Body mass index is 24.02 kg/m .    Physical Exam   GENERAL: healthy, alert and no distress  HENT: ear canals and TM's normal, nose and mouth without ulcers or lesions, large tonsils (but not spanning pharynx) and some erythema   RESP: lungs clear to auscultation - no rales, rhonchi or wheezes  CV: regular rate and rhythm, normal S1 S2, no S3 or S4, no murmur, click or rub, no peripheral edema and peripheral pulses strong  ABDOMEN: soft, nontender, no hepatosplenomegaly, no masses and bowel sounds  normal  PSYCH: mentation appears normal, affect normal/bright

## 2023-08-31 NOTE — PROGRESS NOTES
{PROVIDER CHARTING PREFERENCE:985466}    Subjective   Sydney is a 33 year old, presenting for the following health issues:  Physical and Breathing Problem (Been going on for year)      8/31/2023     1:13 PM   Additional Questions   Roomed by hser say   Accompanied by self       HPI     {SUPERLIST (Optional):869461}  {additonal problems for provider to add (Optional):164657}      Review of Systems   {ROS COMP (Optional):600945}      Objective    BP 95/54 (BP Location: Right arm, Patient Position: Sitting, Cuff Size: Adult Regular)   Pulse 75   Temp 98.4  F (36.9  C) (Oral)   Resp 18   Ht 1.524 m (5')   Wt 55.8 kg (123 lb)   SpO2 98%   BMI 24.02 kg/m    Body mass index is 24.02 kg/m .  Physical Exam   {Exam List (Optional):088246}    {Diagnostic Test Results (Optional):862384}    {AMBULATORY ATTESTATION (Optional):219455}

## 2023-08-31 NOTE — PROGRESS NOTES
SUBJECTIVE:   CC: Sydney is an 33 year old who presents for preventive health visit.       8/31/2023     1:13 PM   Additional Questions   Roomed by hser say   Accompanied by self       HPI  SOB  - 1 year, after COVID last July  - chest tightness  - deep breathing helps  - no fam hx asthma  - no hx reflux  - does not happen at night  - no seasonal allergies, eczema  - Eating and feeling like choking, coughing  - no cold symptoms  - not coughing anything up  - No other symptoms like fever  - no chest pain  - not evoked by exercise  - no leg swelling      Stomach pain  - walks crouched, right middle side  - Stabbing pain  - years  - no loss of appetite  -no nausea   -no vomiting   -BM once every 3 days, sometimes feels like she needs to go and sits on the toilet for up to an hour but can't, soft stool  - no urine change  - can't sleep on side with pain  - does not radiate   - random onset, lasts 1-2 days  (no pain right now)  - 5/10  - debilitating   - not tried meds  - nothing makes it feel better   - Menstruation: 1/month before Nexplanon  - Water intake/day: 2, 16 oz bottle/day  - Fiber: lots of vegetables, not always green    Assessment/ Plan:  - Long COVID  - Reflux  - Asthma  - Post Nasal drip  - Bronchiolitis    - Omeprazole  - Flonase  - Albuterol (2 puffs 2/day with spacer)  - Cardiomyopathy  - Lung Cancer  - CXR    See back in 3 weeks    Abdominal pain:  -Constipation  -Start Miralax, drink more water      {Add if <65 person on Medicare  - Required Questions (Optional):761118}  {Outside tests to abstract? :328081}    {additional problems to add (Optional):912855}  Have you ever done Advance Care Planning? (For example, a Health Directive, POLST, or a discussion with a medical provider or your loved ones about your wishes): { :516248}    Social History     Tobacco Use    Smoking status: Never    Smokeless tobacco: Never   Substance Use Topics    Alcohol use: No     {Rooming staff  Click this link to complete  the Prescreen if response below is not for today's visit  Alcohol Use Prescreen >3 drinks/day or > 7 drinks/week.  If the prescreen question answer is YES, complete the full AUDIT  :120235}         No data to display            {add AUDIT responses (Optional) (A score of 7 for adult men is an indication of hazardous drinking; a score of 8 or more is an indication of an alcohol use disorder.  A score of 7 or more for adult women is an indication of hazardous drinking or an alchohol use disorder):235896}  Reviewed orders with patient.  Reviewed health maintenance and updated orders accordingly - { :244675}  {Chronicprobdata (optional):547451}    Breast Cancer Screening:    FHS-7:        No data to display              {If any of the questions to the BCRA (FHS-7) are answered yes, consider ordering referral for genetic counseling (Optional) :216505}  {AMB Mammogram Decision Support (Optional) :106290}  Pertinent mammograms are reviewed under the imaging tab.    History of abnormal Pap smear: { :829728}      Latest Ref Rng & Units 9/30/2020    10:07 AM   PAP / HPV   HPV 16 DNA NEG Negative    HPV 18 DNA NEG Negative    Other HR HPV NEG Negative      Reviewed and updated as needed this visit by clinical staff   Tobacco  Allergies  Meds              Reviewed and updated as needed this visit by Provider                 {HISTORY OPTIONS (Optional):263652}    Review of Systems  {FEMALE ROS (Optional):725455}     OBJECTIVE:   BP 95/54 (BP Location: Right arm, Patient Position: Sitting, Cuff Size: Adult Regular)   Pulse 75   Temp 98.4  F (36.9  C) (Oral)   Resp 18   Ht 1.524 m (5')   Wt 55.8 kg (123 lb)   SpO2 98%   BMI 24.02 kg/m    Physical Exam  {Exam Choices (Optional):086365}    {Diagnostic Test Results (Optional):720154}    ASSESSMENT/PLAN:   {Diag Picklist:312489}    {Patient advised of split billing (Optional):028189}      COUNSELING:  Reviewed preventive health counseling, as reflected in patient  instructions       Regular exercise       Healthy diet/nutrition       Immunizations  {Vaccinated or declined:249033}           Alcohol Use       Contraception        She reports that she has never smoked. She has never used smokeless tobacco.      {Counseling Resources  [place] Preventive Services Task Force  Cholesterol Screening  Health diet/nutrition  Pooled Cohorts Equation Calculator  USDA's MyPlate  ASA Prophylaxis  Lung CA Screening  Osteoporosis prevention/bone health :079612}  {Breast Cancer Risk Calculator  BRCA-Related Cancer Risk Assessment FHS-7 Tool :008983}  Nayana Coulter MD  [facility]

## 2023-08-31 NOTE — PATIENT INSTRUCTIONS
Preventive Health Recommendations  Female Ages 26 - 39  Yearly exam:   See your health care provider every year in order to  Review health changes.   Discuss preventive care.    Review your medicines if you your doctor has prescribed any.    Until age 30: Get a Pap test every three years (more often if you have had an abnormal result).    After age 30: Talk to your doctor about whether you should have a Pap test every 3 years or have a Pap test with HPV screening every 5 years.   You do not need a Pap test if your uterus was removed (hysterectomy) and you have not had cancer.  You should be tested each year for STDs (sexually transmitted diseases), if you're at risk.   Talk to your provider about how often to have your cholesterol checked.  If you are at risk for diabetes, you should have a diabetes test (fasting glucose).  Shots: Get a flu shot each year. Get a tetanus shot every 10 years.   Nutrition:   Eat at least 5 servings of fruits and vegetables each day.  Eat whole-grain bread, whole-wheat pasta and brown rice instead of white grains and rice.  Get adequate Calcium and Vitamin D.     Lifestyle  Exercise at least 150 minutes a week (30 minutes a day, 5 days of the week). This will help you control your weight and prevent disease.  Limit alcohol to one drink per day.  No smoking.   Wear sunscreen to prevent skin cancer.  See your dentist every six months for an exam and cleaning.    
Pt states he only have one pill left. Advised him that he'd need to have the labs done before he can have any additional refills. States it will probably be a week or so before he can do the labs.  
No

## 2023-09-08 ENCOUNTER — ANCILLARY PROCEDURE (OUTPATIENT)
Dept: GENERAL RADIOLOGY | Facility: CLINIC | Age: 33
End: 2023-09-08
Attending: FAMILY MEDICINE
Payer: COMMERCIAL

## 2023-09-08 DIAGNOSIS — R05.3 CHRONIC COUGH: ICD-10-CM

## 2023-09-08 PROCEDURE — 71046 X-RAY EXAM CHEST 2 VIEWS: CPT | Mod: TC | Performed by: RADIOLOGY

## 2023-09-20 ENCOUNTER — OFFICE VISIT (OUTPATIENT)
Dept: FAMILY MEDICINE | Facility: CLINIC | Age: 33
End: 2023-09-20
Payer: COMMERCIAL

## 2023-09-20 VITALS
DIASTOLIC BLOOD PRESSURE: 70 MMHG | WEIGHT: 124 LBS | RESPIRATION RATE: 20 BRPM | BODY MASS INDEX: 24.35 KG/M2 | HEIGHT: 60 IN | SYSTOLIC BLOOD PRESSURE: 111 MMHG

## 2023-09-20 DIAGNOSIS — Z23 NEED FOR PROPHYLACTIC VACCINATION AND INOCULATION AGAINST INFLUENZA: ICD-10-CM

## 2023-09-20 DIAGNOSIS — R05.3 CHRONIC COUGH: Primary | ICD-10-CM

## 2023-09-20 PROCEDURE — 90471 IMMUNIZATION ADMIN: CPT

## 2023-09-20 PROCEDURE — 99214 OFFICE O/P EST MOD 30 MIN: CPT | Mod: 25

## 2023-09-20 PROCEDURE — 90686 IIV4 VACC NO PRSV 0.5 ML IM: CPT

## 2023-09-20 RX ORDER — NICOTINE POLACRILEX 4 MG/1
GUM, CHEWING ORAL
COMMUNITY
Start: 2023-08-31 | End: 2023-09-20

## 2023-09-20 NOTE — PROGRESS NOTES
Assessment & Plan     Chronic cough  Here for follow-up of subacute/chronic cough. Previously started on Flonase, Prilosec and albuterol at last visit. Patient discontinued Prilosec on her own as she wasn't noting any benefit. Also has not noticed much improvement with albuterol. She does though feel like her cough is better so will continue Flonase. Does not seem to be consistent with allergies as she has no prior history of this but she does endorse intermittent clear rhinorrhea. This has all happened since getting Covid last fall (2022) -- considering long Covid as diagnosis although unclear. For now we discussed continuing Flonase. If she were to have cough worsen again discussed possible PFTs for further lung evaluation with possible re-initiation of albuterol.  - continue Flonase  - discontinue Prilosec and albuterol  - possible PFTs in future if cough returns or worsens again -- patient understanding  - follow up PRN     Need for prophylactic vaccination and inoculation against influenza  - given flu vaccine  - [place] SIGN LANGUAGE INTERP, FACE TO FACE, PER 15 MNS      Nayana Coulter MD  [facility] [place]      Gamaliel Grimes is a 33 year old, presenting for the following health issues:  Follow up  (Follow up) and Imm/Inj (Flu Shot)      9/20/2023     2:20 PM   Additional Questions   Roomed by Ami   Accompanied by self       HPI   Follow-up cough:  -treated with Flonase, Prilosec and albuterol as unclear what the concern was, also long covid was a concern as the cough developed after having Covid last winter  -associated rhinorrhea  -not liking Prilosec as it was causing some stomach so stopped this medicine. Didn't notice worsening of cough with stopping this medication  -thinks the cough is getting better  -didn't notice huge improvement with inhalers   -no family history of asthma      Review of Systems   Constitutional, HEENT, cardiovascular, pulmonary, gi and gu systems are  "negative, except as otherwise noted.      Objective    /70   Resp 20   Ht 1.52 m (4' 11.84\")   Wt 56.2 kg (124 lb)   BMI 24.34 kg/m    Body mass index is 24.34 kg/m .  Physical Exam   GENERAL: Healthy, alert and no distress  EYES: Eyes grossly normal to inspection.  No discharge or erythema, or obvious scleral/conjunctival abnormalities.  RESP:  Lungs clear throughout. No wheeze or crackles.   CV: Heart RRR. No murmur  Abdomen: Soft, nontender, nondistended.  MSK: No gross deformity. Normal tone.  SKIN: Visible skin clear. No significant rash, abnormal pigmentation or lesions.  NEURO: Cranial nerves grossly intact.  Mentation and speech appropriate for age.  PSYCH: Mentation appears normal, affect normal/bright, judgement and insight intact, normal speech and appearance well-groomed.          "

## 2023-09-20 NOTE — CONFIDENTIAL NOTE
Interpersonal Safety (Abuse) Screening Follow Up    Interpersonal Safety Screen  Do you feel physically and emotionally safe where you currently live?: Yes  Within the past 12 months, have you been hit, slapped, kicked or otherwise physically hurt by someone?: NO  Within the past 12 months, have you been humiliated or emotionally abused in other ways by your partner or ex-partner?: No    Summary of concern: Patient misunderstood the initial questions by rooming staff. Patient feels safe at home and has NOT been hit or kicked at home. This answer was corrected by rooming staff.    Follow Up  No further follow-up needed as this was a mistake entry

## 2023-09-22 PROBLEM — R05.3 CHRONIC COUGH: Status: ACTIVE | Noted: 2023-09-22

## 2024-07-09 NOTE — PROGRESS NOTES
Assessment & Plan     1. Lightheadedness  2. Muscle tension pain  Patient's pain is likely musculoskeletal in nature because it  was preceded by a fall and it is localized to the areas that likely took impact according to her description of the fall. The headache is also likely associated with the traumatic fall and head impact. Symptoms not consistent with concussion.     The chronic weakness and lightheadedness that preceded the fall is likely from low blood sugar/dehydration related to inadequate oral intake as patient only eats twice a day. No red flags for cardiac etiologies, seizures. Today's normal orthostatic blood pressures are not in support of orthostatic hypotension as the cause of the lightheadedness while standing.  - CBC with platelets  - Basic metabolic panel  - cyclobenzaprine (FLEXERIL) 5 MG tablet; Take 1 tablet (5 mg) by mouth 3 times daily as needed for muscle spasms  Dispense: 30 tablet; Refill: 0      No follow-ups on file.    Gamaliel Grimes is a 34 year old, presenting for the following health issues:  Dizziness (Fell backward and hit her head on the window. Has been dizzy since then ) and Patient Request for Note/Letter (Planning not to go to work today, due to dizzyness )    HPI   Posterior head, upper back, and L shoulder pain.  Patient fell 2 weeks ago while talking to her talking to her children and  Fell backwards and hit her posterior head head on the window ledge. She didn't pass out during or after the fall and remebers all the events surrounding the fall. In weeks leading up to the fall, patient had been feeling exhausted, shaky with weak legs and lightheaded when standing. Patient denies chest pain or shortness of breath during the fall. She now complains of pain at the posterior head, upper back and left and left shoulder. She also has a dull headache that is associated with on and off blurry vision and nausea but no vomiting since the fall. This is the first time she is had  "this kind of a fall. Takes tylenol and advil which helps but not completely. Sleeps well and mentions that sleeping helps with the pain. No attention or concentration issues. She is now most worried about future fall recurrence and the pain in her posterior head pain, left sided upper back and left shoulder. No history of  diarrheal, seizures, or heart disease.     Puffy right upper eyelid  Swollen r eye eye lid  2-3 days. Denies discharge and change in vision in this eye.       Current Symptoms:   Headache or  pressure  in head 1 - Mild   Upset stomach or throwing up  0 - No symptoms   Problems with balance  0 - No symptoms   Feeling dizzy  0 - No symptoms   Sensitivity to light 0 - No symptoms   Sensitivity to noise  0 - No symptoms   Mood changes  0 - No symptoms   Feeling sluggish, hazy or foggy  0 - No symptoms   Trouble concentrating, lack of focus 0 - No symptoms   Motion sickness  0 - No symptoms   Vision changes  1 - Mild   Memory problems  0 - No symptoms   Feeling confused  0 - No symptoms   Neck pain 3 - Moderate   Trouble sleeping 0 - No symptoms   Symptom Score at this visit:  5            Objective    /80   Pulse 67   Temp 98.9  F (37.2  C) (Oral)   Resp 18   Ht 1.5 m (4' 11.06\")   Wt 56.7 kg (125 lb)   SpO2 99%   BMI 25.20 kg/m    Body mass index is 25.2 kg/m .  Physical Exam   GENERAL: alert and no distress  EYES: Right eye upper eyelid slightly edematous and tender. No discharge.  PERRL and conjunctivae and sclerae normal in both eyes.  HENT: No bruises or skin openning seen at posterior scalp but mildly tender. The rest of the head was normal  NECK: No asymmetry, masses, or scars. Full active range of motion of neck.  RESP: lungs clear to auscultation - no rales, rhonchi or wheezes  CV: regular rate and rhythm, normal S1 S2, no S3 or S4, no murmur, click or rub, no peripheral edema  MS: Mild tenderness with left shoulder active range of motions. Upper back was   SKIN: no bruises or " rashes observed  NEURO: Normal strength and tone, mentation intact and speech normal  PSYCH: mentation appears normal, affect normal/bright            Signed Electronically by: Obed Santos MD

## 2024-07-11 ENCOUNTER — OFFICE VISIT (OUTPATIENT)
Dept: FAMILY MEDICINE | Facility: CLINIC | Age: 34
End: 2024-07-11
Payer: COMMERCIAL

## 2024-07-11 VITALS
SYSTOLIC BLOOD PRESSURE: 120 MMHG | HEIGHT: 59 IN | OXYGEN SATURATION: 99 % | BODY MASS INDEX: 25.2 KG/M2 | DIASTOLIC BLOOD PRESSURE: 80 MMHG | RESPIRATION RATE: 18 BRPM | HEART RATE: 67 BPM | WEIGHT: 125 LBS | TEMPERATURE: 98.9 F

## 2024-07-11 DIAGNOSIS — M79.10 MUSCLE TENSION PAIN: ICD-10-CM

## 2024-07-11 DIAGNOSIS — R42 LIGHTHEADEDNESS: Primary | ICD-10-CM

## 2024-07-11 LAB
ANION GAP SERPL CALCULATED.3IONS-SCNC: 9 MMOL/L (ref 7–15)
BUN SERPL-MCNC: 11.4 MG/DL (ref 6–20)
CALCIUM SERPL-MCNC: 9.3 MG/DL (ref 8.6–10)
CHLORIDE SERPL-SCNC: 103 MMOL/L (ref 98–107)
CREAT SERPL-MCNC: 0.48 MG/DL (ref 0.51–0.95)
DEPRECATED HCO3 PLAS-SCNC: 27 MMOL/L (ref 22–29)
EGFRCR SERPLBLD CKD-EPI 2021: >90 ML/MIN/1.73M2
ERYTHROCYTE [DISTWIDTH] IN BLOOD BY AUTOMATED COUNT: 12.4 % (ref 10–15)
GLUCOSE SERPL-MCNC: 91 MG/DL (ref 70–99)
HCT VFR BLD AUTO: 45 % (ref 35–47)
HGB BLD-MCNC: 14.4 G/DL (ref 11.7–15.7)
MCH RBC QN AUTO: 29.4 PG (ref 26.5–33)
MCHC RBC AUTO-ENTMCNC: 32 G/DL (ref 31.5–36.5)
MCV RBC AUTO: 92 FL (ref 78–100)
PLATELET # BLD AUTO: 331 10E3/UL (ref 150–450)
POTASSIUM SERPL-SCNC: 4.2 MMOL/L (ref 3.4–5.3)
RBC # BLD AUTO: 4.89 10E6/UL (ref 3.8–5.2)
SODIUM SERPL-SCNC: 139 MMOL/L (ref 135–145)
WBC # BLD AUTO: 8.9 10E3/UL (ref 4–11)

## 2024-07-11 PROCEDURE — 36415 COLL VENOUS BLD VENIPUNCTURE: CPT | Performed by: MASSAGE THERAPIST

## 2024-07-11 PROCEDURE — 85027 COMPLETE CBC AUTOMATED: CPT | Performed by: MASSAGE THERAPIST

## 2024-07-11 PROCEDURE — 99214 OFFICE O/P EST MOD 30 MIN: CPT | Mod: GC | Performed by: MASSAGE THERAPIST

## 2024-07-11 PROCEDURE — 80048 BASIC METABOLIC PNL TOTAL CA: CPT | Performed by: MASSAGE THERAPIST

## 2024-07-11 RX ORDER — CYCLOBENZAPRINE HCL 5 MG
5 TABLET ORAL 3 TIMES DAILY PRN
Qty: 30 TABLET | Refills: 0 | Status: SHIPPED | OUTPATIENT
Start: 2024-07-11

## 2024-07-11 NOTE — PATIENT INSTRUCTIONS
Eat more frequent small meals throughout the day  If you are still having weakness/dizziness come back to discuss further

## 2024-07-11 NOTE — LETTER
RETURN TO WORK/SCHOOL FORM    7/11/2024    Re: Sydney Dao  1990      To Whom It May Concern:     Sydney Dao was seen in clinic today, 07/11/2024. She may return to work without restrictions on 7/12/24          Restrictions:  None      Obed Santos MD  7/11/2024 11:36 AM

## 2024-07-11 NOTE — PROGRESS NOTES
Preceptor Attestation:  Patient's case reviewed and discussed with Obed Santos MD resident and I evaluated the patient. I agree with written assessment and plan of care.  Supervising Physician:  SCARLET ROBLES MD  [facility]

## 2024-11-10 NOTE — PROGRESS NOTES
Preceptor Attestation:   Patient seen, evaluated and discussed with the resident, Dr. Tamera Peter. I have verified the content of the note, which accurately reflects my assessment of the patient and the plan of care.  Supervising Physician:Su Swanson MD  [facility]             None